# Patient Record
Sex: MALE | Race: WHITE | Employment: OTHER | URBAN - METROPOLITAN AREA
[De-identification: names, ages, dates, MRNs, and addresses within clinical notes are randomized per-mention and may not be internally consistent; named-entity substitution may affect disease eponyms.]

---

## 2019-07-10 PROBLEM — I25.10 CORONARY ARTERY DISEASE INVOLVING NATIVE CORONARY ARTERY OF NATIVE HEART WITHOUT ANGINA PECTORIS: Status: ACTIVE | Noted: 2019-07-10

## 2019-07-10 PROBLEM — E78.2 MIXED HYPERLIPIDEMIA: Status: ACTIVE | Noted: 2019-07-10

## 2019-07-10 PROBLEM — I10 ESSENTIAL HYPERTENSION: Status: ACTIVE | Noted: 2019-07-10

## 2019-07-10 NOTE — PROGRESS NOTES
OTHER   Kidney stone surgery    PERCUTANEOUS CORONARY INTERVENTION N/A 2016   PERCUTANEOUS CORONARY INTERVENTION performed by Maynor Neves MD at [facility] CATH LAB              Surgical History:   has no past surgical history on file. Social History:   reports that he has never smoked. He has never used smokeless tobacco.       SOCIAL HISTORY:     TOBACCO USE:  History   Smoking Status    Former Smoker    Packs/day: 0.50    Years: 20.00    Types: Cigarettes   Smokeless Tobacco    Never Used   Comment: Patient states he quit 30 years ago       FAMILY HISTORY:     Family History   Problem Relation Age of Onset    Cancer Mother      [facility] COPD Sister              Family History:  family history is not on file. Home Medications:  Were reviewed and are listed in nursing record and/or below  Prior to Admission medications    Medication Sig Start Date End Date Taking? Authorizing Provider   atorvastatin (LIPITOR) 80 MG tablet Take 80 mg by mouth daily   Yes Historical Provider, MD   metoprolol succinate (TOPROL XL) 25 MG extended release tablet Take 25 mg by mouth 2 times daily (with meals)   Yes Historical Provider, MD   clopidogrel (PLAVIX) 75 MG tablet Take 75 mg by mouth daily   Yes Historical Provider, MD   triamterene-hydrochlorothiazide (DYAZIDE) 37.5-25 MG per capsule Take 1 capsule by mouth every morning   Yes Historical Provider, MD   primidone (MYSOLINE) 50 MG tablet Take 50 mg by mouth 3 times daily   Yes Historical Provider, MD   levothyroxine (SYNTHROID) 50 MCG tablet Take 50 mcg by mouth Daily   Yes Historical Provider, MD   allopurinol (ZYLOPRIM) 300 MG tablet Take 300 mg by mouth daily   Yes Historical Provider, MD   aspirin 81 MG tablet Take 81 mg by mouth daily   Yes Historical Provider, MD          Allergies:  Patient has no known allergies. Review of Systems:   A 14 point review of symptoms completed.  Pertinent positives identified in the HPI, all other review of 50%.  3.  There is mixed fibrofatty plaque in the mid and distal portions of the right and left common carotid artery extending into the carotid bulbs. 4.  There is normal antegrade flow demonstrated in the right and left vertebral artery. Studies:       I have reviewed labs and imaging/xray/diagnostic testing in this note. Assessment        Patient Active Problem List   Diagnosis    Coronary artery disease involving native coronary artery of native heart without angina pectoris    Essential hypertension    Mixed hyperlipidemia         Class 3-4 angina        Plan   1. Cardiac cath for angina, go to ER for any recurrent cp in interim  2. Nitro under the tongue for chest pain  3. Discussed GI referral for possible GERD  4. Will obtain records from [facility]   5. Follow up after procedure    Get labs today, may need f/u echo pending review of records from [address]: The procedures, indications, risks and alternatives have been discussed with the patient and, as appropriate, with the patient's guardian . Risks discussed included, but are not limited to, bleeding, development of blood clots/emboli, damage to blood vessels, renal failure, malignant cardiac arrhythmias, stroke, heart attack, emergent coronary bypass surgery, death, dye allergy. The patient (and guardian as appropriate) expressed understanding of the aforementioned and wished to proceed. Scribe's attestation: This note was scribed in the presence of Dr. Minoo Grissom by Familia Adams RN      Thank you for allowing us to participate in the care of [address]. Please call me with any questions [phone]. Minoo Grissom MD, [facility]   Interventional Cardiologist  [facility]  [phone] [facility]  [phone] [address] [place] [address]  7/11/2019 9:40 AM    I will address the patient's cardiac risk factors and adjusted pharmacologic treatment as needed.  In addition, I

## 2019-07-11 ENCOUNTER — HOSPITAL ENCOUNTER (OUTPATIENT)
Age: 84
Discharge: HOME | End: 2019-07-11
Payer: MEDICARE

## 2019-07-11 ENCOUNTER — OFFICE VISIT (OUTPATIENT)
Dept: CARDIOLOGY CLINIC | Age: 84
End: 2019-07-11
Payer: MEDICARE

## 2019-07-11 ENCOUNTER — TELEPHONE (OUTPATIENT)
Dept: CARDIOLOGY CLINIC | Age: 84
End: 2019-07-11

## 2019-07-11 VITALS
BODY MASS INDEX: 31.31 KG/M2 | DIASTOLIC BLOOD PRESSURE: 62 MMHG | WEIGHT: 206.6 LBS | HEIGHT: 68 IN | OXYGEN SATURATION: 97 % | HEART RATE: 71 BPM | SYSTOLIC BLOOD PRESSURE: 122 MMHG

## 2019-07-11 DIAGNOSIS — I10 ESSENTIAL HYPERTENSION: ICD-10-CM

## 2019-07-11 DIAGNOSIS — E78.2 MIXED HYPERLIPIDEMIA: ICD-10-CM

## 2019-07-11 DIAGNOSIS — I20.0 UNSTABLE ANGINA (HCC): Primary | ICD-10-CM

## 2019-07-11 DIAGNOSIS — I25.10 CORONARY ARTERY DISEASE INVOLVING NATIVE CORONARY ARTERY OF NATIVE HEART WITHOUT ANGINA PECTORIS: ICD-10-CM

## 2019-07-11 LAB
ANION GAP SERPL CALCULATED.3IONS-SCNC: 13 MMOL/L (ref 3–16)
BASOPHILS ABSOLUTE: 0.1 K/UL (ref 0–0.2)
BASOPHILS RELATIVE PERCENT: 0.6 %
BUN BLDV-MCNC: 19 MG/DL (ref 7–20)
CALCIUM SERPL-MCNC: 9.3 MG/DL (ref 8.3–10.6)
CHLORIDE BLD-SCNC: 102 MMOL/L (ref 99–110)
CO2: 25 MMOL/L (ref 21–32)
CREAT SERPL-MCNC: 0.9 MG/DL (ref 0.8–1.3)
EOSINOPHILS ABSOLUTE: 1.7 K/UL (ref 0–0.6)
EOSINOPHILS RELATIVE PERCENT: 19.7 %
GFR AFRICAN AMERICAN: >60
GFR NON-AFRICAN AMERICAN: >60
GLUCOSE BLD-MCNC: 161 MG/DL (ref 70–99)
HCT VFR BLD CALC: 43.9 % (ref 40.5–52.5)
HEMOGLOBIN: 15.2 G/DL (ref 13.5–17.5)
LYMPHOCYTES ABSOLUTE: 1.2 K/UL (ref 1–5.1)
LYMPHOCYTES RELATIVE PERCENT: 13.7 %
MCH RBC QN AUTO: 32.3 PG (ref 26–34)
MCHC RBC AUTO-ENTMCNC: 34.7 G/DL (ref 31–36)
MCV RBC AUTO: 93.2 FL (ref 80–100)
MONOCYTES ABSOLUTE: 0.5 K/UL (ref 0–1.3)
MONOCYTES RELATIVE PERCENT: 5.3 %
NEUTROPHILS ABSOLUTE: 5.3 K/UL (ref 1.7–7.7)
NEUTROPHILS RELATIVE PERCENT: 60.7 %
PDW BLD-RTO: 14.3 % (ref 12.4–15.4)
PLATELET # BLD: 123 K/UL (ref 135–450)
PMV BLD AUTO: 10.1 FL (ref 5–10.5)
POTASSIUM SERPL-SCNC: 3.5 MMOL/L (ref 3.5–5.1)
RBC # BLD: 4.71 M/UL (ref 4.2–5.9)
SODIUM BLD-SCNC: 140 MMOL/L (ref 136–145)
WBC # BLD: 8.6 K/UL (ref 4–11)

## 2019-07-11 PROCEDURE — 80048 BASIC METABOLIC PNL TOTAL CA: CPT

## 2019-07-11 PROCEDURE — 1036F TOBACCO NON-USER: CPT | Performed by: INTERNAL MEDICINE

## 2019-07-11 PROCEDURE — 93000 ELECTROCARDIOGRAM COMPLETE: CPT | Performed by: INTERNAL MEDICINE

## 2019-07-11 PROCEDURE — 1123F ACP DISCUSS/DSCN MKR DOCD: CPT | Performed by: INTERNAL MEDICINE

## 2019-07-11 PROCEDURE — G8598 ASA/ANTIPLAT THER USED: HCPCS | Performed by: INTERNAL MEDICINE

## 2019-07-11 PROCEDURE — G8427 DOCREV CUR MEDS BY ELIG CLIN: HCPCS | Performed by: INTERNAL MEDICINE

## 2019-07-11 PROCEDURE — 4040F PNEUMOC VAC/ADMIN/RCVD: CPT | Performed by: INTERNAL MEDICINE

## 2019-07-11 PROCEDURE — 99215 OFFICE O/P EST HI 40 MIN: CPT | Performed by: INTERNAL MEDICINE

## 2019-07-11 PROCEDURE — G8417 CALC BMI ABV UP PARAM F/U: HCPCS | Performed by: INTERNAL MEDICINE

## 2019-07-11 PROCEDURE — 36415 COLL VENOUS BLD VENIPUNCTURE: CPT

## 2019-07-11 PROCEDURE — 85025 COMPLETE CBC W/AUTO DIFF WBC: CPT

## 2019-07-11 RX ORDER — ALLOPURINOL 300 MG/1
300 TABLET ORAL DAILY
COMMUNITY

## 2019-07-11 RX ORDER — ATORVASTATIN CALCIUM 80 MG/1
80 TABLET, FILM COATED ORAL DAILY
COMMUNITY
End: 2019-10-09 | Stop reason: SDUPTHER

## 2019-07-11 RX ORDER — LEVOTHYROXINE SODIUM 0.05 MG/1
50 TABLET ORAL DAILY
COMMUNITY

## 2019-07-11 RX ORDER — PRIMIDONE 50 MG/1
50 TABLET ORAL 3 TIMES DAILY
Status: ON HOLD | COMMUNITY
End: 2020-07-08

## 2019-07-11 RX ORDER — TRIAMTERENE AND HYDROCHLOROTHIAZIDE 37.5; 25 MG/1; MG/1
1 CAPSULE ORAL EVERY MORNING
Status: ON HOLD | COMMUNITY
End: 2020-07-19 | Stop reason: HOSPADM

## 2019-07-11 RX ORDER — METOPROLOL SUCCINATE 25 MG/1
25 TABLET, EXTENDED RELEASE ORAL 2 TIMES DAILY WITH MEALS
Status: ON HOLD | COMMUNITY
End: 2020-07-19 | Stop reason: HOSPADM

## 2019-07-11 RX ORDER — NITROGLYCERIN 0.4 MG/1
0.4 TABLET SUBLINGUAL EVERY 5 MIN PRN
Qty: 25 TABLET | Refills: 3 | Status: ON HOLD | OUTPATIENT
Start: 2019-07-11 | End: 2020-07-19 | Stop reason: HOSPADM

## 2019-07-11 RX ORDER — NITROGLYCERIN 0.4 MG/1
0.4 TABLET SUBLINGUAL EVERY 5 MIN PRN
Qty: 25 TABLET | Refills: 3 | Status: CANCELLED | OUTPATIENT
Start: 2019-07-11

## 2019-07-11 RX ORDER — CLOPIDOGREL BISULFATE 75 MG/1
75 TABLET ORAL DAILY
Status: ON HOLD | COMMUNITY
End: 2020-07-19 | Stop reason: HOSPADM

## 2019-07-11 NOTE — TELEPHONE ENCOUNTER
Patient seen in office today. Patient is scheduled with Dr. Will Rodriguez for Left Heart Cath on 7/16/19 at 12pm [facility], arrival time of 10:30am to the Cath Lab. Please have patient arrive to the main entrance of [facility] at 10:15am and check in with the registration desk. Medications reviewed in office with FELIX Ellis. Remind patient to be NPO after midnight (8 hours prior). Do not apply lotions/creams on skin the day of procedure.

## 2019-07-12 ENCOUNTER — TELEPHONE (OUTPATIENT)
Dept: CARDIOLOGY CLINIC | Age: 84
End: 2019-07-12

## 2019-07-16 ENCOUNTER — HOSPITAL ENCOUNTER (INPATIENT)
Dept: CARDIAC CATH/INVASIVE PROCEDURES | Age: 84
LOS: 1 days | Discharge: HOME | DRG: 287 | End: 2019-07-17
Attending: INTERNAL MEDICINE | Admitting: INTERNAL MEDICINE
Payer: MEDICARE

## 2019-07-16 DIAGNOSIS — I20.0 UNSTABLE ANGINA (HCC): ICD-10-CM

## 2019-07-16 DIAGNOSIS — I25.10 CORONARY ARTERY DISEASE INVOLVING NATIVE CORONARY ARTERY OF NATIVE HEART WITHOUT ANGINA PECTORIS: ICD-10-CM

## 2019-07-16 LAB
A/G RATIO: 1.2 (ref 1.1–2.2)
ALBUMIN SERPL-MCNC: 4.2 G/DL (ref 3.4–5)
ALP BLD-CCNC: 73 U/L (ref 40–129)
ALT SERPL-CCNC: 92 U/L (ref 10–40)
ANION GAP SERPL CALCULATED.3IONS-SCNC: 11 MMOL/L (ref 3–16)
AST SERPL-CCNC: 75 U/L (ref 15–37)
BILIRUB SERPL-MCNC: 0.9 MG/DL (ref 0–1)
BUN BLDV-MCNC: 17 MG/DL (ref 7–20)
CALCIUM SERPL-MCNC: 9.4 MG/DL (ref 8.3–10.6)
CHLORIDE BLD-SCNC: 101 MMOL/L (ref 99–110)
CHOLESTEROL, TOTAL: 90 MG/DL (ref 0–199)
CO2: 24 MMOL/L (ref 21–32)
CREAT SERPL-MCNC: 0.8 MG/DL (ref 0.8–1.3)
EKG ATRIAL RATE: 63 BPM
EKG DIAGNOSIS: NORMAL
EKG P AXIS: 57 DEGREES
EKG P-R INTERVAL: 214 MS
EKG Q-T INTERVAL: 416 MS
EKG QRS DURATION: 88 MS
EKG QTC CALCULATION (BAZETT): 425 MS
EKG R AXIS: -1 DEGREES
EKG T AXIS: 40 DEGREES
EKG VENTRICULAR RATE: 63 BPM
GFR AFRICAN AMERICAN: >60
GFR NON-AFRICAN AMERICAN: >60
GLOBULIN: 3.6 G/DL
GLUCOSE BLD-MCNC: 125 MG/DL (ref 70–99)
GLUCOSE BLD-MCNC: 165 MG/DL (ref 70–99)
HCT VFR BLD CALC: 44.4 % (ref 40.5–52.5)
HDLC SERPL-MCNC: 35 MG/DL (ref 40–60)
HEMOGLOBIN: 15.3 G/DL (ref 13.5–17.5)
INR BLD: 1.13 (ref 0.86–1.14)
LDL CHOLESTEROL CALCULATED: 30 MG/DL
LEFT VENTRICULAR EJECTION FRACTION HIGH VALUE: 55 %
LEFT VENTRICULAR EJECTION FRACTION MODE: NORMAL
LV EF: 50 %
LV EF: 50 %
LVEF MODALITY: NORMAL
MCH RBC QN AUTO: 31.5 PG (ref 26–34)
MCHC RBC AUTO-ENTMCNC: 34.5 G/DL (ref 31–36)
MCV RBC AUTO: 91.3 FL (ref 80–100)
PDW BLD-RTO: 14.6 % (ref 12.4–15.4)
PERFORMED ON: ABNORMAL
PLATELET # BLD: 127 K/UL (ref 135–450)
PMV BLD AUTO: 9.3 FL (ref 5–10.5)
POC ACT LR: 268 SEC
POTASSIUM SERPL-SCNC: 3.5 MMOL/L (ref 3.5–5.1)
PROTHROMBIN TIME: 12.9 SEC (ref 9.8–13)
RBC # BLD: 4.87 M/UL (ref 4.2–5.9)
SODIUM BLD-SCNC: 136 MMOL/L (ref 136–145)
TOTAL PROTEIN: 7.8 G/DL (ref 6.4–8.2)
TRIGL SERPL-MCNC: 123 MG/DL (ref 0–150)
VLDLC SERPL CALC-MCNC: 25 MG/DL
WBC # BLD: 9.7 K/UL (ref 4–11)

## 2019-07-16 PROCEDURE — 85027 COMPLETE CBC AUTOMATED: CPT

## 2019-07-16 PROCEDURE — 85347 COAGULATION TIME ACTIVATED: CPT

## 2019-07-16 PROCEDURE — C1769 GUIDE WIRE: HCPCS

## 2019-07-16 PROCEDURE — 6370000000 HC RX 637 (ALT 250 FOR IP): Performed by: INTERNAL MEDICINE

## 2019-07-16 PROCEDURE — 2580000003 HC RX 258

## 2019-07-16 PROCEDURE — 80061 LIPID PANEL: CPT

## 2019-07-16 PROCEDURE — 6360000004 HC RX CONTRAST MEDICATION

## 2019-07-16 PROCEDURE — C1887 CATHETER, GUIDING: HCPCS

## 2019-07-16 PROCEDURE — 6370000000 HC RX 637 (ALT 250 FOR IP)

## 2019-07-16 PROCEDURE — 80053 COMPREHEN METABOLIC PANEL: CPT

## 2019-07-16 PROCEDURE — 4A023N7 MEASUREMENT OF CARDIAC SAMPLING AND PRESSURE, LEFT HEART, PERCUTANEOUS APPROACH: ICD-10-PCS | Performed by: INTERNAL MEDICINE

## 2019-07-16 PROCEDURE — 93459 L HRT ART/GRFT ANGIO: CPT

## 2019-07-16 PROCEDURE — 99153 MOD SED SAME PHYS/QHP EA: CPT

## 2019-07-16 PROCEDURE — B2151ZZ FLUOROSCOPY OF LEFT HEART USING LOW OSMOLAR CONTRAST: ICD-10-PCS | Performed by: INTERNAL MEDICINE

## 2019-07-16 PROCEDURE — 6360000002 HC RX W HCPCS

## 2019-07-16 PROCEDURE — 2709999900 HC NON-CHARGEABLE SUPPLY

## 2019-07-16 PROCEDURE — 93005 ELECTROCARDIOGRAM TRACING: CPT | Performed by: INTERNAL MEDICINE

## 2019-07-16 PROCEDURE — 2580000003 HC RX 258: Performed by: INTERNAL MEDICINE

## 2019-07-16 PROCEDURE — B2131ZZ FLUOROSCOPY OF MULTIPLE CORONARY ARTERY BYPASS GRAFTS USING LOW OSMOLAR CONTRAST: ICD-10-PCS | Performed by: INTERNAL MEDICINE

## 2019-07-16 PROCEDURE — 85610 PROTHROMBIN TIME: CPT

## 2019-07-16 PROCEDURE — 99152 MOD SED SAME PHYS/QHP 5/>YRS: CPT | Performed by: INTERNAL MEDICINE

## 2019-07-16 PROCEDURE — 93010 ELECTROCARDIOGRAM REPORT: CPT | Performed by: INTERNAL MEDICINE

## 2019-07-16 PROCEDURE — 2500000003 HC RX 250 WO HCPCS

## 2019-07-16 PROCEDURE — 2060000000 HC ICU INTERMEDIATE R&B

## 2019-07-16 PROCEDURE — C1894 INTRO/SHEATH, NON-LASER: HCPCS

## 2019-07-16 PROCEDURE — 99152 MOD SED SAME PHYS/QHP 5/>YRS: CPT

## 2019-07-16 PROCEDURE — 93458 L HRT ARTERY/VENTRICLE ANGIO: CPT | Performed by: INTERNAL MEDICINE

## 2019-07-16 RX ORDER — ACETAMINOPHEN 325 MG/1
650 TABLET ORAL EVERY 4 HOURS PRN
Status: DISCONTINUED | OUTPATIENT
Start: 2019-07-16 | End: 2019-07-17 | Stop reason: HOSPADM

## 2019-07-16 RX ORDER — SODIUM CHLORIDE 0.9 % (FLUSH) 0.9 %
10 SYRINGE (ML) INJECTION EVERY 12 HOURS SCHEDULED
Status: DISCONTINUED | OUTPATIENT
Start: 2019-07-16 | End: 2019-07-17 | Stop reason: HOSPADM

## 2019-07-16 RX ORDER — SODIUM CHLORIDE 0.9 % (FLUSH) 0.9 %
10 SYRINGE (ML) INJECTION PRN
Status: DISCONTINUED | OUTPATIENT
Start: 2019-07-16 | End: 2019-07-17 | Stop reason: HOSPADM

## 2019-07-16 RX ORDER — ONDANSETRON 2 MG/ML
4 INJECTION INTRAMUSCULAR; INTRAVENOUS EVERY 6 HOURS PRN
Status: DISCONTINUED | OUTPATIENT
Start: 2019-07-16 | End: 2019-07-17 | Stop reason: HOSPADM

## 2019-07-16 RX ORDER — NITROGLYCERIN 0.4 MG/1
0.4 TABLET SUBLINGUAL EVERY 5 MIN PRN
Status: DISCONTINUED | OUTPATIENT
Start: 2019-07-16 | End: 2019-07-17 | Stop reason: HOSPADM

## 2019-07-16 RX ORDER — TRIAMTERENE AND HYDROCHLOROTHIAZIDE 37.5; 25 MG/1; MG/1
1 TABLET ORAL EVERY MORNING
Status: DISCONTINUED | OUTPATIENT
Start: 2019-07-16 | End: 2019-07-17 | Stop reason: HOSPADM

## 2019-07-16 RX ORDER — ALLOPURINOL 300 MG/1
300 TABLET ORAL DAILY
Status: DISCONTINUED | OUTPATIENT
Start: 2019-07-16 | End: 2019-07-17 | Stop reason: HOSPADM

## 2019-07-16 RX ORDER — SODIUM CHLORIDE 9 MG/ML
INJECTION, SOLUTION INTRAVENOUS ONCE
Status: COMPLETED | OUTPATIENT
Start: 2019-07-16 | End: 2019-07-16

## 2019-07-16 RX ORDER — ATORVASTATIN CALCIUM 80 MG/1
80 TABLET, FILM COATED ORAL DAILY
Status: DISCONTINUED | OUTPATIENT
Start: 2019-07-16 | End: 2019-07-17 | Stop reason: HOSPADM

## 2019-07-16 RX ORDER — MIDAZOLAM HYDROCHLORIDE 5 MG/ML
INJECTION INTRAMUSCULAR; INTRAVENOUS
Status: COMPLETED | OUTPATIENT
Start: 2019-07-16 | End: 2019-07-16

## 2019-07-16 RX ORDER — HEPARIN SODIUM 1000 [USP'U]/ML
INJECTION, SOLUTION INTRAVENOUS; SUBCUTANEOUS
Status: COMPLETED | OUTPATIENT
Start: 2019-07-16 | End: 2019-07-16

## 2019-07-16 RX ORDER — FENTANYL CITRATE 50 UG/ML
INJECTION, SOLUTION INTRAMUSCULAR; INTRAVENOUS
Status: COMPLETED | OUTPATIENT
Start: 2019-07-16 | End: 2019-07-16

## 2019-07-16 RX ORDER — ASPIRIN 81 MG/1
81 TABLET, CHEWABLE ORAL ONCE
Status: COMPLETED | OUTPATIENT
Start: 2019-07-16 | End: 2019-07-16

## 2019-07-16 RX ORDER — PRIMIDONE 50 MG/1
50 TABLET ORAL 3 TIMES DAILY
Status: DISCONTINUED | OUTPATIENT
Start: 2019-07-16 | End: 2019-07-17 | Stop reason: HOSPADM

## 2019-07-16 RX ORDER — METOPROLOL SUCCINATE 25 MG/1
25 TABLET, EXTENDED RELEASE ORAL 2 TIMES DAILY WITH MEALS
Status: DISCONTINUED | OUTPATIENT
Start: 2019-07-16 | End: 2019-07-17 | Stop reason: HOSPADM

## 2019-07-16 RX ORDER — LEVOTHYROXINE SODIUM 0.05 MG/1
50 TABLET ORAL DAILY
Status: DISCONTINUED | OUTPATIENT
Start: 2019-07-16 | End: 2019-07-17 | Stop reason: HOSPADM

## 2019-07-16 RX ORDER — CLOPIDOGREL BISULFATE 75 MG/1
75 TABLET ORAL DAILY
Status: DISCONTINUED | OUTPATIENT
Start: 2019-07-16 | End: 2019-07-17 | Stop reason: HOSPADM

## 2019-07-16 RX ADMIN — SODIUM CHLORIDE: 9 INJECTION, SOLUTION INTRAVENOUS at 09:58

## 2019-07-16 RX ADMIN — MIDAZOLAM HYDROCHLORIDE 2 MG: 5 INJECTION INTRAMUSCULAR; INTRAVENOUS at 12:21

## 2019-07-16 RX ADMIN — PRIMIDONE 50 MG: 50 TABLET ORAL at 20:17

## 2019-07-16 RX ADMIN — ASPIRIN 81 MG: 81 TABLET, CHEWABLE ORAL at 09:58

## 2019-07-16 RX ADMIN — Medication 10 ML: at 20:18

## 2019-07-16 RX ADMIN — METOPROLOL SUCCINATE 25 MG: 25 TABLET, EXTENDED RELEASE ORAL at 20:17

## 2019-07-16 RX ADMIN — ATORVASTATIN CALCIUM 80 MG: 80 TABLET, FILM COATED ORAL at 20:17

## 2019-07-16 RX ADMIN — ACETAMINOPHEN 650 MG: 325 TABLET ORAL at 20:17

## 2019-07-16 RX ADMIN — ACETAMINOPHEN 650 MG: 325 TABLET ORAL at 13:26

## 2019-07-16 RX ADMIN — FENTANYL CITRATE 50 MCG: 50 INJECTION, SOLUTION INTRAMUSCULAR; INTRAVENOUS at 12:21

## 2019-07-16 RX ADMIN — CLOPIDOGREL BISULFATE 75 MG: 75 TABLET ORAL at 09:58

## 2019-07-16 RX ADMIN — HEPARIN SODIUM 5000 UNITS: 1000 INJECTION, SOLUTION INTRAVENOUS; SUBCUTANEOUS at 12:21

## 2019-07-16 ASSESSMENT — PAIN SCALES - GENERAL
PAINLEVEL_OUTOF10: 0
PAINLEVEL_OUTOF10: 4
PAINLEVEL_OUTOF10: 4
PAINLEVEL_OUTOF10: 6

## 2019-07-16 ASSESSMENT — PAIN DESCRIPTION - DESCRIPTORS: DESCRIPTORS: HEADACHE

## 2019-07-16 ASSESSMENT — PAIN DESCRIPTION - PAIN TYPE: TYPE: ACUTE PAIN

## 2019-07-16 ASSESSMENT — PAIN DESCRIPTION - LOCATION: LOCATION: HEAD

## 2019-07-16 NOTE — CONSULTS
homes    Family History:    No family history on file. REVIEW OF SYSTEMS:      Constitutional:  No night sweats, headaches, weight loss. Eyes:  No glaucoma, cataracts. ENMT:  No nosebleeds, deviated septum. Cardiac:  No arrhythmias, +chest pain. Vascular:  No claudication, varicosities. GI:  No PUD, heartburn. :  No kidney stones, frequent UTIs  Musculoskeletal:  No arthritis, gout. Respiratory: +SOB, No emphysema, asthma. Integumentary:  No dermatitis, itching, rash. Neurological:  No stroke, TIAs, seizures. Psychiatric:  No depression, anxiety. Endocrine: No diabetes, thyroid issues. Hematologic:  No bleeding, easy bruising. Immunologic:  No known cancer, steroid therapies. PHYSICAL EXAM:  VITALS:  Ht 5' 8\" (1.727 m)   Wt 206 lb (93.4 kg)   BMI 31.32 kg/m²     Constitutional:   Well developed and nourished male. No acute distress. Obesity class 1. Eyes:  lids and lashes normal, pupils equal, round and reactive to light, extra ocular muscles intact, sclera clear, conjunctiva normal    Head/ENT:  Edentulous, normal gums, & palate. Moist mucus membranes. No cyanosis or pallor. Neck:  supple, symmetrical, trachea midline, no lymphadenopathy, no jugular venous distension, no carotid bruits and MASSES:  no masses. No thyromegaly. Lungs:  no increased work of breathing, good air exchange, no retractions and clear to auscultation. No tactile fremitus. Cardiovascular:  regular rate and rhythm, S1, S2 normal, no murmur, click, rub or gallop and RRR 2/6 harsh murmur. Apical impulse in 5th intercostal space. Pulses:  Right dorsalis pedis 2, Left dorsalis pedis 2, Right posterior tibial 2, Left Posterior tibial 2, Right radial TR Band, and Left radial 2. Abdomen:  normal bowel sounds, non-tender, aorta normal and bruits absent. No hepatosplenomegaly or masses. Musculoskeletal:  Back is straight and non-tender, full ROM of upper and lower extremities.   No kyphosis or 0.25%  Renal Failure:  1.60%  Reoperation:  5.14%    79yo male with significant left main disease. Continue risk stratification. Await Plavix metabolism. Surgery likely next week. Carotid duplex. Vein mapping. PFTs.     Sebas Stevens, PhD, [address]   7/16/2019  12:57 PM  Patient seen and examined chart was reviewed  Cath was reviewed with cardiology over the phone  Plan for coronary artery bypass x3 LIMA to LAD reverse saphenous vein to OM reverse evidence vein to PDA or right diffusely calcified blood vessels patient on Plavix surgery will be await Plavix metabolism early next week

## 2019-07-16 NOTE — PROGRESS NOTES
Brief Pre-Op Note/Sedation Assessment      Jackie Oh  9/4/1933  Cath Pool Rm/NONE  [id number]  11:45 AM    Planned Procedure: Cardiac Catheterization Procedure    Post Procedure Plan: Return to same level of care    Consent: I have discussed with the patient and/or the patient representative the indication, alternatives, and the possible risks and/or complications of the planned procedure and the anesthesia methods. The patient and/or patient representative appear to understand and agree to proceed. DISCUSSION OF CARDIAC CATHETERIZATION PROCEDURES: The procedures, indications, risks and alternatives have been discussed with the patient and, as appropriate, with the patient's guardian . Risks discussed included, but are not limited to, bleeding, development of blood clots/emboli, damage to blood vessels, renal failure, malignant cardiac arrhythmias, stroke, heart attack, emergent coronary bypass surgery, death, dye allergy. The patient (and guardian as appropriate) expressed understanding of the aforementioned and wished to proceed. Chief Complaint: Chest Pain/Pressure      Indications for the Procedure:   CAD Presentation:  Worsening Angina  Anginal Classification within 2 weeks:  CCS IV - Inability to perform any activity without angina or angina at rest, i.e., severe limitation  NYHA Heart Failure Class within 2 weeks: No symptoms      Anti- Anginal Meds within 2 weeks:   ANTI-ANGINAL MEDS: Yes: Beta Blockers      Stress or Imaging Studies Performed:  None    Vital Signs:  Ht 5' 8\" (1.727 m)   Wt 206 lb (93.4 kg)   BMI 31.32 kg/m²     Allergies:  No Known Allergies    Past Medical History:  No past medical history on file. Surgical History:  No past surgical history on file.       Medications:  Current Facility-Administered Medications   Medication Dose Route Frequency Provider Last Rate Last Dose    clopidogrel (PLAVIX) tablet 75 mg  75 mg Oral Daily Fred Winslow MD   75 mg at 07/16/19 negative psychological responses to treatment by providing sedation and analgesia and maximize the potential amnesia. Patient to meet pre-procedure discharge plan.     Medication Planned:  midazolam intravenously, fentanyl intravenously    Patient is an appropriate candidate for plan of sedation: yes      Electronically signed by Greg Schaffer MD on 7/16/2019 at 11:45 AM

## 2019-07-16 NOTE — PROCEDURES
CARDIAC CATHETERIZATION REPORT     Procedure Date:  2019  Patient Name: Carla Homans  MRN: [id number] : 1933      INDICATION     Class 4 angina    PROCEDURES PERFORMED     Left heart catheterization  LVgram  Coronary angiogam  Coronary cath  LIMA angiogram          PROCEDURE DESCRIPTION   Risks/benefits/alternatives/outcomes were discussed with patient and/or family and informed consent was obtained. Using the [facility] test, the patient's right radial artery was found to be acceptable for cannulation. Patient was prepped draped in the usual sterile fashion. Local anaesthetic was applied over puncture site. Using a back wall technique, a 6 [place] Terumo sheath was inserted into right radial artery. Verapamil, nitroglycerin, nicardipine were administered through the sheath. Heparin was administered. There was radial/brachial/brachiocephalic tortuosity that was navigated with wholey wire. There was radial spasm that responded to vasodilators. Diagnostic 5fr pigtail, TIG catheters were used for diagnostic angiograms. At the conclusion of the procedure, a TR band was placed over the puncture site and hemostasis was obtained. There were no immediate complications. I supervised sedation with versed 2mg/fentanyl 50mcg during the procedure. 75cc contrast was utilized. <20cc EBL. FINDINGS           LVGRAM    LVEDP 7   GRADIENT ACROSS AORTIC VALVE None   LV FUNCTION EF 50%   WALL MOTION Normal   MITRAL REGURGITATION Mild         CORONARY ARTERIES    LM Calcified. Distal 20-30% stenosis. LAD Severely calcified. Ostial 85-90% stenosis. Prox 75% stenosis. Prox-mid vessel is stented with Mid 50% stenosis. Distal vessel has 60-70% stenosis. D1 and D2 have ostial 90% stenoses. LCX Calcified. Prox-mid 40% stenosis. Mid-distal 50-60% stenosis extends into ostium of OM1 and OM2           RCA Calcified. Ostial 40-50% stenosis. Ngxp-bjo-opewct 30% stenoses.   PDA and PLV ostium

## 2019-07-16 NOTE — PROGRESS NOTES
4 Eyes Skin Assessment     The patient is being assess for   Admission    I agree that 2 RN's have performed a thorough Head to Toe Skin Assessment on the patient. ALL assessment sites listed below have been assessed. Areas assessed by both nurses:   [x]   Head, Face, and Ears   [x]   Shoulders, Back, and Chest, Abdomen  [x]   Arms, Elbows, and Hands   [x]   Coccyx, Sacrum, and Ischium  [x]   Legs, Feet, and Heels            **SHARE this note so that the co-signing nurse is able to place an eSignature**    Co-signer eSignature: Electronically signed by Joshua Blanca RN on 7/17/19 at 6:04 AM    Does the Patient have Skin Breakdown?   No          Brady Prevention initiated:  No   Wound Care Orders initiated:  No      [facility] nurse consulted for Pressure Injury (Stage 3,4, Unstageable, DTI, NWPT, Complex wounds)and New or Established Ostomies:  No      Primary Nurse eSignature: Electronically signed by Klever Beard RN on 7/16/19 at 6:58 PM

## 2019-07-17 ENCOUNTER — APPOINTMENT (OUTPATIENT)
Dept: CT IMAGING | Age: 84
DRG: 287 | End: 2019-07-17
Attending: INTERNAL MEDICINE
Payer: MEDICARE

## 2019-07-17 ENCOUNTER — TELEPHONE (OUTPATIENT)
Dept: CARDIOLOGY | Age: 84
End: 2019-07-17

## 2019-07-17 VITALS
BODY MASS INDEX: 30.72 KG/M2 | OXYGEN SATURATION: 94 % | SYSTOLIC BLOOD PRESSURE: 104 MMHG | HEIGHT: 68 IN | DIASTOLIC BLOOD PRESSURE: 65 MMHG | WEIGHT: 202.7 LBS | HEART RATE: 87 BPM | RESPIRATION RATE: 18 BRPM | TEMPERATURE: 98 F

## 2019-07-17 PROBLEM — E66.9 OBESITY: Status: ACTIVE | Noted: 2019-07-17

## 2019-07-17 LAB
ANION GAP SERPL CALCULATED.3IONS-SCNC: 9 MMOL/L (ref 3–16)
BUN BLDV-MCNC: 16 MG/DL (ref 7–20)
CALCIUM SERPL-MCNC: 9.1 MG/DL (ref 8.3–10.6)
CHLORIDE BLD-SCNC: 102 MMOL/L (ref 99–110)
CO2: 27 MMOL/L (ref 21–32)
CREAT SERPL-MCNC: 0.8 MG/DL (ref 0.8–1.3)
GFR AFRICAN AMERICAN: >60
GFR NON-AFRICAN AMERICAN: >60
GLUCOSE BLD-MCNC: 154 MG/DL (ref 70–99)
HCT VFR BLD CALC: 42 % (ref 40.5–52.5)
HEMOGLOBIN: 14.6 G/DL (ref 13.5–17.5)
LV EF: 58 %
LVEF MODALITY: NORMAL
MAGNESIUM: 1.9 MG/DL (ref 1.8–2.4)
MCH RBC QN AUTO: 31.6 PG (ref 26–34)
MCHC RBC AUTO-ENTMCNC: 34.7 G/DL (ref 31–36)
MCV RBC AUTO: 91 FL (ref 80–100)
PDW BLD-RTO: 14.2 % (ref 12.4–15.4)
PHOSPHORUS: 3.2 MG/DL (ref 2.5–4.9)
PLATELET # BLD: 111 K/UL (ref 135–450)
PMV BLD AUTO: 9 FL (ref 5–10.5)
POTASSIUM SERPL-SCNC: 3.5 MMOL/L (ref 3.5–5.1)
RBC # BLD: 4.61 M/UL (ref 4.2–5.9)
SODIUM BLD-SCNC: 138 MMOL/L (ref 136–145)
WBC # BLD: 8 K/UL (ref 4–11)

## 2019-07-17 PROCEDURE — 85027 COMPLETE CBC AUTOMATED: CPT

## 2019-07-17 PROCEDURE — 84100 ASSAY OF PHOSPHORUS: CPT

## 2019-07-17 PROCEDURE — 75635 CT ANGIO ABDOMINAL ARTERIES: CPT

## 2019-07-17 PROCEDURE — 99233 SBSQ HOSP IP/OBS HIGH 50: CPT | Performed by: INTERNAL MEDICINE

## 2019-07-17 PROCEDURE — 83735 ASSAY OF MAGNESIUM: CPT

## 2019-07-17 PROCEDURE — 36415 COLL VENOUS BLD VENIPUNCTURE: CPT

## 2019-07-17 PROCEDURE — C8929 TTE W OR WO FOL WCON,DOPPLER: HCPCS

## 2019-07-17 PROCEDURE — 6370000000 HC RX 637 (ALT 250 FOR IP): Performed by: INTERNAL MEDICINE

## 2019-07-17 PROCEDURE — 2580000003 HC RX 258: Performed by: INTERNAL MEDICINE

## 2019-07-17 PROCEDURE — 80048 BASIC METABOLIC PNL TOTAL CA: CPT

## 2019-07-17 PROCEDURE — 6360000004 HC RX CONTRAST MEDICATION: Performed by: INTERNAL MEDICINE

## 2019-07-17 RX ORDER — ISOSORBIDE MONONITRATE 30 MG/1
15 TABLET, EXTENDED RELEASE ORAL DAILY
Qty: 15 TABLET | Refills: 0 | Status: SHIPPED | OUTPATIENT
Start: 2019-07-18 | End: 2019-08-09 | Stop reason: SINTOL

## 2019-07-17 RX ORDER — ISOSORBIDE MONONITRATE 30 MG/1
15 TABLET, EXTENDED RELEASE ORAL DAILY
Status: DISCONTINUED | OUTPATIENT
Start: 2019-07-17 | End: 2019-07-17 | Stop reason: HOSPADM

## 2019-07-17 RX ADMIN — PRIMIDONE 50 MG: 50 TABLET ORAL at 14:37

## 2019-07-17 RX ADMIN — CLOPIDOGREL BISULFATE 75 MG: 75 TABLET ORAL at 08:41

## 2019-07-17 RX ADMIN — ALLOPURINOL 300 MG: 300 TABLET ORAL at 08:41

## 2019-07-17 RX ADMIN — METOPROLOL SUCCINATE 25 MG: 25 TABLET, EXTENDED RELEASE ORAL at 08:40

## 2019-07-17 RX ADMIN — LEVOTHYROXINE SODIUM 50 MCG: 50 TABLET ORAL at 05:25

## 2019-07-17 RX ADMIN — Medication 10 ML: at 08:46

## 2019-07-17 RX ADMIN — ATORVASTATIN CALCIUM 80 MG: 80 TABLET, FILM COATED ORAL at 08:41

## 2019-07-17 RX ADMIN — IOPAMIDOL 100 ML: 755 INJECTION, SOLUTION INTRAVENOUS at 10:26

## 2019-07-17 RX ADMIN — PRIMIDONE 50 MG: 50 TABLET ORAL at 08:42

## 2019-07-17 RX ADMIN — ISOSORBIDE MONONITRATE 15 MG: 30 TABLET, EXTENDED RELEASE ORAL at 08:43

## 2019-07-17 RX ADMIN — TRIAMTERENE AND HYDROCHLOROTHIAZIDE 1 TABLET: 37.5; 25 TABLET ORAL at 08:42

## 2019-07-17 ASSESSMENT — PAIN SCALES - GENERAL
PAINLEVEL_OUTOF10: 0

## 2019-07-17 NOTE — PROGRESS NOTES
[facility]   Daily Cardiovascular Progress Note    Admit Date: 7/16/2019    Chief complaint: angina  HPI:     Pt presented for elective cath on 7/16/19, found to have severe LAD disease. Referred for cabg. O/n pt had no issues.         Medications/Labs all Reviewed:  Patient Active Problem List   Diagnosis    Coronary artery disease involving native coronary artery of native heart without angina pectoris    Essential hypertension    Mixed hyperlipidemia    Obesity       Medications:    isosorbide mononitrate (IMDUR) extended release tablet 15 mg Daily   clopidogrel (PLAVIX) tablet 75 mg Daily   sodium chloride flush 0.9 % injection 10 mL 2 times per day   sodium chloride flush 0.9 % injection 10 mL PRN   acetaminophen (TYLENOL) tablet 650 mg Q4H PRN   magnesium hydroxide (MILK OF MAGNESIA) 400 MG/5ML suspension 30 mL Daily PRN   allopurinol (ZYLOPRIM) tablet 300 mg Daily   atorvastatin (LIPITOR) tablet 80 mg Daily   levothyroxine (SYNTHROID) tablet 50 mcg Daily   metoprolol succinate (TOPROL XL) extended release tablet 25 mg BID WC   nitroGLYCERIN (NITROSTAT) SL tablet 0.4 mg Q5 Min PRN   primidone (MYSOLINE) tablet 50 mg TID   triamterene-hydrochlorothiazide (MAXZIDE-25) 37.5-25 MG per tablet 1 tablet QAM   sodium chloride flush 0.9 % injection 10 mL 2 times per day   sodium chloride flush 0.9 % injection 10 mL PRN   magnesium hydroxide (MILK OF MAGNESIA) 400 MG/5ML suspension 30 mL Daily PRN   ondansetron (ZOFRAN) injection 4 mg Q6H PRN   acetaminophen (TYLENOL) tablet 650 mg Q4H PRN   perflutren lipid microspheres (DEFINITY) injection 1.65 mg ONCE PRN          PHYSICAL EXAM   /64   Pulse 60   Temp 97.8 °F (36.6 °C) (Oral)   Resp 17   Ht 5' 8\" (1.727 m)   Wt 202 lb 11.2 oz (91.9 kg)   SpO2 96%   BMI 30.82 kg/m²    Vitals:    07/16/19 2002 07/16/19 2329 07/17/19 0522 07/17/19 0523   BP: 122/66 110/60 110/64    Pulse: 76 67 60    Resp: 18 17 17    Temp: 97.9 °F (36.6 °C) 97.8 °F (36.6 °C) 97.8 °F (36.6 °C)    TempSrc: Oral Oral Oral    SpO2: 95% 97% 96%    Weight:    202 lb 11.2 oz (91.9 kg)   Height:             Intake/Output Summary (Last 24 hours) at 2019 0751  Last data filed at 2019 0523  Gross per 24 hour   Intake 570 ml   Output 550 ml   Net 20 ml     Wt Readings from Last 3 Encounters:   19 202 lb 11.2 oz (91.9 kg)   19 206 lb 9.6 oz (93.7 kg)         Gen: Patient in NAD, resting comfortably  Neck: No JVD or bruits  Respiratory: CTAB no WRR  Chest: normal without deformity  Cardiovascular:RRR, S1S2, no mrg, normal PMI  Abdomen: Soft, NTND, Normal BS  Extremities: No clubbing, cyanosis, or edema  Neurological/Psychiatric: AxO x4, No gross motors/sensory deficits  Skin:  Warm and dry  Rt radial: no hematoma, intact pulse      Labs:  CBC: Recent Labs     19  1020 19  0511   WBC 9.7 8.0   HGB 15.3 14.6   HCT 44.4 42.0   MCV 91.3 91.0   * 111*     BMP: Recent Labs     19  1020 19  0511    138   K 3.5 3.5    102   CO2 24 27   PHOS  --  3.2   BUN 17 16   CREATININE 0.8 0.8     MG:    Recent Labs     19  0511   MG 1.90      PT/INR:   Recent Labs     19  1020   PROTIME 12.9   INR 1.13     APTT: No results for input(s): APTT in the last 72 hours. Cardiac Enzymes: No results for input(s): CKTOTAL, CKMB, CKMBINDEX, TROPONINI in the last 72 hours.     Cardiac Studies:    Cath:      CARDIAC CATHETERIZATION REPORT     Procedure Date:  2019  Patient Name: Pauline Barlow  MRN: [id number] : 1933      INDICATION     Class 4 angina    PROCEDURES PERFORMED     Left heart catheterization  LVgram  Coronary angiogam  Coronary cath  LIMA angiogram          PROCEDURE DESCRIPTION   Risks/benefits/alternatives/outcomes were discussed with patient   and/or family and informed consent was obtained.  Using the sandi   test, the patient's right radial artery was found to be   acceptable for cannulation.  Patient was prepped draped

## 2019-07-17 NOTE — PLAN OF CARE
Problem: Pain:  Goal: Patient's pain/discomfort is manageable  Description  Patient's pain/discomfort is manageable  Outcome: Ongoing  Note:   No c/o pain this shift. Will continue to monitor. Problem: Falls - Risk of:  Goal: Will remain free from falls  Description  Will remain free from falls  Outcome: Ongoing  Note:   Pt is free of falls at this time. Bed is in the lowest position, wheels locked, side rails up x 2, call light, and personal belongings within reach. Will continue to monitor.

## 2019-07-17 NOTE — TELEPHONE ENCOUNTER
Patient would like to pursue high risk PCI of LAD and possibly LM/LCx with impella and rotablator. Lets schedule that for 9am 7/25/19, will need reps for impella and rotablator. Lets call patient to schedule. Thank you.

## 2019-07-17 NOTE — PROGRESS NOTES
BMI 30.0 - 34.9 Obese Class I    Nutrition Interventions:   Continue current diet  Continued Inpatient Monitoring    Nutrition Evaluation:   · Evaluation: Goals set   · Goals: Patient will consume 50%< of PO meals provided     · Monitoring: Nutrition Progression, Meal Intake, Weight, Pertinent Labs, Monitor Bowel Function, I&O      Electronically signed by Osmel Cash RD, LD on 7/17/19 at 1:50 PM  Contact Number: Office: [phone]; [address] [place] [address] Road: [phone]

## 2019-07-17 NOTE — PROGRESS NOTES
Post Cath Lab follow up completed by Cath Lab staff. Access site right Radial Artery site assessed and WNL. Post Cath restrictions reviewed, no further questions.  Anticipate d/c today

## 2019-07-17 NOTE — H&P
the opportunity to be involved in this patient's care. If you have any questions or concerns please feel free to contact me at [phone].

## 2019-07-18 NOTE — TELEPHONE ENCOUNTER
Spoke with patient's son. Procedure confirmed.  Reminded him instructions were on discharge papers from hospital.

## 2019-07-18 NOTE — DISCHARGE SUMMARY
tablet, R-3Normal             Time Spent on discharge is more than 30 minutes in the examination, evaluation, counseling and review of medications and discharge plan. Signed:    Kristi Travis MD   7/18/2019      Thank you Jenna Dodge MD for the opportunity to be involved in this patient's care. If you have any questions or concerns please feel free to contact me at [phone].

## 2019-07-22 ENCOUNTER — TELEPHONE (OUTPATIENT)
Dept: CARDIOLOGY | Age: 84
End: 2019-07-22

## 2019-07-23 NOTE — TELEPHONE ENCOUNTER
Left message for patient to call back. Also informed Krishna Bettencourt. She will need to try to set up anaesthesia as well.

## 2019-07-24 ENCOUNTER — ANESTHESIA EVENT (OUTPATIENT)
Dept: CARDIAC CATH/INVASIVE PROCEDURES | Age: 84
End: 2019-07-24
Payer: MEDICARE

## 2019-07-25 ENCOUNTER — HOSPITAL ENCOUNTER (OUTPATIENT)
Dept: CARDIAC CATH/INVASIVE PROCEDURES | Age: 84
Discharge: HOME | End: 2019-07-26
Attending: INTERNAL MEDICINE | Admitting: INTERNAL MEDICINE
Payer: MEDICARE

## 2019-07-25 ENCOUNTER — ANESTHESIA (OUTPATIENT)
Dept: CARDIAC CATH/INVASIVE PROCEDURES | Age: 84
End: 2019-07-25
Payer: MEDICARE

## 2019-07-25 VITALS — TEMPERATURE: 96.3 F | DIASTOLIC BLOOD PRESSURE: 84 MMHG | SYSTOLIC BLOOD PRESSURE: 157 MMHG | OXYGEN SATURATION: 98 %

## 2019-07-25 PROBLEM — I20.9 ANGINA, CLASS III (HCC): Status: ACTIVE | Noted: 2019-07-25

## 2019-07-25 LAB
ANION GAP SERPL CALCULATED.3IONS-SCNC: 10 MMOL/L (ref 3–16)
BUN BLDV-MCNC: 19 MG/DL (ref 7–20)
CALCIUM SERPL-MCNC: 9.6 MG/DL (ref 8.3–10.6)
CHLORIDE BLD-SCNC: 103 MMOL/L (ref 99–110)
CHOLESTEROL, TOTAL: 79 MG/DL (ref 0–199)
CO2: 28 MMOL/L (ref 21–32)
CREAT SERPL-MCNC: 0.8 MG/DL (ref 0.8–1.3)
GFR AFRICAN AMERICAN: >60
GFR NON-AFRICAN AMERICAN: >60
GLUCOSE BLD-MCNC: 117 MG/DL (ref 70–99)
HCT VFR BLD CALC: 44.1 % (ref 40.5–52.5)
HDLC SERPL-MCNC: 32 MG/DL (ref 40–60)
HEMOGLOBIN: 15.2 G/DL (ref 13.5–17.5)
INR BLD: 1.11 (ref 0.86–1.14)
LDL CHOLESTEROL CALCULATED: 25 MG/DL
MCH RBC QN AUTO: 31.7 PG (ref 26–34)
MCHC RBC AUTO-ENTMCNC: 34.5 G/DL (ref 31–36)
MCV RBC AUTO: 92 FL (ref 80–100)
PDW BLD-RTO: 14.6 % (ref 12.4–15.4)
PLATELET # BLD: 121 K/UL (ref 135–450)
PMV BLD AUTO: 9.3 FL (ref 5–10.5)
POC ACT LR: >400 SEC
POTASSIUM SERPL-SCNC: 3.5 MMOL/L (ref 3.5–5.1)
PROTHROMBIN TIME: 12.6 SEC (ref 9.8–13)
RBC # BLD: 4.79 M/UL (ref 4.2–5.9)
SODIUM BLD-SCNC: 141 MMOL/L (ref 136–145)
TRIGL SERPL-MCNC: 110 MG/DL (ref 0–150)
VLDLC SERPL CALC-MCNC: 22 MG/DL
WBC # BLD: 8.6 K/UL (ref 4–11)

## 2019-07-25 PROCEDURE — C1874 STENT, COATED/COV W/DEL SYS: HCPCS

## 2019-07-25 PROCEDURE — 2580000003 HC RX 258: Performed by: NURSE ANESTHETIST, CERTIFIED REGISTERED

## 2019-07-25 PROCEDURE — 80061 LIPID PANEL: CPT

## 2019-07-25 PROCEDURE — 2500000003 HC RX 250 WO HCPCS: Performed by: NURSE ANESTHETIST, CERTIFIED REGISTERED

## 2019-07-25 PROCEDURE — C1894 INTRO/SHEATH, NON-LASER: HCPCS

## 2019-07-25 PROCEDURE — 92933 PRQ TRLML C ATHRC ST ANGIOP1: CPT | Performed by: INTERNAL MEDICINE

## 2019-07-25 PROCEDURE — 6370000000 HC RX 637 (ALT 250 FOR IP): Performed by: INTERNAL MEDICINE

## 2019-07-25 PROCEDURE — 33990 INSJ PERQ VAD L HRT ARTERIAL: CPT

## 2019-07-25 PROCEDURE — 6360000002 HC RX W HCPCS

## 2019-07-25 PROCEDURE — 2580000003 HC RX 258: Performed by: INTERNAL MEDICINE

## 2019-07-25 PROCEDURE — 2709999900 HC NON-CHARGEABLE SUPPLY

## 2019-07-25 PROCEDURE — C1724 CATH, TRANS ATHEREC,ROTATION: HCPCS

## 2019-07-25 PROCEDURE — 3700000000 HC ANESTHESIA ATTENDED CARE

## 2019-07-25 PROCEDURE — 2580000003 HC RX 258

## 2019-07-25 PROCEDURE — 85347 COAGULATION TIME ACTIVATED: CPT

## 2019-07-25 PROCEDURE — 2500000003 HC RX 250 WO HCPCS

## 2019-07-25 PROCEDURE — 3700000001 HC ADD 15 MINUTES (ANESTHESIA)

## 2019-07-25 PROCEDURE — G0378 HOSPITAL OBSERVATION PER HR: HCPCS

## 2019-07-25 PROCEDURE — C9602 PERC D-E COR STENT ATHER S: HCPCS

## 2019-07-25 PROCEDURE — 85610 PROTHROMBIN TIME: CPT

## 2019-07-25 PROCEDURE — 7100000000 HC PACU RECOVERY - FIRST 15 MIN

## 2019-07-25 PROCEDURE — 6370000000 HC RX 637 (ALT 250 FOR IP)

## 2019-07-25 PROCEDURE — 2720000010 HC SURG SUPPLY STERILE

## 2019-07-25 PROCEDURE — 80048 BASIC METABOLIC PNL TOTAL CA: CPT

## 2019-07-25 PROCEDURE — 93005 ELECTROCARDIOGRAM TRACING: CPT | Performed by: INTERNAL MEDICINE

## 2019-07-25 PROCEDURE — C1769 GUIDE WIRE: HCPCS

## 2019-07-25 PROCEDURE — C1887 CATHETER, GUIDING: HCPCS

## 2019-07-25 PROCEDURE — C1725 CATH, TRANSLUMIN NON-LASER: HCPCS

## 2019-07-25 PROCEDURE — 85027 COMPLETE CBC AUTOMATED: CPT

## 2019-07-25 PROCEDURE — 6360000002 HC RX W HCPCS: Performed by: NURSE ANESTHETIST, CERTIFIED REGISTERED

## 2019-07-25 PROCEDURE — 33990 INSJ PERQ VAD L HRT ARTERIAL: CPT | Performed by: INTERNAL MEDICINE

## 2019-07-25 PROCEDURE — C1760 CLOSURE DEV, VASC: HCPCS

## 2019-07-25 PROCEDURE — 7100000001 HC PACU RECOVERY - ADDTL 15 MIN

## 2019-07-25 RX ORDER — LABETALOL HYDROCHLORIDE 5 MG/ML
5 INJECTION, SOLUTION INTRAVENOUS EVERY 10 MIN PRN
Status: DISCONTINUED | OUTPATIENT
Start: 2019-07-25 | End: 2019-07-26 | Stop reason: HOSPADM

## 2019-07-25 RX ORDER — OXYCODONE HYDROCHLORIDE AND ACETAMINOPHEN 5; 325 MG/1; MG/1
2 TABLET ORAL PRN
Status: ACTIVE | OUTPATIENT
Start: 2019-07-25 | End: 2019-07-25

## 2019-07-25 RX ORDER — LEVOTHYROXINE SODIUM 0.05 MG/1
50 TABLET ORAL DAILY
Status: DISCONTINUED | OUTPATIENT
Start: 2019-07-26 | End: 2019-07-26 | Stop reason: HOSPADM

## 2019-07-25 RX ORDER — CLOPIDOGREL 300 MG/1
300 TABLET, FILM COATED ORAL ONCE
Status: COMPLETED | OUTPATIENT
Start: 2019-07-25 | End: 2019-07-25

## 2019-07-25 RX ORDER — ASPIRIN 81 MG/1
81 TABLET, CHEWABLE ORAL DAILY
Status: DISCONTINUED | OUTPATIENT
Start: 2019-07-26 | End: 2019-07-26 | Stop reason: HOSPADM

## 2019-07-25 RX ORDER — SODIUM CHLORIDE 0.9 % (FLUSH) 0.9 %
10 SYRINGE (ML) INJECTION EVERY 12 HOURS SCHEDULED
Status: DISCONTINUED | OUTPATIENT
Start: 2019-07-25 | End: 2019-07-26 | Stop reason: HOSPADM

## 2019-07-25 RX ORDER — ONDANSETRON 2 MG/ML
INJECTION INTRAMUSCULAR; INTRAVENOUS PRN
Status: DISCONTINUED | OUTPATIENT
Start: 2019-07-25 | End: 2019-07-25 | Stop reason: SDUPTHER

## 2019-07-25 RX ORDER — DEXAMETHASONE SODIUM PHOSPHATE 10 MG/ML
INJECTION INTRAMUSCULAR; INTRAVENOUS PRN
Status: DISCONTINUED | OUTPATIENT
Start: 2019-07-25 | End: 2019-07-25 | Stop reason: SDUPTHER

## 2019-07-25 RX ORDER — ACETAMINOPHEN 325 MG/1
650 TABLET ORAL EVERY 4 HOURS PRN
Status: DISCONTINUED | OUTPATIENT
Start: 2019-07-25 | End: 2019-07-26 | Stop reason: HOSPADM

## 2019-07-25 RX ORDER — HYDRALAZINE HYDROCHLORIDE 20 MG/ML
5 INJECTION INTRAMUSCULAR; INTRAVENOUS EVERY 10 MIN PRN
Status: DISCONTINUED | OUTPATIENT
Start: 2019-07-25 | End: 2019-07-26 | Stop reason: HOSPADM

## 2019-07-25 RX ORDER — LIDOCAINE HYDROCHLORIDE 20 MG/ML
INJECTION, SOLUTION EPIDURAL; INFILTRATION; INTRACAUDAL; PERINEURAL PRN
Status: DISCONTINUED | OUTPATIENT
Start: 2019-07-25 | End: 2019-07-25 | Stop reason: SDUPTHER

## 2019-07-25 RX ORDER — DIPHENHYDRAMINE HYDROCHLORIDE 50 MG/ML
12.5 INJECTION INTRAMUSCULAR; INTRAVENOUS
Status: ACTIVE | OUTPATIENT
Start: 2019-07-25 | End: 2019-07-25

## 2019-07-25 RX ORDER — METOPROLOL SUCCINATE 25 MG/1
25 TABLET, EXTENDED RELEASE ORAL 2 TIMES DAILY WITH MEALS
Status: DISCONTINUED | OUTPATIENT
Start: 2019-07-25 | End: 2019-07-26 | Stop reason: HOSPADM

## 2019-07-25 RX ORDER — SODIUM CHLORIDE 0.9 % (FLUSH) 0.9 %
10 SYRINGE (ML) INJECTION PRN
Status: DISCONTINUED | OUTPATIENT
Start: 2019-07-25 | End: 2019-07-25 | Stop reason: SDUPTHER

## 2019-07-25 RX ORDER — TRIAMTERENE AND HYDROCHLOROTHIAZIDE 37.5; 25 MG/1; MG/1
1 TABLET ORAL EVERY MORNING
Status: DISCONTINUED | OUTPATIENT
Start: 2019-07-26 | End: 2019-07-26 | Stop reason: HOSPADM

## 2019-07-25 RX ORDER — CLOPIDOGREL BISULFATE 75 MG/1
75 TABLET ORAL DAILY
Status: DISCONTINUED | OUTPATIENT
Start: 2019-07-26 | End: 2019-07-26 | Stop reason: HOSPADM

## 2019-07-25 RX ORDER — PROMETHAZINE HYDROCHLORIDE 25 MG/ML
6.25 INJECTION, SOLUTION INTRAMUSCULAR; INTRAVENOUS
Status: ACTIVE | OUTPATIENT
Start: 2019-07-25 | End: 2019-07-25

## 2019-07-25 RX ORDER — ROCURONIUM BROMIDE 10 MG/ML
INJECTION, SOLUTION INTRAVENOUS PRN
Status: DISCONTINUED | OUTPATIENT
Start: 2019-07-25 | End: 2019-07-25 | Stop reason: SDUPTHER

## 2019-07-25 RX ORDER — PROPOFOL 10 MG/ML
INJECTION, EMULSION INTRAVENOUS PRN
Status: DISCONTINUED | OUTPATIENT
Start: 2019-07-25 | End: 2019-07-25 | Stop reason: SDUPTHER

## 2019-07-25 RX ORDER — OXYCODONE HYDROCHLORIDE AND ACETAMINOPHEN 5; 325 MG/1; MG/1
1 TABLET ORAL PRN
Status: ACTIVE | OUTPATIENT
Start: 2019-07-25 | End: 2019-07-25

## 2019-07-25 RX ORDER — ISOSORBIDE MONONITRATE 30 MG/1
15 TABLET, EXTENDED RELEASE ORAL DAILY
Status: DISCONTINUED | OUTPATIENT
Start: 2019-07-26 | End: 2019-07-26 | Stop reason: HOSPADM

## 2019-07-25 RX ORDER — LABETALOL HYDROCHLORIDE 5 MG/ML
INJECTION, SOLUTION INTRAVENOUS PRN
Status: DISCONTINUED | OUTPATIENT
Start: 2019-07-25 | End: 2019-07-25 | Stop reason: SDUPTHER

## 2019-07-25 RX ORDER — MORPHINE SULFATE 4 MG/ML
2 INJECTION, SOLUTION INTRAMUSCULAR; INTRAVENOUS EVERY 5 MIN PRN
Status: DISCONTINUED | OUTPATIENT
Start: 2019-07-25 | End: 2019-07-26 | Stop reason: HOSPADM

## 2019-07-25 RX ORDER — ALLOPURINOL 300 MG/1
300 TABLET ORAL DAILY
Status: DISCONTINUED | OUTPATIENT
Start: 2019-07-26 | End: 2019-07-26 | Stop reason: HOSPADM

## 2019-07-25 RX ORDER — CLOPIDOGREL BISULFATE 75 MG/1
75 TABLET ORAL DAILY
Status: DISCONTINUED | OUTPATIENT
Start: 2019-07-26 | End: 2019-07-25 | Stop reason: SDUPTHER

## 2019-07-25 RX ORDER — ONDANSETRON 2 MG/ML
4 INJECTION INTRAMUSCULAR; INTRAVENOUS EVERY 6 HOURS PRN
Status: DISCONTINUED | OUTPATIENT
Start: 2019-07-25 | End: 2019-07-26 | Stop reason: HOSPADM

## 2019-07-25 RX ORDER — ASPIRIN 81 MG/1
81 TABLET, CHEWABLE ORAL DAILY
Status: DISCONTINUED | OUTPATIENT
Start: 2019-07-26 | End: 2019-07-25 | Stop reason: SDUPTHER

## 2019-07-25 RX ORDER — FENTANYL CITRATE 50 UG/ML
INJECTION, SOLUTION INTRAMUSCULAR; INTRAVENOUS PRN
Status: DISCONTINUED | OUTPATIENT
Start: 2019-07-25 | End: 2019-07-25 | Stop reason: SDUPTHER

## 2019-07-25 RX ORDER — SODIUM CHLORIDE, SODIUM LACTATE, POTASSIUM CHLORIDE, CALCIUM CHLORIDE 600; 310; 30; 20 MG/100ML; MG/100ML; MG/100ML; MG/100ML
INJECTION, SOLUTION INTRAVENOUS CONTINUOUS PRN
Status: DISCONTINUED | OUTPATIENT
Start: 2019-07-25 | End: 2019-07-25 | Stop reason: SDUPTHER

## 2019-07-25 RX ORDER — SODIUM CHLORIDE 0.9 % (FLUSH) 0.9 %
10 SYRINGE (ML) INJECTION PRN
Status: DISCONTINUED | OUTPATIENT
Start: 2019-07-25 | End: 2019-07-26 | Stop reason: HOSPADM

## 2019-07-25 RX ORDER — SODIUM CHLORIDE 0.9 % (FLUSH) 0.9 %
10 SYRINGE (ML) INJECTION EVERY 12 HOURS SCHEDULED
Status: DISCONTINUED | OUTPATIENT
Start: 2019-07-25 | End: 2019-07-25 | Stop reason: SDUPTHER

## 2019-07-25 RX ORDER — NITROGLYCERIN 0.4 MG/1
0.4 TABLET SUBLINGUAL EVERY 5 MIN PRN
Status: DISCONTINUED | OUTPATIENT
Start: 2019-07-25 | End: 2019-07-26 | Stop reason: HOSPADM

## 2019-07-25 RX ORDER — PRIMIDONE 50 MG/1
50 TABLET ORAL 3 TIMES DAILY
Status: DISCONTINUED | OUTPATIENT
Start: 2019-07-25 | End: 2019-07-26 | Stop reason: HOSPADM

## 2019-07-25 RX ORDER — ONDANSETRON 2 MG/ML
4 INJECTION INTRAMUSCULAR; INTRAVENOUS
Status: ACTIVE | OUTPATIENT
Start: 2019-07-25 | End: 2019-07-25

## 2019-07-25 RX ORDER — MORPHINE SULFATE 4 MG/ML
1 INJECTION, SOLUTION INTRAMUSCULAR; INTRAVENOUS EVERY 5 MIN PRN
Status: DISCONTINUED | OUTPATIENT
Start: 2019-07-25 | End: 2019-07-26 | Stop reason: HOSPADM

## 2019-07-25 RX ORDER — SODIUM CHLORIDE 9 MG/ML
INJECTION, SOLUTION INTRAVENOUS CONTINUOUS
Status: DISCONTINUED | OUTPATIENT
Start: 2019-07-25 | End: 2019-07-26 | Stop reason: HOSPADM

## 2019-07-25 RX ORDER — MEPERIDINE HYDROCHLORIDE 50 MG/ML
12.5 INJECTION INTRAMUSCULAR; INTRAVENOUS; SUBCUTANEOUS EVERY 5 MIN PRN
Status: DISCONTINUED | OUTPATIENT
Start: 2019-07-25 | End: 2019-07-26 | Stop reason: HOSPADM

## 2019-07-25 RX ORDER — ATORVASTATIN CALCIUM 80 MG/1
80 TABLET, FILM COATED ORAL DAILY
Status: DISCONTINUED | OUTPATIENT
Start: 2019-07-26 | End: 2019-07-26 | Stop reason: HOSPADM

## 2019-07-25 RX ADMIN — FENTANYL CITRATE 50 MCG: 50 INJECTION, SOLUTION INTRAMUSCULAR; INTRAVENOUS at 09:10

## 2019-07-25 RX ADMIN — SODIUM CHLORIDE, POTASSIUM CHLORIDE, SODIUM LACTATE AND CALCIUM CHLORIDE: 600; 310; 30; 20 INJECTION, SOLUTION INTRAVENOUS at 11:02

## 2019-07-25 RX ADMIN — PRIMIDONE 50 MG: 50 TABLET ORAL at 20:16

## 2019-07-25 RX ADMIN — CLOPIDOGREL 300 MG: 300 TABLET, FILM COATED ORAL at 14:03

## 2019-07-25 RX ADMIN — PROPOFOL 40 MG: 10 INJECTION, EMULSION INTRAVENOUS at 12:46

## 2019-07-25 RX ADMIN — LIDOCAINE HYDROCHLORIDE 50 MG: 20 INJECTION, SOLUTION EPIDURAL; INFILTRATION; INTRACAUDAL; PERINEURAL at 09:10

## 2019-07-25 RX ADMIN — LABETALOL HYDROCHLORIDE 5 MG: 5 INJECTION, SOLUTION INTRAVENOUS at 13:13

## 2019-07-25 RX ADMIN — PROPOFOL 150 MG: 10 INJECTION, EMULSION INTRAVENOUS at 09:10

## 2019-07-25 RX ADMIN — ONDANSETRON 4 MG: 2 INJECTION INTRAMUSCULAR; INTRAVENOUS at 11:53

## 2019-07-25 RX ADMIN — SODIUM CHLORIDE: 9 INJECTION, SOLUTION INTRAVENOUS at 08:48

## 2019-07-25 RX ADMIN — SODIUM CHLORIDE: 9 INJECTION, SOLUTION INTRAVENOUS at 09:23

## 2019-07-25 RX ADMIN — SUGAMMADEX 200 MG: 100 INJECTION, SOLUTION INTRAVENOUS at 12:46

## 2019-07-25 RX ADMIN — DEXAMETHASONE SODIUM PHOSPHATE 8 MG: 10 INJECTION INTRAMUSCULAR; INTRAVENOUS at 11:53

## 2019-07-25 RX ADMIN — METOPROLOL SUCCINATE 25 MG: 25 TABLET, EXTENDED RELEASE ORAL at 17:08

## 2019-07-25 RX ADMIN — SODIUM CHLORIDE: 9 INJECTION, SOLUTION INTRAVENOUS at 16:32

## 2019-07-25 RX ADMIN — ROCURONIUM BROMIDE 40 MG: 10 SOLUTION INTRAVENOUS at 10:43

## 2019-07-25 RX ADMIN — ACETAMINOPHEN 650 MG: 325 TABLET ORAL at 20:16

## 2019-07-25 ASSESSMENT — PULMONARY FUNCTION TESTS
PIF_VALUE: 17
PIF_VALUE: 16
PIF_VALUE: 17
PIF_VALUE: 16
PIF_VALUE: 19
PIF_VALUE: 18
PIF_VALUE: 18
PIF_VALUE: 19
PIF_VALUE: 19
PIF_VALUE: 18
PIF_VALUE: 17
PIF_VALUE: 17
PIF_VALUE: 16
PIF_VALUE: 18
PIF_VALUE: 2
PIF_VALUE: 16
PIF_VALUE: 18
PIF_VALUE: 18
PIF_VALUE: 17
PIF_VALUE: 19
PIF_VALUE: 18
PIF_VALUE: 16
PIF_VALUE: 1
PIF_VALUE: 17
PIF_VALUE: 2
PIF_VALUE: 16
PIF_VALUE: 15
PIF_VALUE: 0
PIF_VALUE: 17
PIF_VALUE: 19
PIF_VALUE: 18
PIF_VALUE: 18
PIF_VALUE: 17
PIF_VALUE: 21
PIF_VALUE: 16
PIF_VALUE: 19
PIF_VALUE: 19
PIF_VALUE: 16
PIF_VALUE: 1
PIF_VALUE: 18
PIF_VALUE: 17
PIF_VALUE: 19
PIF_VALUE: 17
PIF_VALUE: 0
PIF_VALUE: 17
PIF_VALUE: 16
PIF_VALUE: 1
PIF_VALUE: 13
PIF_VALUE: 15
PIF_VALUE: 1
PIF_VALUE: 2
PIF_VALUE: 17
PIF_VALUE: 18
PIF_VALUE: 17
PIF_VALUE: 2
PIF_VALUE: 16
PIF_VALUE: 19
PIF_VALUE: 16
PIF_VALUE: 0
PIF_VALUE: 18
PIF_VALUE: 19
PIF_VALUE: 16
PIF_VALUE: 16
PIF_VALUE: 19
PIF_VALUE: 18
PIF_VALUE: 17
PIF_VALUE: 19
PIF_VALUE: 19
PIF_VALUE: 0
PIF_VALUE: 17
PIF_VALUE: 17
PIF_VALUE: 3
PIF_VALUE: 18
PIF_VALUE: 16
PIF_VALUE: 17
PIF_VALUE: 2
PIF_VALUE: 17
PIF_VALUE: 16
PIF_VALUE: 2
PIF_VALUE: 18
PIF_VALUE: 16
PIF_VALUE: 18
PIF_VALUE: 17
PIF_VALUE: 18
PIF_VALUE: 4
PIF_VALUE: 2
PIF_VALUE: 0
PIF_VALUE: 16
PIF_VALUE: 18
PIF_VALUE: 16
PIF_VALUE: 19
PIF_VALUE: 17
PIF_VALUE: 0
PIF_VALUE: 15
PIF_VALUE: 16
PIF_VALUE: 15
PIF_VALUE: 17
PIF_VALUE: 0
PIF_VALUE: 19
PIF_VALUE: 18
PIF_VALUE: 19
PIF_VALUE: 16
PIF_VALUE: 18
PIF_VALUE: 17
PIF_VALUE: 17
PIF_VALUE: 15
PIF_VALUE: 17
PIF_VALUE: 18
PIF_VALUE: 17
PIF_VALUE: 2
PIF_VALUE: 19
PIF_VALUE: 18
PIF_VALUE: 19
PIF_VALUE: 17
PIF_VALUE: 17
PIF_VALUE: 16
PIF_VALUE: 17
PIF_VALUE: 19
PIF_VALUE: 17
PIF_VALUE: 18
PIF_VALUE: 18
PIF_VALUE: 17
PIF_VALUE: 16
PIF_VALUE: 17
PIF_VALUE: 1
PIF_VALUE: 2
PIF_VALUE: 19
PIF_VALUE: 18
PIF_VALUE: 18
PIF_VALUE: 16
PIF_VALUE: 18
PIF_VALUE: 19
PIF_VALUE: 19
PIF_VALUE: 16
PIF_VALUE: 15
PIF_VALUE: 18
PIF_VALUE: 17
PIF_VALUE: 16
PIF_VALUE: 19
PIF_VALUE: 19
PIF_VALUE: 18
PIF_VALUE: 18
PIF_VALUE: 17
PIF_VALUE: 0
PIF_VALUE: 18
PIF_VALUE: 1
PIF_VALUE: 17
PIF_VALUE: 18
PIF_VALUE: 17
PIF_VALUE: 19
PIF_VALUE: 15
PIF_VALUE: 17
PIF_VALUE: 18
PIF_VALUE: 17
PIF_VALUE: 18
PIF_VALUE: 1
PIF_VALUE: 0
PIF_VALUE: 15
PIF_VALUE: 17
PIF_VALUE: 19
PIF_VALUE: 18
PIF_VALUE: 16
PIF_VALUE: 18
PIF_VALUE: 18
PIF_VALUE: 15
PIF_VALUE: 2
PIF_VALUE: 16
PIF_VALUE: 17
PIF_VALUE: 18
PIF_VALUE: 19
PIF_VALUE: 19
PIF_VALUE: 18
PIF_VALUE: 17
PIF_VALUE: 35
PIF_VALUE: 18
PIF_VALUE: 17
PIF_VALUE: 18
PIF_VALUE: 18
PIF_VALUE: 17
PIF_VALUE: 18
PIF_VALUE: 18
PIF_VALUE: 17
PIF_VALUE: 19
PIF_VALUE: 17
PIF_VALUE: 2
PIF_VALUE: 16
PIF_VALUE: 18
PIF_VALUE: 18
PIF_VALUE: 12
PIF_VALUE: 17
PIF_VALUE: 18
PIF_VALUE: 18
PIF_VALUE: 17
PIF_VALUE: 0
PIF_VALUE: 18
PIF_VALUE: 16
PIF_VALUE: 19
PIF_VALUE: 16
PIF_VALUE: 18
PIF_VALUE: 17
PIF_VALUE: 17
PIF_VALUE: 2
PIF_VALUE: 17
PIF_VALUE: 16
PIF_VALUE: 18
PIF_VALUE: 16
PIF_VALUE: 18
PIF_VALUE: 17
PIF_VALUE: 1
PIF_VALUE: 2
PIF_VALUE: 18
PIF_VALUE: 17
PIF_VALUE: 18
PIF_VALUE: 18
PIF_VALUE: 17
PIF_VALUE: 18
PIF_VALUE: 2
PIF_VALUE: 18
PIF_VALUE: 15

## 2019-07-25 ASSESSMENT — PAIN SCALES - GENERAL
PAINLEVEL_OUTOF10: 0
PAINLEVEL_OUTOF10: 8

## 2019-07-25 NOTE — ANESTHESIA POSTPROCEDURE EVALUATION
Department of Anesthesiology  Postprocedure Note    Patient: Ginny Akbar  MRN: [id number]  YOB: 1933  Date of evaluation: 7/25/2019  Time:  5:27 PM     Procedure Summary     Date:  07/25/19 Room / Location:  [facility] Cardiac Cath Lab    Anesthesia Start:  0902 Anesthesia Stop:  8423    Procedure:  PTCA/STENT Diagnosis:       Atherosclerotic heart disease of native coronary artery without angina pectoris      Angina, class III (HCC)      Angina, class III ([address]      Atherosclerotic heart disease of native coronary artery without angina pectoris    Scheduled Providers:   Responsible Provider:  Moon Pete MD    Anesthesia Type:  general ASA Status:  4          Anesthesia Type: general    Kemar Phase I:      Kemar Phase II:      Last vitals: Reviewed and per EMR flowsheets. Anesthesia Post Evaluation    Patient location during evaluation: PACU  Patient participation: complete - patient participated  Level of consciousness: awake and alert  Airway patency: patent  Nausea & Vomiting: no nausea and no vomiting  Complications: no  Cardiovascular status: blood pressure returned to baseline  Respiratory status: acceptable  Hydration status: euvolemic  Comments: VSS on transfer to phase 2 recovery. No anesthetic complications.

## 2019-07-25 NOTE — ANESTHESIA PRE PROCEDURE
found for: PREGTESTUR, PREGSERUM, HCG, HCGQUANT     ABGs: No results found for: PHART, PO2ART, QVV5VSO, WOT4MMS, BEART, X2GITFZO     Type & Screen (If Applicable):  No results found for: LABABO, LABRH    Anesthesia Evaluation   no history of anesthetic complications:   Airway: Mallampati: II  TM distance: <3 FB   Neck ROM: limited  Mouth opening: > = 3 FB Dental:    (+) upper dentures      Pulmonary:                              Cardiovascular:    (+) hypertension:, CAD:, hyperlipidemia            Echocardiogram reviewed         Beta Blocker:  Dose within 24 Hrs         Neuro/Psych:               GI/Hepatic/Renal: Neg GI/Hepatic/Renal ROS            Endo/Other: Negative Endo/Other ROS                    Abdominal:           Vascular:                                     Pre-Operative Diagnosis: Atherosclerotic heart disease of native coronary artery without angina pectoris [I25.10]    80 y.o.   BMI:  Body mass index is 31.32 kg/m². Vitals:    07/25/19 0734   Weight: 206 lb (93.4 kg)   Height: 5' 8\" (1.727 m)       No Known Allergies    Social History     Tobacco Use    Smoking status: Never Smoker    Smokeless tobacco: Never Used   Substance Use Topics    Alcohol use: Not Currently       LABS:    CBC  Lab Results   Component Value Date/Time    WBC 8.6 07/25/2019 07:18 AM    HGB 15.2 07/25/2019 07:18 AM    HCT 44.1 07/25/2019 07:18 AM     (L) 07/25/2019 07:18 AM     RENAL  Lab Results   Component Value Date/Time     07/25/2019 07:18 AM    K 3.5 07/25/2019 07:18 AM     07/25/2019 07:18 AM    CO2 28 07/25/2019 07:18 AM    BUN 19 07/25/2019 07:18 AM    CREATININE 0.8 07/25/2019 07:18 AM    GLUCOSE 117 (H) 07/25/2019 07:18 AM     COAGS  Lab Results   Component Value Date/Time    PROTIME 12.6 07/25/2019 07:18 AM    INR 1.11 07/25/2019 07:18 AM        Anesthesia Plan      general     ASA 4     (Risks, benefits and alternatives of GA anesthesia discussed with pt. Questions answered.   Willing to

## 2019-07-25 NOTE — PROCEDURES
CARDIAC CATHETERIZATION REPORT     Procedure Date:  2019  Patient Name: Davey Aldrich  MRN: [id number] : 1933        INDICATION     Angina, class 3      PROCEDURES PERFORMED       Left heart catheterization  Coronary angiogam  Coronary cath    Temp TV pacer insertion and removal    Insertion of short term external heart assist system into heart, intraoperative, percutaneous approach  Assistance with cardiac output using impeller pump, continuous    Rotational atherectomy (rotablator) of LAD  High risk PCI of LM/LAD with 3 drug eluting stents          PROCEDURE DESCRIPTION   Risks/benefits/alternatives/outcomes were discussed with patient and/or family and informed consent was obtained. Patient was prepped draped in the usual sterile fashion. Local anaesthetic was applied over puncture sites. Using seldinger technique, a 7 Bengali sheath was inserted into the right femoral artery, a 6fr sheath was inserted into rt CFV, 7fr sheath into left cfa. Percloses were deployed on left side using perclose technique. Temp TV pacer was inserted/removed through the venous line. The left cfa sheath was exchanged for impella sheath. LV was crossed with pigtail through impella sheath and then an 0.018\" wire was inserted into LV and impella CP device was inserted over 0.018\" wire. Wire was removed and impella was positioned and good CO was noted. Attention turned to PCI as noted below. At end of case, [place] was weaned and removed. Impella sheath was then removed using dry close technique with 14mm balloon taken up and over from right side. Percloses were deployed and hemostasis was obtained with manual pressure as well. Temp tv pacer was removed and rt sided sheaths were removed. There were no immediate complications. FINDINGS           Left heart cath    LVEDP 14         CORONARY ARTERIES      See diagnostic cath from 19 for findings.         PERCUTANEOUS INTERVENTION DESCRIPTION

## 2019-07-26 VITALS
HEART RATE: 88 BPM | RESPIRATION RATE: 16 BRPM | TEMPERATURE: 97.7 F | SYSTOLIC BLOOD PRESSURE: 120 MMHG | HEIGHT: 68 IN | WEIGHT: 206 LBS | OXYGEN SATURATION: 95 % | DIASTOLIC BLOOD PRESSURE: 68 MMHG | BODY MASS INDEX: 31.22 KG/M2

## 2019-07-26 LAB
ANION GAP SERPL CALCULATED.3IONS-SCNC: 9 MMOL/L (ref 3–16)
BUN BLDV-MCNC: 16 MG/DL (ref 7–20)
CALCIUM SERPL-MCNC: 8.7 MG/DL (ref 8.3–10.6)
CHLORIDE BLD-SCNC: 105 MMOL/L (ref 99–110)
CO2: 24 MMOL/L (ref 21–32)
CREAT SERPL-MCNC: 0.8 MG/DL (ref 0.8–1.3)
EKG ATRIAL RATE: 63 BPM
EKG ATRIAL RATE: 77 BPM
EKG DIAGNOSIS: NORMAL
EKG DIAGNOSIS: NORMAL
EKG P AXIS: 51 DEGREES
EKG P AXIS: 54 DEGREES
EKG P-R INTERVAL: 202 MS
EKG P-R INTERVAL: 208 MS
EKG Q-T INTERVAL: 410 MS
EKG Q-T INTERVAL: 420 MS
EKG QRS DURATION: 84 MS
EKG QRS DURATION: 86 MS
EKG QTC CALCULATION (BAZETT): 429 MS
EKG QTC CALCULATION (BAZETT): 463 MS
EKG R AXIS: -9 DEGREES
EKG R AXIS: 0 DEGREES
EKG T AXIS: 25 DEGREES
EKG T AXIS: 7 DEGREES
EKG VENTRICULAR RATE: 63 BPM
EKG VENTRICULAR RATE: 77 BPM
GFR AFRICAN AMERICAN: >60
GFR NON-AFRICAN AMERICAN: >60
GLUCOSE BLD-MCNC: 180 MG/DL (ref 70–99)
HCT VFR BLD CALC: 38 % (ref 40.5–52.5)
HEMOGLOBIN: 12.9 G/DL (ref 13.5–17.5)
MCH RBC QN AUTO: 31.1 PG (ref 26–34)
MCHC RBC AUTO-ENTMCNC: 33.8 G/DL (ref 31–36)
MCV RBC AUTO: 92 FL (ref 80–100)
PDW BLD-RTO: 14.5 % (ref 12.4–15.4)
PLATELET # BLD: 112 K/UL (ref 135–450)
PMV BLD AUTO: 8.9 FL (ref 5–10.5)
POTASSIUM SERPL-SCNC: 3.8 MMOL/L (ref 3.5–5.1)
RBC # BLD: 4.14 M/UL (ref 4.2–5.9)
SODIUM BLD-SCNC: 138 MMOL/L (ref 136–145)
WBC # BLD: 11.6 K/UL (ref 4–11)

## 2019-07-26 PROCEDURE — 93010 ELECTROCARDIOGRAM REPORT: CPT | Performed by: INTERNAL MEDICINE

## 2019-07-26 PROCEDURE — 93005 ELECTROCARDIOGRAM TRACING: CPT | Performed by: INTERNAL MEDICINE

## 2019-07-26 PROCEDURE — 6370000000 HC RX 637 (ALT 250 FOR IP): Performed by: INTERNAL MEDICINE

## 2019-07-26 PROCEDURE — 6360000002 HC RX W HCPCS: Performed by: INTERNAL MEDICINE

## 2019-07-26 PROCEDURE — 80048 BASIC METABOLIC PNL TOTAL CA: CPT

## 2019-07-26 PROCEDURE — 85027 COMPLETE CBC AUTOMATED: CPT

## 2019-07-26 PROCEDURE — 2580000003 HC RX 258: Performed by: INTERNAL MEDICINE

## 2019-07-26 PROCEDURE — 36415 COLL VENOUS BLD VENIPUNCTURE: CPT

## 2019-07-26 PROCEDURE — 99217 PR OBSERVATION CARE DISCHARGE MANAGEMENT: CPT | Performed by: INTERNAL MEDICINE

## 2019-07-26 RX ADMIN — Medication 10 ML: at 08:27

## 2019-07-26 RX ADMIN — METOPROLOL SUCCINATE 25 MG: 25 TABLET, EXTENDED RELEASE ORAL at 08:25

## 2019-07-26 RX ADMIN — TRIAMTERENE AND HYDROCHLOROTHIAZIDE 1 TABLET: 37.5; 25 TABLET ORAL at 08:26

## 2019-07-26 RX ADMIN — ALLOPURINOL 300 MG: 300 TABLET ORAL at 08:26

## 2019-07-26 RX ADMIN — CLOPIDOGREL BISULFATE 75 MG: 75 TABLET ORAL at 08:25

## 2019-07-26 RX ADMIN — ENOXAPARIN SODIUM 40 MG: 40 INJECTION SUBCUTANEOUS at 08:25

## 2019-07-26 RX ADMIN — ASPIRIN 81 MG 81 MG: 81 TABLET ORAL at 08:26

## 2019-07-26 RX ADMIN — ISOSORBIDE MONONITRATE 15 MG: 30 TABLET, EXTENDED RELEASE ORAL at 08:26

## 2019-07-26 RX ADMIN — PRIMIDONE 50 MG: 50 TABLET ORAL at 08:26

## 2019-07-26 RX ADMIN — LEVOTHYROXINE SODIUM 50 MCG: 50 TABLET ORAL at 05:57

## 2019-07-26 RX ADMIN — ATORVASTATIN CALCIUM 80 MG: 80 TABLET, FILM COATED ORAL at 08:25

## 2019-07-26 NOTE — DISCHARGE INSTR - COC
Continuity of Care Form    Patient Name: Brook Burden   :  1933  MRN:  [id number]    Admit date:  2019  Discharge date:  ***    Code Status Order: Full Code   Advance Directives:     Admitting Physician:  Wing Shaw MD  PCP: Ramila Vaz MD    Discharging Nurse: [facility] Unit/Room#: 7722/1467-56  Discharging Unit Phone Number: ***    Emergency Contact:   Extended Emergency Contact Information  Primary Emergency Contact: anant walters  Home Phone: [phone]  Relation: Spouse  Secondary Emergency Contact: harmony walters  Home Phone: [phone]  Relation: Child    Past Surgical History:  Past Surgical History:   Procedure Laterality Date    CARDIAC CATHETERIZATION  2016    x1  BRET to LAD    JOINT REPLACEMENT Left        Immunization History: There is no immunization history on file for this patient.     Active Problems:  Patient Active Problem List   Diagnosis Code    Coronary artery disease involving native coronary artery of native heart without angina pectoris I25.10    Essential hypertension I10    Mixed hyperlipidemia E78.2    Obesity E66.9    Angina, class III (HCC) I20.9       Isolation/Infection:   Isolation          No Isolation            Nurse Assessment:  Last Vital Signs: /67   Pulse 79   Temp 98.5 °F (36.9 °C) (Oral)   Resp 18   Ht 5' 8\" (1.727 m)   Wt 206 lb (93.4 kg)   SpO2 94%   BMI 31.32 kg/m²     Last documented pain score (0-10 scale): Pain Level: 8  Last Weight:   Wt Readings from Last 1 Encounters:   19 206 lb (93.4 kg)     Mental Status:  {IP PT MENTAL STATUS:19143}    IV Access:  {[facility] IV ACCESS:369364389}    Nursing Mobility/ADLs:  Walking   {CHP DME YWM}  Transfer  {CHP DME PEWI:844748524}  Bathing  {CHP DME BKJ}  Dressing  {CHP DME RKTH:509812971}  Toileting  {CHP DME UJKN:886775994}  Feeding  {CHP DME KEQQ:468418154}  Med Admin  {CHP DME KUOT:995617600}  Med Delivery   [address] [place] [address] MED

## 2019-08-08 NOTE — PROGRESS NOTES
effusion      Conclusions:    Normal LV size and function, EF is 60-65 percent, abnormal relaxation      Stress Test: 09/01/17  Final Conclusions/Summary     Stress ECG Impressions:  No significant ST changes during vasodilator infusion    Summary:  Normal myocardial perfusion imaging stress test EF 63 percent No regional wall motion abnormality     Stress test: 03/2019    Cath: 12/22/16  FINDINGS         CORONARY ARTERIES    Please see dr guerra's report regarding coronary anatomy        PERCUTANEOUS INTERVENTION DESCRIPTION     Bivalirudin was used for anticoagulation.  A 6fr VL3.5 guide was   used to intubate the LM.  A choice floppy wire was used to cross   the lesion.  A boston sci  catheter/balloon was used to   cross the lesion. Choice floppy wire was exchanged for csi wire   in anticipation of atherectomy.   catheter was removed.    CSI atherectomy of LAD was performed with 1.25mm crown.  Lesion   was then stented with boston sci synergy [id number] BRET and boston   sci synergy 3x8mm BRET.   Stents were implanted to avoid jailing of   D2.  CSI wire was removed and a choice floppy wire was advanced   through stents as there was wire bias preventing postdilation.    With choice floppy wire, Stents were postdilated with 3mm NC   balloon.   There was 0% residual stenosis.  There was RADHA 3 flow   before and after PCI.          CONCLUSIONS:     Successful PCI of LAD of two drug eluting stents    Cardiac cath: 07/16/19  FINDINGS               LVGRAM     LVEDP 7   GRADIENT ACROSS AORTIC VALVE None   LV FUNCTION EF 50%   WALL MOTION Normal   MITRAL REGURGITATION Mild            CORONARY ARTERIES     LM Calcified. Distal 20-30% stenosis. LAD Severely calcified. Ostial 85-90% stenosis. Prox 75% stenosis. Prox-mid vessel is stented with Mid 50% stenosis. Distal vessel has 60-70% stenosis. D1 and D2 have ostial 90% stenoses. LCX Calcified. Prox-mid 40% stenosis.   Mid-distal 50-60%

## 2019-08-09 ENCOUNTER — OFFICE VISIT (OUTPATIENT)
Dept: CARDIOLOGY CLINIC | Age: 84
End: 2019-08-09
Payer: MEDICARE

## 2019-08-09 ENCOUNTER — HOSPITAL ENCOUNTER (OUTPATIENT)
Dept: GENERAL RADIOLOGY | Age: 84
Discharge: HOME | End: 2019-08-09
Payer: MEDICARE

## 2019-08-09 ENCOUNTER — HOSPITAL ENCOUNTER (OUTPATIENT)
Age: 84
Discharge: HOME | End: 2019-08-09
Payer: MEDICARE

## 2019-08-09 VITALS
OXYGEN SATURATION: 96 % | HEIGHT: 68 IN | SYSTOLIC BLOOD PRESSURE: 100 MMHG | BODY MASS INDEX: 30.4 KG/M2 | DIASTOLIC BLOOD PRESSURE: 60 MMHG | WEIGHT: 200.6 LBS | HEART RATE: 73 BPM

## 2019-08-09 DIAGNOSIS — R06.02 SOB (SHORTNESS OF BREATH): ICD-10-CM

## 2019-08-09 DIAGNOSIS — R05.9 COUGH: ICD-10-CM

## 2019-08-09 DIAGNOSIS — I10 ESSENTIAL HYPERTENSION: ICD-10-CM

## 2019-08-09 DIAGNOSIS — E78.2 MIXED HYPERLIPIDEMIA: ICD-10-CM

## 2019-08-09 DIAGNOSIS — I25.10 CORONARY ARTERY DISEASE INVOLVING NATIVE CORONARY ARTERY OF NATIVE HEART WITHOUT ANGINA PECTORIS: Primary | ICD-10-CM

## 2019-08-09 DIAGNOSIS — I25.10 CORONARY ARTERY DISEASE INVOLVING NATIVE CORONARY ARTERY OF NATIVE HEART WITHOUT ANGINA PECTORIS: ICD-10-CM

## 2019-08-09 LAB
ANION GAP SERPL CALCULATED.3IONS-SCNC: 14 MMOL/L (ref 3–16)
BASOPHILS ABSOLUTE: 0.1 K/UL (ref 0–0.2)
BASOPHILS RELATIVE PERCENT: 1 %
BUN BLDV-MCNC: 13 MG/DL (ref 7–20)
CALCIUM SERPL-MCNC: 9.7 MG/DL (ref 8.3–10.6)
CHLORIDE BLD-SCNC: 100 MMOL/L (ref 99–110)
CO2: 24 MMOL/L (ref 21–32)
CREAT SERPL-MCNC: 0.8 MG/DL (ref 0.8–1.3)
EOSINOPHILS ABSOLUTE: 1.1 K/UL (ref 0–0.6)
EOSINOPHILS RELATIVE PERCENT: 13 %
GFR AFRICAN AMERICAN: >60
GFR NON-AFRICAN AMERICAN: >60
GLUCOSE BLD-MCNC: 115 MG/DL (ref 70–99)
HCT VFR BLD CALC: 41.1 % (ref 40.5–52.5)
HEMOGLOBIN: 14 G/DL (ref 13.5–17.5)
LYMPHOCYTES ABSOLUTE: 1 K/UL (ref 1–5.1)
LYMPHOCYTES RELATIVE PERCENT: 12.4 %
MCH RBC QN AUTO: 31.5 PG (ref 26–34)
MCHC RBC AUTO-ENTMCNC: 34.2 G/DL (ref 31–36)
MCV RBC AUTO: 92.3 FL (ref 80–100)
MONOCYTES ABSOLUTE: 0.7 K/UL (ref 0–1.3)
MONOCYTES RELATIVE PERCENT: 9.1 %
NEUTROPHILS ABSOLUTE: 5.2 K/UL (ref 1.7–7.7)
NEUTROPHILS RELATIVE PERCENT: 64.5 %
PDW BLD-RTO: 14.7 % (ref 12.4–15.4)
PLATELET # BLD: 144 K/UL (ref 135–450)
PMV BLD AUTO: 9.9 FL (ref 5–10.5)
POTASSIUM SERPL-SCNC: 3.5 MMOL/L (ref 3.5–5.1)
RBC # BLD: 4.45 M/UL (ref 4.2–5.9)
SODIUM BLD-SCNC: 138 MMOL/L (ref 136–145)
WBC # BLD: 8.1 K/UL (ref 4–11)

## 2019-08-09 PROCEDURE — G8598 ASA/ANTIPLAT THER USED: HCPCS | Performed by: INTERNAL MEDICINE

## 2019-08-09 PROCEDURE — 85025 COMPLETE CBC W/AUTO DIFF WBC: CPT

## 2019-08-09 PROCEDURE — 1111F DSCHRG MED/CURRENT MED MERGE: CPT | Performed by: INTERNAL MEDICINE

## 2019-08-09 PROCEDURE — 99214 OFFICE O/P EST MOD 30 MIN: CPT | Performed by: INTERNAL MEDICINE

## 2019-08-09 PROCEDURE — G8427 DOCREV CUR MEDS BY ELIG CLIN: HCPCS | Performed by: INTERNAL MEDICINE

## 2019-08-09 PROCEDURE — 36415 COLL VENOUS BLD VENIPUNCTURE: CPT

## 2019-08-09 PROCEDURE — 1036F TOBACCO NON-USER: CPT | Performed by: INTERNAL MEDICINE

## 2019-08-09 PROCEDURE — 93000 ELECTROCARDIOGRAM COMPLETE: CPT | Performed by: INTERNAL MEDICINE

## 2019-08-09 PROCEDURE — 4040F PNEUMOC VAC/ADMIN/RCVD: CPT | Performed by: INTERNAL MEDICINE

## 2019-08-09 PROCEDURE — 71046 X-RAY EXAM CHEST 2 VIEWS: CPT

## 2019-08-09 PROCEDURE — G8417 CALC BMI ABV UP PARAM F/U: HCPCS | Performed by: INTERNAL MEDICINE

## 2019-08-09 PROCEDURE — 1123F ACP DISCUSS/DSCN MKR DOCD: CPT | Performed by: INTERNAL MEDICINE

## 2019-08-09 PROCEDURE — 80048 BASIC METABOLIC PNL TOTAL CA: CPT

## 2019-08-09 NOTE — PATIENT INSTRUCTIONS
1. Cardiac rehab at [facility]  2. Stop imdur due to headaches  3. Chest xray today for cough and shortness of breath  4. BMP and CBC today  5.  Follow up in 1-2 months

## 2019-08-12 NOTE — TELEPHONE ENCOUNTER
Let patient know their lab test is stable to improved, continue current meds, no new orders or changes at this time.  Thanks. Spoke with Pt, relayed lab results and chest xray results per SRJ. Pt verbalized understanding. He has a pulmonologist and can not remember the name. He will figure out who it is and contact the office. Pt would like spirometer sent to [facility].

## 2019-10-09 ENCOUNTER — OFFICE VISIT (OUTPATIENT)
Dept: CARDIOLOGY CLINIC | Age: 84
End: 2019-10-09
Payer: MEDICARE

## 2019-10-09 VITALS
SYSTOLIC BLOOD PRESSURE: 116 MMHG | WEIGHT: 203 LBS | BODY MASS INDEX: 35.97 KG/M2 | HEART RATE: 77 BPM | OXYGEN SATURATION: 94 % | HEIGHT: 63 IN | DIASTOLIC BLOOD PRESSURE: 72 MMHG

## 2019-10-09 DIAGNOSIS — I20.9 ANGINA, CLASS III (HCC): ICD-10-CM

## 2019-10-09 DIAGNOSIS — E78.2 MIXED HYPERLIPIDEMIA: ICD-10-CM

## 2019-10-09 DIAGNOSIS — I25.10 CORONARY ARTERY DISEASE INVOLVING NATIVE CORONARY ARTERY OF NATIVE HEART WITHOUT ANGINA PECTORIS: Primary | ICD-10-CM

## 2019-10-09 DIAGNOSIS — I10 ESSENTIAL HYPERTENSION: ICD-10-CM

## 2019-10-09 PROCEDURE — G8417 CALC BMI ABV UP PARAM F/U: HCPCS | Performed by: INTERNAL MEDICINE

## 2019-10-09 PROCEDURE — 1036F TOBACCO NON-USER: CPT | Performed by: INTERNAL MEDICINE

## 2019-10-09 PROCEDURE — 1123F ACP DISCUSS/DSCN MKR DOCD: CPT | Performed by: INTERNAL MEDICINE

## 2019-10-09 PROCEDURE — G8484 FLU IMMUNIZE NO ADMIN: HCPCS | Performed by: INTERNAL MEDICINE

## 2019-10-09 PROCEDURE — 4040F PNEUMOC VAC/ADMIN/RCVD: CPT | Performed by: INTERNAL MEDICINE

## 2019-10-09 PROCEDURE — 99213 OFFICE O/P EST LOW 20 MIN: CPT | Performed by: INTERNAL MEDICINE

## 2019-10-09 PROCEDURE — G8598 ASA/ANTIPLAT THER USED: HCPCS | Performed by: INTERNAL MEDICINE

## 2019-10-09 PROCEDURE — G8427 DOCREV CUR MEDS BY ELIG CLIN: HCPCS | Performed by: INTERNAL MEDICINE

## 2019-10-09 RX ORDER — ATORVASTATIN CALCIUM 40 MG/1
40 TABLET, FILM COATED ORAL DAILY
Qty: 30 TABLET | Refills: 11 | Status: SHIPPED | OUTPATIENT
Start: 2019-10-09

## 2019-10-11 LAB
A/G RATIO: 1.3
ALBUMIN SERPL-MCNC: 4.5 G/DL
ALP BLD-CCNC: 70 U/L
ALT SERPL-CCNC: 41 U/L
AST SERPL-CCNC: 37 U/L
BILIRUB SERPL-MCNC: 0.7 MG/DL (ref 0.1–1.4)
BILIRUBIN DIRECT: 0.2 MG/DL
BILIRUBIN, INDIRECT: 0.5
CHOLESTEROL, TOTAL: 97 MG/DL
CHOLESTEROL/HDL RATIO: NORMAL
GLOBULIN: 3.6
HDLC SERPL-MCNC: 37 MG/DL (ref 35–70)
LDL CHOLESTEROL CALCULATED: 37 MG/DL (ref 0–160)
PROTEIN TOTAL: 8.1 G/DL
TRIGL SERPL-MCNC: 116 MG/DL
VLDLC SERPL CALC-MCNC: 23 MG/DL

## 2020-06-16 ENCOUNTER — TELEPHONE (OUTPATIENT)
Dept: CARDIOLOGY CLINIC | Age: 85
End: 2020-06-16

## 2020-06-16 NOTE — TELEPHONE ENCOUNTER
Pt's son contacted the office and sts that the pt is experiencing more frequent CP. The pt sts that when ever he bends over or just does anything that is exertion there is bad CP but in a sitting or resting he does not feel the pain. Pain scale 0-10 ( 10 being the worse) 8-9 and started a few days ago.  Please call the son Ally Arnold [phone] , please advise, thank you

## 2020-07-08 ENCOUNTER — APPOINTMENT (OUTPATIENT)
Dept: GENERAL RADIOLOGY | Age: 85
DRG: 234 | End: 2020-07-08
Payer: MEDICARE

## 2020-07-08 ENCOUNTER — HOSPITAL ENCOUNTER (INPATIENT)
Age: 85
LOS: 11 days | Discharge: NURSING, REHAB OR LONG-TERM CARE | DRG: 234 | End: 2020-07-19
Attending: EMERGENCY MEDICINE | Admitting: THORACIC SURGERY (CARDIOTHORACIC VASCULAR SURGERY)
Payer: MEDICARE

## 2020-07-08 PROBLEM — I20.0 UNSTABLE ANGINA (HCC): Status: ACTIVE | Noted: 2020-07-08

## 2020-07-08 LAB
A/G RATIO: 1.3 (ref 1.1–2.2)
ALBUMIN SERPL-MCNC: 4.4 G/DL (ref 3.4–5)
ALP BLD-CCNC: 69 U/L (ref 40–129)
ALT SERPL-CCNC: 42 U/L (ref 10–40)
ANION GAP SERPL CALCULATED.3IONS-SCNC: 10 MMOL/L (ref 3–16)
AST SERPL-CCNC: 38 U/L (ref 15–37)
BASOPHILS ABSOLUTE: 0.1 K/UL (ref 0–0.2)
BASOPHILS RELATIVE PERCENT: 0.7 %
BILIRUB SERPL-MCNC: 0.9 MG/DL (ref 0–1)
BUN BLDV-MCNC: 21 MG/DL (ref 7–20)
CALCIUM SERPL-MCNC: 9.5 MG/DL (ref 8.3–10.6)
CHLORIDE BLD-SCNC: 103 MMOL/L (ref 99–110)
CO2: 25 MMOL/L (ref 21–32)
CREAT SERPL-MCNC: 1 MG/DL (ref 0.8–1.3)
EKG ATRIAL RATE: 108 BPM
EKG DIAGNOSIS: NORMAL
EKG P AXIS: 57 DEGREES
EKG P-R INTERVAL: 190 MS
EKG Q-T INTERVAL: 334 MS
EKG QRS DURATION: 74 MS
EKG QTC CALCULATION (BAZETT): 447 MS
EKG R AXIS: -4 DEGREES
EKG T AXIS: 38 DEGREES
EKG VENTRICULAR RATE: 108 BPM
EOSINOPHILS ABSOLUTE: 1.4 K/UL (ref 0–0.6)
EOSINOPHILS RELATIVE PERCENT: 13.2 %
GFR AFRICAN AMERICAN: >60
GFR NON-AFRICAN AMERICAN: >60
GLOBULIN: 3.3 G/DL
GLUCOSE BLD-MCNC: 134 MG/DL (ref 70–99)
HCT VFR BLD CALC: 44.7 % (ref 40.5–52.5)
HEMOGLOBIN: 15.6 G/DL (ref 13.5–17.5)
LYMPHOCYTES ABSOLUTE: 1.3 K/UL (ref 1–5.1)
LYMPHOCYTES RELATIVE PERCENT: 11.9 %
MCH RBC QN AUTO: 31.6 PG (ref 26–34)
MCHC RBC AUTO-ENTMCNC: 34.8 G/DL (ref 31–36)
MCV RBC AUTO: 90.7 FL (ref 80–100)
MONOCYTES ABSOLUTE: 0.8 K/UL (ref 0–1.3)
MONOCYTES RELATIVE PERCENT: 7.1 %
NEUTROPHILS ABSOLUTE: 7.2 K/UL (ref 1.7–7.7)
NEUTROPHILS RELATIVE PERCENT: 67.1 %
PDW BLD-RTO: 14 % (ref 12.4–15.4)
PLATELET # BLD: 132 K/UL (ref 135–450)
PMV BLD AUTO: 9.6 FL (ref 5–10.5)
POTASSIUM REFLEX MAGNESIUM: 3.7 MMOL/L (ref 3.5–5.1)
RBC # BLD: 4.93 M/UL (ref 4.2–5.9)
SODIUM BLD-SCNC: 138 MMOL/L (ref 136–145)
TOTAL PROTEIN: 7.7 G/DL (ref 6.4–8.2)
TROPONIN: 0.03 NG/ML
TROPONIN: 0.04 NG/ML
TROPONIN: 0.05 NG/ML
WBC # BLD: 10.7 K/UL (ref 4–11)

## 2020-07-08 PROCEDURE — 6370000000 HC RX 637 (ALT 250 FOR IP): Performed by: INTERNAL MEDICINE

## 2020-07-08 PROCEDURE — 2580000003 HC RX 258: Performed by: PHYSICIAN ASSISTANT

## 2020-07-08 PROCEDURE — 96372 THER/PROPH/DIAG INJ SC/IM: CPT

## 2020-07-08 PROCEDURE — 99285 EMERGENCY DEPT VISIT HI MDM: CPT

## 2020-07-08 PROCEDURE — 6370000000 HC RX 637 (ALT 250 FOR IP): Performed by: PHYSICIAN ASSISTANT

## 2020-07-08 PROCEDURE — 6360000002 HC RX W HCPCS: Performed by: PHYSICIAN ASSISTANT

## 2020-07-08 PROCEDURE — 93005 ELECTROCARDIOGRAM TRACING: CPT | Performed by: EMERGENCY MEDICINE

## 2020-07-08 PROCEDURE — 6370000000 HC RX 637 (ALT 250 FOR IP): Performed by: NURSE PRACTITIONER

## 2020-07-08 PROCEDURE — 80053 COMPREHEN METABOLIC PANEL: CPT

## 2020-07-08 PROCEDURE — 2580000003 HC RX 258: Performed by: INTERNAL MEDICINE

## 2020-07-08 PROCEDURE — 93010 ELECTROCARDIOGRAM REPORT: CPT | Performed by: INTERNAL MEDICINE

## 2020-07-08 PROCEDURE — 84484 ASSAY OF TROPONIN QUANT: CPT

## 2020-07-08 PROCEDURE — 1200000000 HC SEMI PRIVATE

## 2020-07-08 PROCEDURE — 36415 COLL VENOUS BLD VENIPUNCTURE: CPT

## 2020-07-08 PROCEDURE — 85025 COMPLETE CBC W/AUTO DIFF WBC: CPT

## 2020-07-08 PROCEDURE — 71045 X-RAY EXAM CHEST 1 VIEW: CPT

## 2020-07-08 RX ORDER — SODIUM CHLORIDE 0.9 % (FLUSH) 0.9 %
10 SYRINGE (ML) INJECTION EVERY 12 HOURS SCHEDULED
Status: DISCONTINUED | OUTPATIENT
Start: 2020-07-08 | End: 2020-07-13

## 2020-07-08 RX ORDER — 0.9 % SODIUM CHLORIDE 0.9 %
1000 INTRAVENOUS SOLUTION INTRAVENOUS ONCE
Status: COMPLETED | OUTPATIENT
Start: 2020-07-08 | End: 2020-07-08

## 2020-07-08 RX ORDER — POLYETHYLENE GLYCOL 3350 17 G/17G
17 POWDER, FOR SOLUTION ORAL DAILY PRN
Status: DISCONTINUED | OUTPATIENT
Start: 2020-07-08 | End: 2020-07-13

## 2020-07-08 RX ORDER — TRIAMTERENE AND HYDROCHLOROTHIAZIDE 37.5; 25 MG/1; MG/1
1 TABLET ORAL EVERY MORNING
Status: DISCONTINUED | OUTPATIENT
Start: 2020-07-09 | End: 2020-07-12

## 2020-07-08 RX ORDER — ALLOPURINOL 300 MG/1
300 TABLET ORAL DAILY
Status: DISCONTINUED | OUTPATIENT
Start: 2020-07-09 | End: 2020-07-19 | Stop reason: HOSPADM

## 2020-07-08 RX ORDER — ACETAMINOPHEN 650 MG/1
650 SUPPOSITORY RECTAL EVERY 6 HOURS PRN
Status: DISCONTINUED | OUTPATIENT
Start: 2020-07-08 | End: 2020-07-13

## 2020-07-08 RX ORDER — ATORVASTATIN CALCIUM 40 MG/1
40 TABLET, FILM COATED ORAL DAILY
Status: DISCONTINUED | OUTPATIENT
Start: 2020-07-09 | End: 2020-07-09

## 2020-07-08 RX ORDER — SODIUM CHLORIDE 0.9 % (FLUSH) 0.9 %
10 SYRINGE (ML) INJECTION PRN
Status: DISCONTINUED | OUTPATIENT
Start: 2020-07-08 | End: 2020-07-13

## 2020-07-08 RX ORDER — PROMETHAZINE HYDROCHLORIDE 25 MG/1
12.5 TABLET ORAL EVERY 6 HOURS PRN
Status: DISCONTINUED | OUTPATIENT
Start: 2020-07-08 | End: 2020-07-13

## 2020-07-08 RX ORDER — ASPIRIN 81 MG/1
81 TABLET, CHEWABLE ORAL DAILY
Status: DISCONTINUED | OUTPATIENT
Start: 2020-07-09 | End: 2020-07-13

## 2020-07-08 RX ORDER — ACETAMINOPHEN 325 MG/1
650 TABLET ORAL EVERY 6 HOURS PRN
Status: DISCONTINUED | OUTPATIENT
Start: 2020-07-08 | End: 2020-07-13

## 2020-07-08 RX ORDER — METOPROLOL SUCCINATE 25 MG/1
25 TABLET, EXTENDED RELEASE ORAL 2 TIMES DAILY WITH MEALS
Status: DISCONTINUED | OUTPATIENT
Start: 2020-07-08 | End: 2020-07-10

## 2020-07-08 RX ORDER — ONDANSETRON 2 MG/ML
4 INJECTION INTRAMUSCULAR; INTRAVENOUS EVERY 6 HOURS PRN
Status: DISCONTINUED | OUTPATIENT
Start: 2020-07-08 | End: 2020-07-13

## 2020-07-08 RX ORDER — PRIMIDONE 50 MG/1
50 TABLET ORAL 3 TIMES DAILY
Status: DISCONTINUED | OUTPATIENT
Start: 2020-07-08 | End: 2020-07-13

## 2020-07-08 RX ORDER — LEVOTHYROXINE SODIUM 0.05 MG/1
50 TABLET ORAL DAILY
Status: DISCONTINUED | OUTPATIENT
Start: 2020-07-09 | End: 2020-07-19 | Stop reason: HOSPADM

## 2020-07-08 RX ORDER — CLOPIDOGREL BISULFATE 75 MG/1
75 TABLET ORAL DAILY
Status: DISCONTINUED | OUTPATIENT
Start: 2020-07-09 | End: 2020-07-09

## 2020-07-08 RX ORDER — CHOLECALCIFEROL (VITAMIN D3) 125 MCG
5 CAPSULE ORAL NIGHTLY PRN
Status: DISCONTINUED | OUTPATIENT
Start: 2020-07-08 | End: 2020-07-13

## 2020-07-08 RX ADMIN — MELATONIN TAB 5 MG 5 MG: 5 TAB at 23:07

## 2020-07-08 RX ADMIN — Medication 10 ML: at 22:06

## 2020-07-08 RX ADMIN — ACETAMINOPHEN 650 MG: 325 TABLET, FILM COATED ORAL at 22:11

## 2020-07-08 RX ADMIN — ASPIRIN 325 MG: 325 TABLET, COATED ORAL at 17:18

## 2020-07-08 RX ADMIN — SODIUM CHLORIDE 1000 ML: 9 INJECTION, SOLUTION INTRAVENOUS at 17:17

## 2020-07-08 RX ADMIN — ENOXAPARIN SODIUM 90 MG: 100 INJECTION SUBCUTANEOUS at 17:23

## 2020-07-08 RX ADMIN — METOPROLOL SUCCINATE 25 MG: 25 TABLET, EXTENDED RELEASE ORAL at 20:10

## 2020-07-08 RX ADMIN — NITROGLYCERIN 0.5 INCH: 20 OINTMENT TOPICAL at 17:17

## 2020-07-08 RX ADMIN — PRIMIDONE 50 MG: 50 TABLET ORAL at 22:05

## 2020-07-08 ASSESSMENT — HEART SCORE: ECG: 1

## 2020-07-08 ASSESSMENT — ENCOUNTER SYMPTOMS
ABDOMINAL PAIN: 0
SHORTNESS OF BREATH: 0
COLOR CHANGE: 0
NAUSEA: 1
VOMITING: 0
CHEST TIGHTNESS: 1
BACK PAIN: 0
EYES NEGATIVE: 1

## 2020-07-08 ASSESSMENT — PAIN SCALES - GENERAL
PAINLEVEL_OUTOF10: 4
PAINLEVEL_OUTOF10: 6
PAINLEVEL_OUTOF10: 6
PAINLEVEL_OUTOF10: 5
PAINLEVEL_OUTOF10: 0

## 2020-07-08 ASSESSMENT — PAIN DESCRIPTION - DESCRIPTORS
DESCRIPTORS: HEADACHE
DESCRIPTORS: HEADACHE

## 2020-07-08 ASSESSMENT — PAIN DESCRIPTION - LOCATION
LOCATION: HEAD
LOCATION: HEAD

## 2020-07-08 NOTE — H&P
tablet Take 300 mg by mouth daily    Historical Provider, MD   aspirin 81 MG tablet Take 81 mg by mouth daily    Historical Provider, MD   nitroGLYCERIN (NITROSTAT) 0.4 MG SL tablet Place 1 tablet under the tongue every 5 minutes as needed for Chest pain 7/11/19   Josiah Foss MD       Allergies:  Patient has no known allergies. Social History:      The patient currently lives at home    TOBACCO:   reports that he has never smoked. He has never used smokeless tobacco.  ETOH:   reports previous alcohol use. E-Cigarettes Vaping or Juuling     Questions Responses    Vaping Use     Start Date     Does device contain nicotine? Quit Date     Vaping Type             Family History:      Reviewed in detail and negative for DM, CAD, Cancer, CVA. Positive as follows:    History reviewed. No pertinent family history. REVIEW OF SYSTEMS:   Pertinent positives as noted in the HPI. All other systems reviewed and negative. PHYSICAL EXAM PERFORMED:    /70   Pulse 105   Temp 98.4 °F (36.9 °C) (Oral)   Resp 20   Ht 5' 9\" (1.753 m)   Wt 202 lb (91.6 kg)   SpO2 93%   BMI 29.83 kg/m²     General appearance:  No apparent distress, appears stated age and cooperative. HEENT:  Normal cephalic, atraumatic without obvious deformity. Pupils equal, round, and reactive to light. Extra ocular muscles intact. Conjunctivae/corneas clear. Neck: Supple, with full range of motion. No jugular venous distention. Trachea midline. Respiratory:  Normal respiratory effort. Clear to auscultation, bilaterally without Rales/Wheezes/Rhonchi. Cardiovascular:  Regular rate and rhythm with normal S1/S2 without murmurs, rubs or gallops. Abdomen: Soft, non-tender, non-distended with normal bowel sounds. Musculoskeletal:  No clubbing, cyanosis or edema bilaterally. Full range of motion without deformity. Skin: Skin color, texture, turgor normal.  No rashes or lesions.   Neurologic:  Neurovascularly intact without any focal sensory/motor deficits. Cranial nerves: II-XII intact, grossly non-focal.  Psychiatric:  Alert and oriented, thought content appropriate, normal insight  Capillary Refill: Brisk,< 3 seconds   Peripheral Pulses: +2 palpable, equal bilaterally       Labs:     Recent Labs     07/08/20  1646   WBC 10.7   HGB 15.6   HCT 44.7   *     Recent Labs     07/08/20  1646      K 3.7      CO2 25   BUN 21*   CREATININE 1.0   CALCIUM 9.5     Recent Labs     07/08/20  1646   AST 38*   ALT 42*   BILITOT 0.9   ALKPHOS 69     No results for input(s): INR in the last 72 hours. Recent Labs     07/08/20  1646   TROPONINI 0.03*       Urinalysis:    No results found for: Janee Denzel, BACTERIA, RBCUA, BLOODU, [address], GLUCOSEU    Radiology:     CXR: I have reviewed the CXR with the following interpretation: no acute disease  EKG:  I have reviewed the EKG with the following interpretation: sinus tachycardia, RBBB    XR CHEST PORTABLE   Final Result   Stable portable study. ASSESSMENT:    Active Hospital Problems    Diagnosis Date Noted    Unstable angina ([facility] [address] [I20.0] 07/08/2020         PLAN:  Unstable angina  - EKG negative. Trop mildly elevated at 0.03  - continue to trend troponin  - cardiology consulted  - continue ASA, plavix, lipitor, metoprolol  - started on treatment dose lovenox  - defer further testing to cardiology    HTN  - well controlled  - continue home regimen    HLD  - continue home statin    Hypothyroidism  - continue home synthroid    Gout  - no flare noted  - continue allopurinol    DVT Prophylaxis: lovenox  Diet: Cardiac diet  Code Status: Full code    PT/OT Eval Status: not ordered    Dispo - 1-2 days       Fatou Gonzalez MD    Thank you Ranjan Torres MD for the opportunity to be involved in this patient's care. If you have any questions or concerns please feel free to contact me at [phone].

## 2020-07-08 NOTE — ED NOTES
$100.00 dollars bill and cell phone taken from pt per pt request and given to son Cosme Whitehead in the triage vestibule. Witnessed by Katlin Gutierrez and She WASHINGTON.        Harini Pruitt RN  07/08/20 1879

## 2020-07-08 NOTE — ED NOTES
Ps [facility] @ [phone]  Re: chest pain  Dr Yancy Jones responded @ [phone] [facility]  07/08/20 2341

## 2020-07-08 NOTE — ED NOTES
Spoke with son after permission from pt. Son updated on plan of care for pt.      Reinier Espino RN  07/08/20 6025

## 2020-07-08 NOTE — ED PROVIDER NOTES
[address] [facility]  ED  EMERGENCY DEPARTMENT ENCOUNTER        Pt Name: Armando Perry  MRN: [id number]  Tammygfdolores 9/4/1933  Date of evaluation: 7/8/2020  Provider: Venkata Rivera PA-C  PCP: Catherine Blackman MD  ED Attending: Rasheeda Ward MD      This patient was seen by the attending provider    History provided by the patient    CHIEF COMPLAINT:     Chief Complaint   Patient presents with    Chest Pain     Symptoms for 1 month worsening for the past 2 days. Hx of cardiac stents. Advised by cardiologist to go to the ER. HISTORY OF PRESENT ILLNESS:      Armando Perry is a 80 y.o. male who arrives to the ED by private vehicle. Patient reports he has been experiencing chest pain off and on x1 month. Symptoms seem to be exertional in nature. He called his cardiologist, Dr. Haritha Quiñonez. He scheduled an appointment for 7/13/2020. However he was told if symptoms worsen he should go to the emergency department. Patient reports worsening symptoms today. He was out in his yard picking up small flags along the periphery of the yard that were in place marking a invisible dog fence. He states he had severe chest pressure/pain and his arms bilaterally felt cool. He reports nausea with this as well. He denies shortness of breath or diaphoresis. He rested and took a sublingual nitroglycerin tablet and ultimately, after 30 minutes had resolution of pain. The episode frightened him however and prompted him to come to the ED. Symptoms are exacerbated with activity/exertion and alleviated with rest and today with nitro as well. He reports mild headache on arrival that he attributes to nitro but denies any chest pain. Patient has a significant history of CAD. Most recently in 7/2019 he was cathed and had 3 stents placed. Nursing Notes were reviewed     REVIEW OF SYSTEMS:     Review of Systems   Constitutional: Negative for chills and fever. HENT: Negative. Eyes: Negative. Respiratory: Positive for chest tightness. Negative for shortness of breath. Cardiovascular: Positive for chest pain. Gastrointestinal: Positive for nausea. Negative for abdominal pain and vomiting. Genitourinary: Negative. Musculoskeletal: Negative for back pain and neck pain. Skin: Negative for color change. Neurological: Negative for dizziness and headaches. All other systems reviewed and are negative. Except as noted above in the ROS, all other systems were reviewed and negative. PAST MEDICAL HISTORY:     Past Medical History:   Diagnosis Date    CAD (coronary artery disease)     Hypertension     Kidney stone          SURGICAL HISTORY:      Past Surgical History:   Procedure Laterality Date    CARDIAC CATHETERIZATION  2016    x1  BRET to LAD    JOINT REPLACEMENT Left          CURRENT MEDICATIONS:       Previous Medications    ALLOPURINOL (ZYLOPRIM) 300 MG TABLET    Take 300 mg by mouth daily    ASPIRIN 81 MG TABLET    Take 81 mg by mouth daily    ATORVASTATIN (LIPITOR) 40 MG TABLET    Take 1 tablet by mouth daily    CLOPIDOGREL (PLAVIX) 75 MG TABLET    Take 75 mg by mouth daily    LEVOTHYROXINE (SYNTHROID) 50 MCG TABLET    Take 50 mcg by mouth Daily    METOPROLOL SUCCINATE (TOPROL XL) 25 MG EXTENDED RELEASE TABLET    Take 25 mg by mouth 2 times daily (with meals)    NITROGLYCERIN (NITROSTAT) 0.4 MG SL TABLET    Place 1 tablet under the tongue every 5 minutes as needed for Chest pain    PRIMIDONE (MYSOLINE) 50 MG TABLET    Take 50 mg by mouth 3 times daily    RESPIRATORY THERAPY SUPPLIES (SPIROMETER) KIT    1 Units by Does not apply route daily    TRIAMTERENE-HYDROCHLOROTHIAZIDE (DYAZIDE) 37.5-25 MG PER CAPSULE    Take 1 capsule by mouth every morning         ALLERGIES:    Patient has no known allergies. FAMILY HISTORY:     History reviewed. No pertinent family history.        SOCIAL HISTORY:       Social History     Socioeconomic History    Marital status:      Spouse name: Alexi Gibson Number of children: None    Years of education: None    Highest education level: None   Occupational History    None   Social Needs    Financial resource strain: None    Food insecurity     Worry: None     Inability: None    Transportation needs     Medical: None     Non-medical: None   Tobacco Use    Smoking status: Never Smoker    Smokeless tobacco: Never Used   Substance and Sexual Activity    Alcohol use: Not Currently    Drug use: Never    Sexual activity: Not Currently     Partners: Female   Lifestyle    Physical activity     Days per week: None     Minutes per session: None    Stress: None   Relationships    Social connections     Talks on phone: None     Gets together: None     Attends Shinto service: None     Active member of club or organization: None     Attends meetings of clubs or organizations: None     Relationship status: None    Intimate partner violence     Fear of current or ex partner: None     Emotionally abused: None     Physically abused: None     Forced sexual activity: None   Other Topics Concern    None   Social History Narrative    None       SCREENINGS:    Raysa Coma Scale  Eye Opening: Spontaneous  Best Verbal Response: Oriented  Best Motor Response: Obeys commands  Raysa Coma Scale Score: 15 Heart Score for chest pain patients  History: Highly Suspicious  ECG: Non-Specifc repolarization disturbance/LBTB/PM  Patient Age: > 65 years  *Risk factors for Atherosclerotic disease: Coronary Artery Disease  Risk Factors: > 3 Risk factors or history of atherosclerotic disease*  Troponin: > 1 and < 3X normal limit  Heart Score Total: 8      PHYSICAL EXAM:       ED Triage Vitals [07/08/20 1633]   BP Temp Temp Source Pulse Resp SpO2 Height Weight   113/76 98.4 °F (36.9 °C) Oral 110 20 92 % 5' 9\" (1.753 m) 202 lb (91.6 kg)       Physical Exam    CONSTITUTIONAL: Awake and alert. Cooperative. Well-developed. Well-nourished. Non-toxic. No acute distress. HENT: Normocephalic. Atraumatic.  External ears normal, without discharge. No nasal discharge. Oropharynx clear. Mucous membranes moist.  EYES: Conjunctiva non-injected. No scleral icterus. PERRL. EOM's grossly intact. NECK: Supple. Normal ROM. CARDIOVASCULAR: Mild tachycardia with regular rhythm. No Murmer. Intact distal pulses. PULMONARY/CHEST WALL: Effort normal. No tachypnea. Lungs clear to ausculation. ABDOMEN: Normal BS. Soft. Nondistended. No tenderness to palpate. No guarding. /ANORECTAL: Not assessed  MUSKULOSKELETAL: Normal ROM. No acute deformities. No edema. No tenderness to palpate. SKIN: Warm and dry. No rash. NEUROLOGICAL: Alert and oriented x 3. GCS 15. CN II-XII grossly intact. Strength is 5/5 in all extremities and sensation is intact. Normal gait.    PSYCHIATRIC: Normal affect        DIAGNOSTICRESULTS:     LABS:    Results for orders placed or performed during the hospital encounter of 07/08/20   CBC Auto Differential   Result Value Ref Range    WBC 10.7 4.0 - 11.0 K/uL    RBC 4.93 4.20 - 5.90 M/uL    Hemoglobin 15.6 13.5 - 17.5 g/dL    Hematocrit 44.7 40.5 - 52.5 %    MCV 90.7 80.0 - 100.0 fL    MCH 31.6 26.0 - 34.0 pg    MCHC 34.8 31.0 - 36.0 g/dL    RDW 14.0 12.4 - 15.4 %    Platelets 018 (L) 542 - 450 K/uL    MPV 9.6 5.0 - 10.5 fL    Neutrophils % 67.1 %    Lymphocytes % 11.9 %    Monocytes % 7.1 %    Eosinophils % 13.2 %    Basophils % 0.7 %    Neutrophils Absolute 7.2 1.7 - 7.7 K/uL    Lymphocytes Absolute 1.3 1.0 - 5.1 K/uL    Monocytes Absolute 0.8 0.0 - 1.3 K/uL    Eosinophils Absolute 1.4 (H) 0.0 - 0.6 K/uL    Basophils Absolute 0.1 0.0 - 0.2 K/uL   Comprehensive Metabolic Panel w/ Reflex to MG   Result Value Ref Range    Sodium 138 136 - 145 mmol/L    Potassium reflex Magnesium 3.7 3.5 - 5.1 mmol/L    Chloride 103 99 - 110 mmol/L    CO2 25 21 - 32 mmol/L    Anion Gap 10 3 - 16    Glucose 134 (H) 70 - 99 mg/dL    BUN 21 (H) 7 - 20 mg/dL    CREATININE 1.0 0.8 - 1.3 mg/dL    GFR Non-African American >60 >60    GFR  American >60 >60    Calcium 9.5 8.3 - 10.6 mg/dL    Total Protein 7.7 6.4 - 8.2 g/dL    Alb 4.4 3.4 - 5.0 g/dL    Albumin/Globulin Ratio 1.3 1.1 - 2.2    Total Bilirubin 0.9 0.0 - 1.0 mg/dL    Alkaline Phosphatase 69 40 - 129 U/L    ALT 42 (H) 10 - 40 U/L    AST 38 (H) 15 - 37 U/L    Globulin 3.3 g/dL   Troponin   Result Value Ref Range    Troponin 0.03 (H) <0.01 ng/mL   EKG 12 Lead   Result Value Ref Range    Ventricular Rate 108 BPM    Atrial Rate 108 BPM    P-R Interval 190 ms    QRS Duration 74 ms    Q-T Interval 334 ms    QTc Calculation (Bazett) 447 ms    P Axis 57 degrees    R Axis -4 degrees    T Axis 38 degrees    Diagnosis       Sinus tachycardia with  intermittent rate related RBBBInferior infarct , age undeterminedAbnormal ECGWhen compared with ECG of 26-JUL-2019 07:28,Right bundle branch block is newConfirmed by Funmilayo See MD, Renetta Dumont ([id number]) on 7/8/2020 5:50:32 PM         RADIOLOGY:  All x-ray studies areviewed/reviewed by me. Formal interpretations per the radiologist are as follows:      Xr Chest Portable    Result Date: 7/8/2020  EXAMINATION: ONE XRAY VIEW OF THE CHEST 7/8/2020 4:40 pm COMPARISON: 08/30/2019 HISTORY: ORDERING SYSTEM PROVIDED HISTORY: CP, ASHER TECHNOLOGIST PROVIDED HISTORY: Reason for exam:->CP, SOB Reason for Exam: chest pain Acuity: Acute Type of Exam: Initial FINDINGS: No acute bony abnormality. The heart size and mediastinal contours are within normal limits, stable. The lungs are clear. Stable portable study. EKG: The Ekg interpreted by me in the absence of a cardiologist shows. sinus tachycardia, [id number]     See also interpretation by Batsheva Gary MD.      PROCEDURES:   N/A    CRITICAL CARE TIME:       Due to the immediate potential for life-threatening deterioration due to chest pain/unstable angina, I spent 32 minutes providing critical care. This time is excluding time spent performing procedures.       CONSULTS:  IP CONSULT TO CARDIOLOGY  IP CONSULT TO HOSPITALIST      EMERGENCY DEPARTMENT COURSE and DIFFERENTIAL DIAGNOSIS/MDM:   Vitals:    Vitals:    07/08/20 1633 07/08/20 1726   BP: 113/76 110/70   Pulse: 110 105   Resp: 20 20   Temp: 98.4 °F (36.9 °C)    TempSrc: Oral    SpO2: 92% 93%   Weight: 202 lb (91.6 kg)    Height: 5' 9\" (1.753 m)        Patient was given the following medications:  Medications   0.9 % sodium chloride bolus (1,000 mLs Intravenous New Bag 7/8/20 1717)   aspirin EC tablet 325 mg (325 mg Oral Given 7/8/20 1718)   nitroglycerin (NITRO-BID) 2 % ointment 0.5 inch (0.5 inches Topical Given 7/8/20 1717)   enoxaparin (LOVENOX) injection 90 mg (90 mg Subcutaneous Given 7/8/20 1723)         Patient was evaluated by both myself and Kevin Nieto MD.   Old records were reviewed. Patient arrives with what sounds like unstable angina. He describes exertional chest pain alleviated with rest and today with nitro. He has a history of CAD. EKG shows sinus tachycardia at approximate 110 bpm.  Portable chest x-ray is normal.  CBC is normal.  CMP shows a slight transaminitis with ALT 42 and AST 38. Otherwise normal.  Troponin 0.03. The patient has a calculated heart score of 7-8. Consulted cardiology and spoke with Dr. Lorenzo Haddad.  Based on the patient's history and presentation today he certainly warrants hospitalization. Patient was given aspirin orally on arrival.  He had 1/2 inch of Nitropaste applied to his chest wall. He is given 1 L bolus of normal saline and ultimately Lovenox subcutaneously as recommended by cardiology for what sounds like unstable angina. Again patient has been pain-free throughout his ED stay but will warrant hospitalization. I am consulting the hospitalist.  I spoke with Dr. Elif Lam. We thoroughly discussed the history, physical exam, laboratory and imaging studies, as well as, emergency department course.  Based upon that discussion, we've decided to admit Lorinda Skiff for further observation and evaluation of Royce Bautista's

## 2020-07-09 ENCOUNTER — APPOINTMENT (OUTPATIENT)
Dept: CARDIAC CATH/INVASIVE PROCEDURES | Age: 85
DRG: 234 | End: 2020-07-09
Payer: MEDICARE

## 2020-07-09 PROBLEM — R07.9 CHEST PAIN: Status: ACTIVE | Noted: 2020-07-09

## 2020-07-09 LAB
ANION GAP SERPL CALCULATED.3IONS-SCNC: 10 MMOL/L (ref 3–16)
APTT: 99.3 SEC (ref 24.2–36.2)
BILIRUBIN URINE: NEGATIVE
BLOOD, URINE: NEGATIVE
BUN BLDV-MCNC: 22 MG/DL (ref 7–20)
CALCIUM SERPL-MCNC: 8.6 MG/DL (ref 8.3–10.6)
CHLORIDE BLD-SCNC: 106 MMOL/L (ref 99–110)
CLARITY: CLEAR
CO2: 24 MMOL/L (ref 21–32)
COLOR: YELLOW
CREAT SERPL-MCNC: 0.8 MG/DL (ref 0.8–1.3)
EKG ATRIAL RATE: 71 BPM
EKG DIAGNOSIS: NORMAL
EKG P AXIS: 49 DEGREES
EKG P-R INTERVAL: 214 MS
EKG Q-T INTERVAL: 404 MS
EKG QRS DURATION: 82 MS
EKG QTC CALCULATION (BAZETT): 439 MS
EKG R AXIS: -7 DEGREES
EKG T AXIS: 61 DEGREES
EKG VENTRICULAR RATE: 71 BPM
GFR AFRICAN AMERICAN: >60
GFR NON-AFRICAN AMERICAN: >60
GLUCOSE BLD-MCNC: 110 MG/DL (ref 70–99)
GLUCOSE URINE: NEGATIVE MG/DL
HCT VFR BLD CALC: 38 % (ref 40.5–52.5)
HEMOGLOBIN: 13 G/DL (ref 13.5–17.5)
KETONES, URINE: NEGATIVE MG/DL
LEUKOCYTE ESTERASE, URINE: NEGATIVE
LV EF: 60 %
LVEF MODALITY: NORMAL
MAGNESIUM: 1.7 MG/DL (ref 1.8–2.4)
MCH RBC QN AUTO: 31.1 PG (ref 26–34)
MCHC RBC AUTO-ENTMCNC: 34.3 G/DL (ref 31–36)
MCV RBC AUTO: 90.9 FL (ref 80–100)
MICROSCOPIC EXAMINATION: NORMAL
NITRITE, URINE: NEGATIVE
PDW BLD-RTO: 14 % (ref 12.4–15.4)
PH UA: 7 (ref 5–8)
PLATELET # BLD: 103 K/UL (ref 135–450)
PMV BLD AUTO: 9.9 FL (ref 5–10.5)
POC ACT LR: 320 SEC
POTASSIUM REFLEX MAGNESIUM: 3.3 MMOL/L (ref 3.5–5.1)
PROTEIN UA: NEGATIVE MG/DL
RBC # BLD: 4.18 M/UL (ref 4.2–5.9)
SODIUM BLD-SCNC: 140 MMOL/L (ref 136–145)
SPECIFIC GRAVITY UA: 1.01 (ref 1–1.03)
URINE REFLEX TO CULTURE: NORMAL
URINE TYPE: NORMAL
UROBILINOGEN, URINE: 1 E.U./DL
WBC # BLD: 9 K/UL (ref 4–11)

## 2020-07-09 PROCEDURE — 6370000000 HC RX 637 (ALT 250 FOR IP): Performed by: INTERNAL MEDICINE

## 2020-07-09 PROCEDURE — 81003 URINALYSIS AUTO W/O SCOPE: CPT

## 2020-07-09 PROCEDURE — B2111ZZ FLUOROSCOPY OF MULTIPLE CORONARY ARTERIES USING LOW OSMOLAR CONTRAST: ICD-10-PCS | Performed by: INTERNAL MEDICINE

## 2020-07-09 PROCEDURE — 85347 COAGULATION TIME ACTIVATED: CPT

## 2020-07-09 PROCEDURE — 85730 THROMBOPLASTIN TIME PARTIAL: CPT

## 2020-07-09 PROCEDURE — 93010 ELECTROCARDIOGRAM REPORT: CPT | Performed by: INTERNAL MEDICINE

## 2020-07-09 PROCEDURE — 80048 BASIC METABOLIC PNL TOTAL CA: CPT

## 2020-07-09 PROCEDURE — 99223 1ST HOSP IP/OBS HIGH 75: CPT | Performed by: INTERNAL MEDICINE

## 2020-07-09 PROCEDURE — 6360000002 HC RX W HCPCS: Performed by: INTERNAL MEDICINE

## 2020-07-09 PROCEDURE — 6370000000 HC RX 637 (ALT 250 FOR IP): Performed by: NURSE PRACTITIONER

## 2020-07-09 PROCEDURE — 6360000004 HC RX CONTRAST MEDICATION

## 2020-07-09 PROCEDURE — 2580000003 HC RX 258: Performed by: INTERNAL MEDICINE

## 2020-07-09 PROCEDURE — 93005 ELECTROCARDIOGRAM TRACING: CPT | Performed by: INTERNAL MEDICINE

## 2020-07-09 PROCEDURE — 6360000002 HC RX W HCPCS

## 2020-07-09 PROCEDURE — 4A023N7 MEASUREMENT OF CARDIAC SAMPLING AND PRESSURE, LEFT HEART, PERCUTANEOUS APPROACH: ICD-10-PCS | Performed by: INTERNAL MEDICINE

## 2020-07-09 PROCEDURE — 85027 COMPLETE CBC AUTOMATED: CPT

## 2020-07-09 PROCEDURE — B2151ZZ FLUOROSCOPY OF LEFT HEART USING LOW OSMOLAR CONTRAST: ICD-10-PCS | Performed by: INTERNAL MEDICINE

## 2020-07-09 PROCEDURE — 2580000003 HC RX 258

## 2020-07-09 PROCEDURE — 2709999900 HC NON-CHARGEABLE SUPPLY

## 2020-07-09 PROCEDURE — C1769 GUIDE WIRE: HCPCS

## 2020-07-09 PROCEDURE — C1894 INTRO/SHEATH, NON-LASER: HCPCS

## 2020-07-09 PROCEDURE — 2500000003 HC RX 250 WO HCPCS

## 2020-07-09 PROCEDURE — 93458 L HRT ARTERY/VENTRICLE ANGIO: CPT

## 2020-07-09 PROCEDURE — 83735 ASSAY OF MAGNESIUM: CPT

## 2020-07-09 PROCEDURE — 36415 COLL VENOUS BLD VENIPUNCTURE: CPT

## 2020-07-09 PROCEDURE — 2060000000 HC ICU INTERMEDIATE R&B

## 2020-07-09 RX ORDER — POTASSIUM CHLORIDE 7.45 MG/ML
10 INJECTION INTRAVENOUS PRN
Status: DISCONTINUED | OUTPATIENT
Start: 2020-07-09 | End: 2020-07-13

## 2020-07-09 RX ORDER — MAGNESIUM SULFATE 1 G/100ML
1 INJECTION INTRAVENOUS PRN
Status: DISCONTINUED | OUTPATIENT
Start: 2020-07-09 | End: 2020-07-13

## 2020-07-09 RX ORDER — MIDAZOLAM HYDROCHLORIDE 1 MG/ML
INJECTION INTRAMUSCULAR; INTRAVENOUS
Status: COMPLETED | OUTPATIENT
Start: 2020-07-09 | End: 2020-07-09

## 2020-07-09 RX ORDER — SODIUM CHLORIDE 0.9 % (FLUSH) 0.9 %
10 SYRINGE (ML) INJECTION EVERY 12 HOURS SCHEDULED
Status: DISCONTINUED | OUTPATIENT
Start: 2020-07-09 | End: 2020-07-09 | Stop reason: SDUPTHER

## 2020-07-09 RX ORDER — SODIUM CHLORIDE 0.9 % (FLUSH) 0.9 %
10 SYRINGE (ML) INJECTION PRN
Status: DISCONTINUED | OUTPATIENT
Start: 2020-07-09 | End: 2020-07-13

## 2020-07-09 RX ORDER — POTASSIUM CHLORIDE 20 MEQ/1
40 TABLET, EXTENDED RELEASE ORAL PRN
Status: DISCONTINUED | OUTPATIENT
Start: 2020-07-09 | End: 2020-07-13

## 2020-07-09 RX ORDER — FENTANYL CITRATE 50 UG/ML
INJECTION, SOLUTION INTRAMUSCULAR; INTRAVENOUS
Status: COMPLETED | OUTPATIENT
Start: 2020-07-09 | End: 2020-07-09

## 2020-07-09 RX ORDER — HEPARIN SODIUM 1000 [USP'U]/ML
INJECTION, SOLUTION INTRAVENOUS; SUBCUTANEOUS
Status: COMPLETED | OUTPATIENT
Start: 2020-07-09 | End: 2020-07-09

## 2020-07-09 RX ORDER — HEPARIN SODIUM 1000 [USP'U]/ML
2000 INJECTION, SOLUTION INTRAVENOUS; SUBCUTANEOUS PRN
Status: DISCONTINUED | OUTPATIENT
Start: 2020-07-09 | End: 2020-07-10

## 2020-07-09 RX ORDER — SODIUM CHLORIDE 0.9 % (FLUSH) 0.9 %
10 SYRINGE (ML) INJECTION EVERY 12 HOURS SCHEDULED
Status: DISCONTINUED | OUTPATIENT
Start: 2020-07-09 | End: 2020-07-13

## 2020-07-09 RX ORDER — HEPARIN SODIUM 1000 [USP'U]/ML
4000 INJECTION, SOLUTION INTRAVENOUS; SUBCUTANEOUS PRN
Status: DISCONTINUED | OUTPATIENT
Start: 2020-07-09 | End: 2020-07-10

## 2020-07-09 RX ORDER — ATORVASTATIN CALCIUM 80 MG/1
80 TABLET, FILM COATED ORAL DAILY
Status: DISCONTINUED | OUTPATIENT
Start: 2020-07-10 | End: 2020-07-13

## 2020-07-09 RX ORDER — HEPARIN SODIUM 1000 [USP'U]/ML
60 INJECTION, SOLUTION INTRAVENOUS; SUBCUTANEOUS ONCE
Status: DISCONTINUED | OUTPATIENT
Start: 2020-07-09 | End: 2020-07-09 | Stop reason: SDUPTHER

## 2020-07-09 RX ORDER — HEPARIN SODIUM 10000 [USP'U]/100ML
12 INJECTION, SOLUTION INTRAVENOUS CONTINUOUS
Status: DISCONTINUED | OUTPATIENT
Start: 2020-07-09 | End: 2020-07-09

## 2020-07-09 RX ORDER — ACETAMINOPHEN 325 MG/1
650 TABLET ORAL EVERY 4 HOURS PRN
Status: DISCONTINUED | OUTPATIENT
Start: 2020-07-09 | End: 2020-07-13

## 2020-07-09 RX ORDER — HEPARIN SODIUM 10000 [USP'U]/100ML
7.7 INJECTION, SOLUTION INTRAVENOUS CONTINUOUS
Status: ACTIVE | OUTPATIENT
Start: 2020-07-09 | End: 2020-07-10

## 2020-07-09 RX ORDER — SODIUM CHLORIDE 9 MG/ML
INJECTION, SOLUTION INTRAVENOUS CONTINUOUS
Status: DISCONTINUED | OUTPATIENT
Start: 2020-07-09 | End: 2020-07-13

## 2020-07-09 RX ORDER — SODIUM CHLORIDE 0.9 % (FLUSH) 0.9 %
10 SYRINGE (ML) INJECTION PRN
Status: DISCONTINUED | OUTPATIENT
Start: 2020-07-09 | End: 2020-07-09 | Stop reason: SDUPTHER

## 2020-07-09 RX ADMIN — ATORVASTATIN CALCIUM 40 MG: 40 TABLET, FILM COATED ORAL at 12:56

## 2020-07-09 RX ADMIN — POTASSIUM BICARBONATE 40 MEQ: 782 TABLET, EFFERVESCENT ORAL at 15:32

## 2020-07-09 RX ADMIN — PRIMIDONE 50 MG: 50 TABLET ORAL at 15:32

## 2020-07-09 RX ADMIN — CLOPIDOGREL BISULFATE 75 MG: 75 TABLET ORAL at 09:44

## 2020-07-09 RX ADMIN — MELATONIN TAB 5 MG 5 MG: 5 TAB at 23:13

## 2020-07-09 RX ADMIN — HEPARIN SODIUM 5000 UNITS: 1000 INJECTION, SOLUTION INTRAVENOUS; SUBCUTANEOUS at 11:40

## 2020-07-09 RX ADMIN — SODIUM CHLORIDE: 9 INJECTION, SOLUTION INTRAVENOUS at 11:53

## 2020-07-09 RX ADMIN — HEPARIN SODIUM 12 UNITS/KG/HR: 10000 INJECTION, SOLUTION INTRAVENOUS at 11:51

## 2020-07-09 RX ADMIN — METOPROLOL SUCCINATE 25 MG: 25 TABLET, EXTENDED RELEASE ORAL at 12:56

## 2020-07-09 RX ADMIN — ALLOPURINOL 300 MG: 300 TABLET ORAL at 12:55

## 2020-07-09 RX ADMIN — FENTANYL CITRATE 50 MCG: 50 INJECTION, SOLUTION INTRAMUSCULAR; INTRAVENOUS at 11:37

## 2020-07-09 RX ADMIN — ACETAMINOPHEN 650 MG: 325 TABLET, FILM COATED ORAL at 12:56

## 2020-07-09 RX ADMIN — HEPARIN SODIUM AND DEXTROSE 7.7 ML/HR: 10000; 5 INJECTION INTRAVENOUS at 20:01

## 2020-07-09 RX ADMIN — PRIMIDONE 50 MG: 50 TABLET ORAL at 21:24

## 2020-07-09 RX ADMIN — TRIAMTERENE AND HYDROCHLOROTHIAZIDE 1 TABLET: 37.5; 25 TABLET ORAL at 12:56

## 2020-07-09 RX ADMIN — LEVOTHYROXINE SODIUM 50 MCG: 0.05 TABLET ORAL at 05:48

## 2020-07-09 RX ADMIN — ASPIRIN 81 MG 81 MG: 81 TABLET ORAL at 09:44

## 2020-07-09 RX ADMIN — MIDAZOLAM HYDROCHLORIDE 2 MG: 1 INJECTION INTRAMUSCULAR; INTRAVENOUS at 11:37

## 2020-07-09 ASSESSMENT — PAIN SCALES - GENERAL
PAINLEVEL_OUTOF10: 0
PAINLEVEL_OUTOF10: 0
PAINLEVEL_OUTOF10: 8
PAINLEVEL_OUTOF10: 0

## 2020-07-09 NOTE — PROGRESS NOTES
4 Eyes Skin Assessment     The patient is being assess for  Admission    I agree that 2 RN's have performed a thorough Head to Toe Skin Assessment on the patient. ALL assessment sites listed below have been assessed. Areas assessed by both nurses: Yes  [x]   Head, Face, and Ears   [x]   Shoulders, Back, and Chest  [x]   Arms, Elbows, and Hands   [x]   Coccyx, Sacrum, and IschIum  [x]   Legs, Feet, and Heels        Does the Patient have Skin Breakdown?   No         Brady Prevention initiated:  NA   Wound Care Orders initiated:  NA      WOC nurse consulted for Pressure Injury (Stage 3,4, Unstageable, DTI, NWPT, and Complex wounds), New and Established Ostomies:  NA      Nurse 1 eSignature: Electronically signed by Genie Sigala RN on 7/9/20 at 6:22 AM EDT    **SHARE this note so that the co-signing nurse is able to place an eSignature**    Nurse 2 eSignature: Electronically signed by Marlene Viramontes RN on 7/9/20 at 6:25 AM EDT

## 2020-07-09 NOTE — PROGRESS NOTES
Small hematoma under radial band when removed. Pressure held for 10 minutes, hematoma mashed out. Pressure dressing applied.

## 2020-07-09 NOTE — PROGRESS NOTES
Hospitalist Progress Note      PCP: Alex Paz MD    Date of Admission: 7/8/2020    Chief Complaint: chest pain    Hospital Course: 80 Y M with a known h/o CAD presented with chest pain. LHC showed severe multivessel disease, including the LM, and CT surgery was consulted for CABG planning. Subjective:  He feels well. Resigned to having surgery. He has significant systolic murmur as well, awaiting TTE, looked OK last year. Medications:  Reviewed    Infusion Medications    sodium chloride 75 mL/hr at 07/09/20 1153    heparin (porcine) 12 Units/kg/hr (07/09/20 1151)     Scheduled Medications    heparin (porcine)  60 Units/kg Intravenous Once    sodium chloride flush  10 mL Intravenous 2 times per day    [START ON 7/10/2020] atorvastatin  80 mg Oral Daily    allopurinol  300 mg Oral Daily    aspirin  81 mg Oral Daily    levothyroxine  50 mcg Oral Daily    metoprolol succinate  25 mg Oral BID WC    primidone  50 mg Oral TID    triamterene-hydroCHLOROthiazide  1 tablet Oral QAM    sodium chloride flush  10 mL Intravenous 2 times per day     PRN Meds: perflutren lipid microspheres, heparin (porcine), heparin (porcine), sodium chloride flush, acetaminophen, magnesium sulfate, potassium chloride **OR** potassium alternative oral replacement **OR** potassium chloride, sodium chloride flush, acetaminophen **OR** acetaminophen, polyethylene glycol, promethazine **OR** ondansetron, melatonin      Intake/Output Summary (Last 24 hours) at 7/9/2020 1408  Last data filed at 7/8/2020 1844  Gross per 24 hour   Intake 1000 ml   Output --   Net 1000 ml       Physical Exam Performed:    /71   Pulse 72   Temp 98.4 °F (36.9 °C) (Oral)   Resp 18   Ht 5' 9\" (1.753 m)   Wt 198 lb 1.6 oz (89.9 kg)   SpO2 94%   BMI 29.25 kg/m²     General appearance: No apparent distress, appears stated age and cooperative. HEENT: Pupils equal, round, and reactive to light.  Conjunctivae/corneas clear.  Neck: Supple, with full range of motion. No jugular venous distention. Trachea midline. Respiratory:  Normal respiratory effort. Clear to auscultation, bilaterally without Rales/Wheezes/Rhonchi. Cardiovascular: Regular rate and rhythm with normal S1/S2 without rubs or gallops. 3/6 systolic murmur at the LSB. Abdomen: Soft, non-tender, non-distended with normal bowel sounds. Musculoskeletal: No clubbing, cyanosis or edema bilaterally. Full range of motion without deformity. Skin: Skin color, texture, turgor normal.  No rashes or lesions. Neurologic:  Neurovascularly intact without any focal sensory/motor deficits. Cranial nerves: II-XII intact, grossly non-focal.  Psychiatric: Alert and oriented, thought content appropriate, normal insight  Capillary Refill: Brisk,< 3 seconds   Peripheral Pulses: +2 palpable, equal bilaterally       Labs:   Recent Labs     07/08/20  1646 07/09/20  0737   WBC 10.7 9.0   HGB 15.6 13.0*   HCT 44.7 38.0*   * 103*     Recent Labs     07/08/20  1646 07/09/20  0737    140   K 3.7 3.3*    106   CO2 25 24   BUN 21* 22*   CREATININE 1.0 0.8   CALCIUM 9.5 8.6     Recent Labs     07/08/20  1646   AST 38*   ALT 42*   BILITOT 0.9   ALKPHOS 69     No results for input(s): INR in the last 72 hours. Recent Labs     07/08/20  1646 07/08/20  1924 07/08/20  2139   TROPONINI 0.03* 0.05* 0.04*       Urinalysis:    No results found for: Janee Denzel, BACTERIA, RBCUA, BLOODU, SPECGRAV, GLUCOSEU    Radiology:  XR CHEST PORTABLE   Final Result   Stable portable study. VL DUP CAROTID BILATERAL    (Results Pending)   VL PRE OP VEIN MAPPING    (Results Pending)           Assessment/Plan:    Active Hospital Problems    Diagnosis    Chest pain [R07.9]    Unstable angina ([address] [I20.0]       80 Y M with a known h/o CAD presented with chest pain. LHC showed severe multivessel disease, including the LM, and CT surgery was consulted for CABG planning.       Unstable angina, with severe disease of the LM, LAD and diagonals, LCx and OM  - Patient is in unusually good shape for his age and comorbidities. Plan is for CABG early next week after clopidogrel washout.  - aspirin, statin, metoprolol    Significant systolic murmur  - f/u TTE. Nothing severe on TTE a year ago. HTN  - HCTZ+triamterene (hold morning of surgery), metoprolol     HLD  - continue home statin     Hypothyroidism  - continue home synthroid     Gout  - no flare noted  - continue allopurinol      DVT Prophylaxis: on heparin gtt for now  Diet: DIET CARDIAC;  Code Status: Full Code    PT/OT Eval Status: order post-op    Dispo - perhaps 7/18 or later, pending post-op course. He lives at home with his wife who has dementia.       Heidi Roy MD

## 2020-07-09 NOTE — PLAN OF CARE
Problem: Pain:  Goal: Pain level will decrease  Description: Pain level will decrease  Outcome: Ongoing   Patient alert and oriented, rates pain appropriately using 0-10 pain scale. Patient calls out as needed for pain intervention. Will continue to monitor and administer intervention as ordered and requested.

## 2020-07-09 NOTE — CONSULTS
Low dose heparin [facility]    CTS team consulted for CABG  Wt: 89.9kg, will use ABW of 78.4kg for dosing   PLT= 103  - Drip was started at 10.8 ml/hr ~11:51, will continue at the same rate since apTT is due soon. - 5.000  bolus was given in Cath lab  - will order aPTT at 6 hrs  Obdulia Vee/Sweta. 7/9/20 4:50 PM EDT    Aptt = 99.3sec at 1732. Hold heparin drip x 1 hour then restart at 7.7ml/hr. Next aptt at [facility] 7/10  [address] [place] [address]  7/9/2020 at 6:35 PM    7/10 0121  aptt = 56.4 sec. Continue current rate.   Next aptt 0700  [employer], Pharm D.7/10/2020 2:51 AM

## 2020-07-09 NOTE — CONSULTS
[facility]  [phone]      Attending Physician: Bobby Gastelum MD  Reason for Consultation/Chief Complaint: Chest pain    Subjective   History of Present Illness:  Abel Conley is a 80 y.o. patient who presented to the hospital with complaints of chest pain over the last 1 to 2 weeks. Says symptoms got worse, no shortness of breath. Presented to emergency room yesterday and was admitted to the hospital where troponin levels were found to be elevated at 0.04 and 0.05. Patient feels better today and currently has no chest pain. He has a cardiac history which dates back to December 2016 when he was admitted to [address] in [facility] with unstable angina and had an abnormal stress test.  He had a catheterization at [facility] with unsuccessful PCI of LAD as a balloon would not cross the lesion and he was referred to [facility] and had LAD PCI with 2 drug-eluting stents after orbital atherectomy was performed at that time. He did well until July 2019 when he was in [place] and started developing chest pain earlier that year. He came to our attention and had a cardiac catheterization in July 2019 which showed severe LAD disease and he was referred for consideration of CABG versus high risk PCI of the left main and LAD. He elected to pursue high risk PCI and in July 2019 he had PCI of the left main and LAD with 3 drug-eluting stent with rotational atherectomy with Rotablator. He did well thereafter until recently. Chronically, he has hypertension and hypercholesterolemia as well and these have been controlled on medications. He does have stress in his life as his wife has dementia and he cares for her. He does travel back and forth to [place] where he has a second home. Past Medical History:   has a past medical history of CAD (coronary artery disease), Hypertension, Kidney stone, and Thyroid disease.     Surgical History:   has a past surgical history that includes joint replacement (Left) and Cardiac catheterization (2016). Social History:   reports that he has never smoked. He has never used smokeless tobacco. He reports previous alcohol use. He reports that he does not use drugs. Home Medications:  Were reviewed and are listed in nursing record and/or below  Prior to Admission medications    Medication Sig Start Date End Date Taking?  Authorizing Provider   atorvastatin (LIPITOR) 40 MG tablet Take 1 tablet by mouth daily 10/9/19  Yes Denny Silveira MD   Respiratory Therapy Supplies (SPIROMETER) KIT 1 Units by Does not apply route daily 8/12/19  Yes Denny Silveira MD   metoprolol succinate (TOPROL XL) 25 MG extended release tablet Take 25 mg by mouth 2 times daily (with meals)   Yes Historical Provider, MD   clopidogrel (PLAVIX) 75 MG tablet Take 75 mg by mouth daily   Yes Historical Provider, MD   triamterene-hydrochlorothiazide (DYAZIDE) 37.5-25 MG per capsule Take 1 capsule by mouth every morning   Yes Historical Provider, MD   levothyroxine (SYNTHROID) 50 MCG tablet Take 50 mcg by mouth Daily   Yes Historical Provider, MD   allopurinol (ZYLOPRIM) 300 MG tablet Take 300 mg by mouth daily   Yes Historical Provider, MD   aspirin 81 MG tablet Take 81 mg by mouth daily   Yes Historical Provider, MD   nitroGLYCERIN (NITROSTAT) 0.4 MG SL tablet Place 1 tablet under the tongue every 5 minutes as needed for Chest pain 7/11/19  Yes Denny Silveira MD        CURRENT Medications:  0.9 % sodium chloride infusion, Continuous  allopurinol (ZYLOPRIM) tablet 300 mg, Daily  aspirin chewable tablet 81 mg, Daily  atorvastatin (LIPITOR) tablet 40 mg, Daily  clopidogrel (PLAVIX) tablet 75 mg, Daily  levothyroxine (SYNTHROID) tablet 50 mcg, Daily  metoprolol succinate (TOPROL XL) extended release tablet 25 mg, BID WC  primidone (MYSOLINE) tablet 50 mg, TID  triamterene-hydroCHLOROthiazide (MAXZIDE-25) 37.5-25 MG per tablet 1 tablet, QAM  sodium chloride flush 0.9 % injection 10 mL, 2 times per day  sodium chloride flush 0.9 % injection 10 mL, PRN  acetaminophen (TYLENOL) tablet 650 mg, Q6H PRN    Or  acetaminophen (TYLENOL) suppository 650 mg, Q6H PRN  polyethylene glycol (GLYCOLAX) packet 17 g, Daily PRN  promethazine (PHENERGAN) tablet 12.5 mg, Q6H PRN    Or  ondansetron (ZOFRAN) injection 4 mg, Q6H PRN  enoxaparin (LOVENOX) injection 90 mg, BID  melatonin tablet 5 mg, Nightly PRN        Allergies:  Patient has no known allergies. Review of Systems:   A 14 point review of symptoms completed. Pertinent positives identified in the HPI, all other review of symptoms negative as below.       Objective   PHYSICAL EXAM:    Vitals:    07/09/20 0530   BP: (!) 94/57   Pulse: 73   Resp: 18   Temp: 98.4 °F (36.9 °C)   SpO2: 95%    Weight: 198 lb 1.6 oz (89.9 kg)         General Appearance:  Alert, cooperative, no distress, appears stated age   Head:  Normocephalic, without obvious abnormality, atraumatic   Eyes:  PERRL, conjunctiva/corneas clear   Nose: Nares normal, no drainage or sinus tenderness   Throat: Lips, mucosa, and tongue normal   Neck: Supple, symmetrical, trachea midline, no adenopathy, thyroid: not enlarged, symmetric, no tenderness/mass/nodules, no carotid bruit or JVD   Lungs:   Clear to auscultation bilaterally, respirations unlabored   Chest Wall:  No deformity or tenderness   Heart:  Regular rate and rhythm, S1, S2 normal, 2 out of 6 stock murmur   Abdomen:   Soft, non-tender, bowel sounds active all four quadrants,  no masses, no organomegaly   Extremities: Extremities normal, atraumatic, no cyanosis or edema   Pulses: 2+ and symmetric   Skin: Skin color, texture, turgor normal, no rashes or lesions   Pysch: Normal mood and affect   Neurologic: Normal gross motor and sensory exam.         Labs   CBC:   Lab Results   Component Value Date    WBC 9.0 07/09/2020    RBC 4.18 07/09/2020    HGB 13.0 07/09/2020    HCT 38.0 07/09/2020    MCV 90.9 2020    RDW 14.0 2020     2020     CMP:  Lab Results   Component Value Date     2020    K 3.3 2020     2020    CO2 24 2020    BUN 22 2020    CREATININE 0.8 2020    GFRAA >60 2020    AGRATIO 1.3 2020    LABGLOM >60 2020    GLUCOSE 110 2020    PROT 7.7 2020    PROT 8.1 10/11/2019    CALCIUM 8.6 2020    BILITOT 0.9 2020    ALKPHOS 69 2020    AST 38 2020    ALT 42 2020     PT/INR:  No results found for: PTINR  HgBA1c:No results found for: LABA1C  Lab Results   Component Value Date    TROPONINI 0.04 (H) 2020         Cardiac Data     Last EKG:    Normal sinus rhythm, possible inferior infarct, nonspecific ST/T changes, these changes are new as compared to last EKG from 2019    Echo:    2019:        Conclusions      Summary   Normal left ventricular systolic function with ejection fraction of 55-60%. No regional wall motion abnormalites are seen. Grade I diastolic dysfunction with normal filling pressure. Mild aortic   stenosis. Mild aortic regurgitation. Systolic pulmonic artery pressure (SPAP) is normal estimated at 23 mmHg   (Right atrial pressure of 3 mmHg). Stress Test:    Cath:    2019:    CARDIAC CATHETERIZATION REPORT      Procedure Date:  2019  Patient Name: Lexi Elizondo  MRN: [id number]      : 1933        INDICATION      Class 4 angina     PROCEDURES PERFORMED      Left heart catheterization  LVgram  Coronary angiogam  Coronary cath  LIMA angiogram              PROCEDURE DESCRIPTION   Risks/benefits/alternatives/outcomes were discussed with patient and/or family and informed consent was obtained. Using the [facility] test, the patient's right radial artery was found to be acceptable for cannulation. Patient was prepped draped in the usual sterile fashion. Local anaesthetic was applied over puncture site.   Using a back wall technique, a 6 continuous     Rotational atherectomy (rotablator) of LAD  High risk PCI of LM/LAD with 3 drug eluting stents             PROCEDURE DESCRIPTION   Risks/benefits/alternatives/outcomes were discussed with patient and/or family and informed consent was obtained. Patient was prepped draped in the usual sterile fashion. Local anaesthetic was applied over puncture sites. Using seldinger technique, a 7 [place] sheath was inserted into the right femoral artery, a 6fr sheath was inserted into rt CFV, 7fr sheath into left cfa. Percloses were deployed on left side using perclose technique. Temp TV pacer was inserted/removed through the venous line. The left cfa sheath was exchanged for impella sheath. LV was crossed with pigtail through impella sheath and then an 0.018\" wire was inserted into LV and impella CP device was inserted over 0.018\" wire. Wire was removed and impella was positioned and good CO was noted. Attention turned to PCI as noted below. At end of case, Diannah Golds was weaned and removed. Impella sheath was then removed using dry close technique with 14mm balloon taken up and over from right side. Percloses were deployed and hemostasis was obtained with manual pressure as well. Temp tv pacer was removed and rt sided sheaths were removed. There were no immediate complications.            FINDINGS               Left heart cath     LVEDP 14            CORONARY ARTERIES        See diagnostic cath from 7/16/19 for findings.           PERCUTANEOUS INTERVENTION DESCRIPTION      Bivalirudin was used for anticoagulation. A 7fr XB 3.5 guide was used to intubate the LM. A choice PT wire was used to cross the LAD lesion. The choice PT wire was then exchanged over a [employer]  otw balloon for a rota floppy wire. Then rotational atherectomy was performed with 1.5mm cristina. This was removed and lesions in ostium and prox/mid vessel were dilated with 2.5mm compliant balloon.   Then a 7fr guidezilla was

## 2020-07-09 NOTE — PROGRESS NOTES
Cath Lab was seen discussed with cardiology plan for coronary artery bypass x2 once Plavix is out of system early next week full consult to follow

## 2020-07-09 NOTE — PROCEDURES
CARDIAC CATHETERIZATION REPORT     Procedure Date:  2020  Patient Name: Gera Gonzalez  MRN: [id number] : 1933      INDICATION     Unstable angina    PROCEDURES PERFORMED     Left heart catheterization  LVgram  Coronary angiogam  Coronary cath            PROCEDURE DESCRIPTION   Risks/benefits/alternatives/outcomes were discussed with patient and/or family and informed consent was obtained. Using the [facility] scale, the patient's right radial artery was found to be a level B. Patient was prepped draped in the usual sterile fashion. Local anaesthetic was applied over puncture site. Using a back wall technique, a 6 [place] Terumo sheath was inserted into right radial artery. Verapamil, nitroglycerin, nicardipine were administered through the sheath. Heparin was administered. Diagnostic 5 [employer] pigtail and ultra catheters were used for diagnostic angiograms. At the conclusion of the procedure, a TR band was placed over the puncture site and hemostasis was obtained. There were no immediate complications. I supervised sedation with versed 2 mg/fentanyl 50 Mcg during the procedure. 100 cc contrast was utilized. <20cc EBL. FINDINGS       LVGRAM    LVEDP  15   GRADIENT ACROSS AORTIC VALVE  <10mm HG   LV FUNCTION EF 60 %   WALL MOTION  normal   MITRAL REGURGITATION  mild       CORONARY ARTERIES    LM  Proximal-mid 10 to 20% stenosis with distal 90% stenosis that extends into both the LAD and circumflex, calcified vessel. LAD  There is a stent in the ostium of the LAD that extends to the proximal and mid segments, there is 99% ostial/proximal stenosis with mid 60% stenosis and distal 60% stenosis. D1, D2, D3 have diffuse disease with D1 having proximal 60% stenosis and D2 and D3 have ostial 90% stenoses, these are small diagonal branches.        LCX Ostial/proximal 90% stenosis, calcified vessel, proximal 30 to 40% stenosis followed by mid-distal 60 to 70% stenosis that extends into the first obtuse marginal.  There is a bifurcation point distally in this vessel and the lower branch has ostial 70 to 80% stenosis. RCA  Dominant, calcified, ostial/proximal 40 to 50% stenosis. Mid-distal 30% stenosis. Vessel is calcified, PDA and PLV have [id number] 20 to 30% stenoses. CONCLUSIONS:     Severe left main/LAD/circumflex disease  Refer for CABG  Continue heparin drip, hold Plavix in anticipation of surgery, will consider bridging with Integrilin/cangrelor pending removal of TR band.

## 2020-07-09 NOTE — CONSULTS
(with meals)  clopidogrel (PLAVIX) 75 MG tablet, Take 75 mg by mouth daily  triamterene-hydrochlorothiazide (DYAZIDE) 37.5-25 MG per capsule, Take 1 capsule by mouth every morning  levothyroxine (SYNTHROID) 50 MCG tablet, Take 50 mcg by mouth Daily  allopurinol (ZYLOPRIM) 300 MG tablet, Take 300 mg by mouth daily  aspirin 81 MG tablet, Take 81 mg by mouth daily  nitroGLYCERIN (NITROSTAT) 0.4 MG SL tablet, Place 1 tablet under the tongue every 5 minutes as needed for Chest pain  [DISCONTINUED] primidone (MYSOLINE) 50 MG tablet, Take 50 mg by mouth 3 times daily    Allergies:  Patient has no known allergies. Social History:    TOBACCO:   reports that he has never smoked. He has never used smokeless tobacco.  ETOH:   reports previous alcohol use. CAFFEINE ABUSE:  No  DRUGS:   reports no history of drug use. LIFESTYLE: participates in light activity/walking    MARITAL STATUS:    OCCUPATION:  Retired     Family History:    History reviewed. No pertinent family history. REVIEW OF SYSTEMS:      Constitutional:  No night sweats, headaches, weight loss. Eyes:  No glaucoma, cataracts. ENMT:  No nosebleeds, deviated septum. Cardiac:  No arrhythmias. +chest pain. Vascular:  No claudication, varicosities. GI:  No PUD, heartburn. :  Hx kidney stones. No frequent UTIs  Musculoskeletal:  Hx of arthritis, gout. Respiratory:  No SOB, emphysema, asthma. Integumentary:  No dermatitis, itching, rash. Neurological:  No stroke, TIAs, seizures. Psychiatric:  No depression, anxiety. Endocrine: No diabetes. +thyroid issues. Hematologic:  No bleeding, easy bruising. Immunologic:  No known cancer, steroid therapies. PHYSICAL EXAM:    VITALS:  BP (!) 94/57   Pulse 73   Temp 98.4 °F (36.9 °C) (Oral)   Resp 18   Ht 5' 9\" (1.753 m)   Wt 198 lb 1.6 oz (89.9 kg)   SpO2 95%   BMI 29.25 kg/m²     Constitutional:  Well developed and nourished male. No acute distress. Overweight.      Eyes:  lids and lashes normal, pupils equal, round and reactive to light, extra ocular muscles intact, sclera clear, conjunctiva normal    Head/ENT:  Upper and lower dentures. Normal gums, & palate. Moist mucus membranes. No cyanosis or pallor. Neck:  supple, symmetrical, trachea midline, no lymphadenopathy, no jugular venous distension, no carotid bruits and MASSES:  no masses. No thyromegaly. Lungs:  no increased work of breathing, good air exchange, no retractions and clear to auscultation. No tactile fremitus. Cardiovascular:  regular rate and rhythm, S1, S2 normal and +murmur. Apical impulse in 5th intercostal space. Pulses:  Right dorsalis pedis 2, Left dorsalis pedis 2, Right posterior tibial 2, Left Posterior tibial 2, Right radial TR band in place , and Left radial 2. Abdomen:  normal bowel sounds, non-tender. Unable to evaluate aorta due to body habitus. Bruits absent. No hepatosplenomegaly or masses. Musculoskeletal:  Back is straight and non-tender, full ROM of upper and lower extremities. No kyphosis or scoliosis. Extremities:  Warm, pink, no clubbing, cyanosis, petechiae, ischemia, or deformities. No peripheral edema. Skin: no rashes, no ecchymoses, no petechiae, no nodules, no jaundice, no purpura, no wounds    Neurological/Psychiatric: oriented, normal mood, no focal deficits    DATA:  EK2020  Sinus rhythm with 1st degree A-V block. Nonspecific T wave abnormality. Abnormal ECG. When compared with ECG of 2020 16:27,Vent.  rate has decreased BY  37 BPM.    CBC:   Lab Results   Component Value Date    WBC 9.0 2020    RBC 4.18 2020    HGB 13.0 2020    HCT 38.0 2020    MCV 90.9 2020    MCH 31.1 2020    MCHC 34.3 2020    RDW 14.0 2020     2020    MPV 9.9 2020     BMP:    Lab Results   Component Value Date     2020    K 3.3 2020     2020    CO2 24 2020    BUN 22 2020 LABALBU 4.4 07/08/2020    CREATININE 0.8 07/09/2020    CALCIUM 8.6 07/09/2020    GFRAA >60 07/09/2020    LABGLOM >60 07/09/2020    GLUCOSE 110 07/09/2020     Last 3 Troponin:    Lab Results   Component Value Date    TROPONINI 0.04 07/08/2020    TROPONINI 0.05 07/08/2020    TROPONINI 0.03 07/08/2020       CXRAY:  7/8/2020  Impression   Stable portable study. ECHO/ELISEO: 7/17/2019  Findings      Left Ventricle   Normal left ventricular systolic function with ejection fraction of 55-60%. No regional wall motion abnormalites are seen. Normal size left ventricle and wall thickness. Grade I diastolic dysfunction with normal filling pressure. Mitral Valve   Mitral valve is structurally normal.   Trivial mitral regurgitation. Left Atrium   The left atrium is normal in size. Aortic Valve   The aortic valve is thickened/calcified with decreased leaflet mobility   consistent with aortic stenosis. The aortic valve area is calculated at 1.9 cm2 with a maximum pressure   gradient of 20 mmHg and a mean pressure gradient of 10 mmHg. This is c/w   mild aortic stenosis. Mild aortic regurgitation. Aorta   The aortic root is normal in size. Right Ventricle   The right ventricle is normal in size and function. TAPSE is measured at 21 mm. S'prime velocity is measured at 10 cm/s. Tricuspid Valve   Tricuspid valve is structurally normal.   Trivial tricuspid regurgitation. Systolic pulmonic artery pressure (SPAP) is normal estimated at 23 mmHg   (Right atrial pressure of 3 mmHg). Right Atrium   The right atrium is normal in size at 12 cm2. Pulmonic Valve   The pulmonic valve is not well visualized. No evidence of pulmonic valve regurgitation. Pericardial Effusion   No pericardial effusion noted. Pleural Effusion   No pleural effusion. Miscellaneous   IVC size is normal (<2.1cm) and collapses > 50% with respiration consistent   with normal RA pressure (3mmHg).       CT Chest:  12/21/2016  IMPRESSION:  1. The thoracic aorta is normal in caliber. There appear to be calcifications along all 3 cusps of the aortic valve. There is a 3 mm localized area of calcific plaque along the anterior surface of the ascending aorta located approximately 4 cm above the aortic valve. There is additional slightly more extensive atherosclerotic calcified plaque along the anterior surface of the aorta approximately 1 cm more distally. 2.  No adenopathy in the chest.  3.  No pericardial effusion and no pleural effusion. 4.  No evidence of acute pneumonia. There are no substantial underlying emphysematous changes or indication of interstitial lung disease. 5.  Compression fracture of L1 on the superior endplate which is of uncertain acuity but new compared to a previous lumbar spine CT performed in July 2014. Correlation for pain at this level is recommended. LHC:  7/9/2020 with Dr. Parmjit Dalal     LVEDP  15   GRADIENT ACROSS AORTIC VALVE  none   LV FUNCTION EF 60 %   WALL MOTION  normal   MITRAL REGURGITATION  mild         CORONARY ARTERIES     LM  Proximal-mid 10 to 20% stenosis with distal 90% stenosis that extends into both the LAD and circumflex, calcified vessel.         LAD  There is a stent in the ostium of the LAD that extends to the proximal and mid segments, there is 99% ostial/proximal stenosis with mid 60% stenosis and distal 60% stenosis.     D1, D2, D3 have diffuse disease with D1 having proximal 60% stenosis and D2 and D3 have ostial 90% stenoses, these are small diagonal branches.         LCX Ostial/proximal 90% stenosis, calcified vessel, proximal 30 to 40% stenosis followed by mid-distal 60 to 70% stenosis that extends into the first obtuse marginal.  There is a bifurcation point distally in this vessel and the lower branch has ostial 70 to 80% stenosis.       RCA  Dominant, calcified, ostial/proximal 40 to 50% stenosis. Mid-distal 30% stenosis.   Vessel is calcified, PDA and PLV have [id number] 20 to 30% stenoses. [id number] Score for Atrial Fibrillation Stroke Risk   Risk   Factors  Component Value   C CHF No 0   H HTN Yes 1   A2 Age >= 76 Yes,  (80 y.o.) 2   D DM No 0   S2 Prior Stroke/TIA No 0   V Vascular Disease No 0   A Age 74-69 No,  (80 y.o.) 0   Sc Sex male 0    [id number]  Score  3   Score last updated 7/9/20 0:52 PM EDT    Click here for a link to the UpToDate guideline \"Atrial Fibrillation: Anticoagulation therapy to prevent embolization    Disclaimer: Risk Score calculation is dependent on accuracy of patient problem list and past encounter diagnosis. ASSESSMENT AND PLAN:  STS Cardiac Surgery Risk profile: CABG     Mortality:  1.89%  Renal Failure:  1.15%  Permanent Stroke:  1.35%  Prolonged Ventilation:  5.50%  Deep Sternal Infection:  0.17%  Reoperation:  2.86%  Morbidity and Mortality:  9.88%  Short LOS:  35.49%  Long LOS:  3.85%    Pt is a 80year old overweight male with hx of CAD and previous PCI (2016, 2019). His LHC reveals severe LM, LAD and circ disease. Pt is amenable to surgical revascularization. He is on Plavix (last dose 7/9) and will require time for metabolization. Plan for CABG x 2 on Monday July 13th. Pt did have an episode of chest while team at bedside rounding today. Pain was relieved with nitro x 1. EKG without acute changes. Will start cangrelor gtt. Will d/c heparin 2 hours after initiation of cangrelor gtt. Carotid duplex. Vein mapping. PFTs.  ECHO. Cassandra Hernandez CNP   7/9/2020  1523 PM  78-year-old male with progression of his known coronary artery disease. He now has developed severe left main trunk disease involving the ostium of both the LAD and circumflex vessels. He is not a suitable candidate for PCI and I have recommended coronary bypass grafting surgery for him. Surgery has been recommended and discussed with the patient. His wife is at home and suffers from dementia.   Their son is at home caring for his wife. I have discussed the risks, benefits and alternatives with patient, including risks of infection, bleeding, MI, stroke, arrhythmias and an operative mortality risk of 1-2 % as outlined in the STS Cardiac Surgery Risk profile above. I also discussed the the alternative of not doing surgery and the consequences of that action. Questions have been answered and the patient is willing to proceed. I also had a discussion regarding secondary risk modification with him, addressing the need for weight loss through dietary modification and exercise, and will continue to have those conversations with him post op. Surgery scheduled for Monday a.m. We will start a cangrelor drip as outlined above.     Kim Singh MD, FACS, [facility], FACCP, [place]

## 2020-07-09 NOTE — CARE COORDINATION
CASE MANAGEMENT INITIAL ASSESSMENT      Reviewed chart and completed assessment at Bedside with the   Pt. Explained Case Management role/services. Primary contact information: Pt wife opal [phone]    Admit date/status: inpatient 7/8/20  Diagnosis: Unstable angina       Is this a Readmission?:  no    Insurance: Medicare  Precert required for SNF - N        3 night stay required - Y    Living arrangements, Adls, care needs, prior to admission: Pt reported that he lives with his wife in a one level home. Pt stated that he helps to care for his wife. Pt said he is IPTA. Pt son does do their laundry since it is in the Basement. Transportation:     [employer] [place] at home: denied    Services in the home and/or outpatient, prior to admission: none    Dialysis Facility (if applicable) none  · Name:  · Address:  · Dialysis Schedule:  · Phone:  · Fax:    PT/OT recs: none noted    Hospital Exemption Notification (HEN): none noted  Barriers to discharge: none    Plan/comments: Pt  Reported that he plans to go home unless \"Zane calls him home first\". Pt reported that he is open to services only if it is needed.   MARIANNA Washington on chart for MD signature

## 2020-07-09 NOTE — PROGRESS NOTES
Notified Dr. Kiera Patton office, Ruddy Jose, @ [phone] 7/9/20 for cardiothoracic consult. -[phone] [facility]

## 2020-07-10 ENCOUNTER — ANESTHESIA EVENT (OUTPATIENT)
Dept: OPERATING ROOM | Age: 85
DRG: 234 | End: 2020-07-10
Payer: MEDICARE

## 2020-07-10 ENCOUNTER — APPOINTMENT (OUTPATIENT)
Dept: CT IMAGING | Age: 85
DRG: 234 | End: 2020-07-10
Payer: MEDICARE

## 2020-07-10 LAB
ANION GAP SERPL CALCULATED.3IONS-SCNC: 10 MMOL/L (ref 3–16)
APTT: 53.1 SEC (ref 24.2–36.2)
APTT: 56.4 SEC (ref 24.2–36.2)
BUN BLDV-MCNC: 10 MG/DL (ref 7–20)
CALCIUM SERPL-MCNC: 8.7 MG/DL (ref 8.3–10.6)
CHLORIDE BLD-SCNC: 105 MMOL/L (ref 99–110)
CO2: 23 MMOL/L (ref 21–32)
CREAT SERPL-MCNC: 0.7 MG/DL (ref 0.8–1.3)
EKG ATRIAL RATE: 91 BPM
EKG DIAGNOSIS: NORMAL
EKG P AXIS: 41 DEGREES
EKG P-R INTERVAL: 206 MS
EKG Q-T INTERVAL: 360 MS
EKG QRS DURATION: 78 MS
EKG QTC CALCULATION (BAZETT): 442 MS
EKG R AXIS: 4 DEGREES
EKG T AXIS: 42 DEGREES
EKG VENTRICULAR RATE: 91 BPM
GFR AFRICAN AMERICAN: >60
GFR NON-AFRICAN AMERICAN: >60
GLUCOSE BLD-MCNC: 121 MG/DL (ref 70–99)
HCT VFR BLD CALC: 39.2 % (ref 40.5–52.5)
HEMOGLOBIN: 13.7 G/DL (ref 13.5–17.5)
MAGNESIUM: 1.6 MG/DL (ref 1.8–2.4)
MCH RBC QN AUTO: 31.4 PG (ref 26–34)
MCHC RBC AUTO-ENTMCNC: 34.9 G/DL (ref 31–36)
MCV RBC AUTO: 89.9 FL (ref 80–100)
PDW BLD-RTO: 14 % (ref 12.4–15.4)
PLATELET # BLD: 97 K/UL (ref 135–450)
PLATELET SLIDE REVIEW: ABNORMAL
PMV BLD AUTO: 9.7 FL (ref 5–10.5)
POTASSIUM SERPL-SCNC: 3.5 MMOL/L (ref 3.5–5.1)
RBC # BLD: 4.36 M/UL (ref 4.2–5.9)
SLIDE REVIEW: ABNORMAL
SODIUM BLD-SCNC: 138 MMOL/L (ref 136–145)
WBC # BLD: 8.6 K/UL (ref 4–11)

## 2020-07-10 PROCEDURE — 6370000000 HC RX 637 (ALT 250 FOR IP): Performed by: INTERNAL MEDICINE

## 2020-07-10 PROCEDURE — 85730 THROMBOPLASTIN TIME PARTIAL: CPT

## 2020-07-10 PROCEDURE — 99233 SBSQ HOSP IP/OBS HIGH 50: CPT | Performed by: NURSE PRACTITIONER

## 2020-07-10 PROCEDURE — 6360000002 HC RX W HCPCS: Performed by: THORACIC SURGERY (CARDIOTHORACIC VASCULAR SURGERY)

## 2020-07-10 PROCEDURE — 83735 ASSAY OF MAGNESIUM: CPT

## 2020-07-10 PROCEDURE — 6370000000 HC RX 637 (ALT 250 FOR IP): Performed by: THORACIC SURGERY (CARDIOTHORACIC VASCULAR SURGERY)

## 2020-07-10 PROCEDURE — 71250 CT THORAX DX C-: CPT

## 2020-07-10 PROCEDURE — 94729 DIFFUSING CAPACITY: CPT

## 2020-07-10 PROCEDURE — 6370000000 HC RX 637 (ALT 250 FOR IP): Performed by: NURSE PRACTITIONER

## 2020-07-10 PROCEDURE — C9460 INJECTION, CANGRELOR: HCPCS | Performed by: THORACIC SURGERY (CARDIOTHORACIC VASCULAR SURGERY)

## 2020-07-10 PROCEDURE — 80048 BASIC METABOLIC PNL TOTAL CA: CPT

## 2020-07-10 PROCEDURE — 94060 EVALUATION OF WHEEZING: CPT

## 2020-07-10 PROCEDURE — 85027 COMPLETE CBC AUTOMATED: CPT

## 2020-07-10 PROCEDURE — 94760 N-INVAS EAR/PLS OXIMETRY 1: CPT

## 2020-07-10 PROCEDURE — 6360000002 HC RX W HCPCS: Performed by: INTERNAL MEDICINE

## 2020-07-10 PROCEDURE — 93005 ELECTROCARDIOGRAM TRACING: CPT | Performed by: NURSE PRACTITIONER

## 2020-07-10 PROCEDURE — 94726 PLETHYSMOGRAPHY LUNG VOLUMES: CPT

## 2020-07-10 PROCEDURE — 2580000003 HC RX 258: Performed by: THORACIC SURGERY (CARDIOTHORACIC VASCULAR SURGERY)

## 2020-07-10 PROCEDURE — 36415 COLL VENOUS BLD VENIPUNCTURE: CPT

## 2020-07-10 PROCEDURE — 93010 ELECTROCARDIOGRAM REPORT: CPT | Performed by: INTERNAL MEDICINE

## 2020-07-10 PROCEDURE — 2060000000 HC ICU INTERMEDIATE R&B

## 2020-07-10 RX ORDER — NITROGLYCERIN 0.4 MG/1
0.4 TABLET SUBLINGUAL EVERY 5 MIN PRN
Status: DISCONTINUED | OUTPATIENT
Start: 2020-07-10 | End: 2020-07-13

## 2020-07-10 RX ORDER — SODIUM CHLORIDE, SODIUM LACTATE, POTASSIUM CHLORIDE, CALCIUM CHLORIDE 600; 310; 30; 20 MG/100ML; MG/100ML; MG/100ML; MG/100ML
INJECTION, SOLUTION INTRAVENOUS CONTINUOUS
Status: DISCONTINUED | OUTPATIENT
Start: 2020-07-13 | End: 2020-07-13

## 2020-07-10 RX ORDER — ASPIRIN 81 MG/1
81 TABLET ORAL ONCE
Status: COMPLETED | OUTPATIENT
Start: 2020-07-13 | End: 2020-07-13

## 2020-07-10 RX ORDER — NITROGLYCERIN 0.4 MG/1
TABLET SUBLINGUAL
Status: DISPENSED
Start: 2020-07-10 | End: 2020-07-10

## 2020-07-10 RX ORDER — ALBUTEROL SULFATE 90 UG/1
4 AEROSOL, METERED RESPIRATORY (INHALATION) ONCE
Status: COMPLETED | OUTPATIENT
Start: 2020-07-10 | End: 2020-07-10

## 2020-07-10 RX ORDER — METOPROLOL SUCCINATE 50 MG/1
50 TABLET, EXTENDED RELEASE ORAL 2 TIMES DAILY WITH MEALS
Status: DISCONTINUED | OUTPATIENT
Start: 2020-07-10 | End: 2020-07-13

## 2020-07-10 RX ORDER — BISACODYL 10 MG
10 SUPPOSITORY, RECTAL RECTAL ONCE
Status: DISCONTINUED | OUTPATIENT
Start: 2020-07-12 | End: 2020-07-13

## 2020-07-10 RX ORDER — MORPHINE SULFATE 4 MG/ML
4 INJECTION, SOLUTION INTRAMUSCULAR; INTRAVENOUS
Status: DISCONTINUED | OUTPATIENT
Start: 2020-07-10 | End: 2020-07-13

## 2020-07-10 RX ORDER — MORPHINE SULFATE 2 MG/ML
2 INJECTION, SOLUTION INTRAMUSCULAR; INTRAVENOUS
Status: DISCONTINUED | OUTPATIENT
Start: 2020-07-10 | End: 2020-07-13

## 2020-07-10 RX ORDER — LORAZEPAM 0.5 MG/1
0.5 TABLET ORAL EVERY 8 HOURS PRN
Status: DISCONTINUED | OUTPATIENT
Start: 2020-07-10 | End: 2020-07-13

## 2020-07-10 RX ADMIN — MAGNESIUM SULFATE HEPTAHYDRATE 1 G: 1 INJECTION, SOLUTION INTRAVENOUS at 22:35

## 2020-07-10 RX ADMIN — LEVOTHYROXINE SODIUM 50 MCG: 0.05 TABLET ORAL at 06:20

## 2020-07-10 RX ADMIN — PRIMIDONE 50 MG: 50 TABLET ORAL at 20:52

## 2020-07-10 RX ADMIN — ATORVASTATIN CALCIUM 80 MG: 80 TABLET, FILM COATED ORAL at 09:33

## 2020-07-10 RX ADMIN — TRIAMTERENE AND HYDROCHLOROTHIAZIDE 1 TABLET: 37.5; 25 TABLET ORAL at 09:33

## 2020-07-10 RX ADMIN — CANGRELOR 0.75 MCG/KG/MIN: 50 INJECTION, POWDER, LYOPHILIZED, FOR SOLUTION INTRAVENOUS at 23:31

## 2020-07-10 RX ADMIN — ASPIRIN 81 MG 81 MG: 81 TABLET ORAL at 09:33

## 2020-07-10 RX ADMIN — ACETAMINOPHEN 650 MG: 325 TABLET, FILM COATED ORAL at 22:35

## 2020-07-10 RX ADMIN — METOPROLOL SUCCINATE 25 MG: 25 TABLET, EXTENDED RELEASE ORAL at 09:33

## 2020-07-10 RX ADMIN — METOPROLOL SUCCINATE 50 MG: 50 TABLET, EXTENDED RELEASE ORAL at 17:39

## 2020-07-10 RX ADMIN — LORAZEPAM 0.5 MG: 0.5 TABLET ORAL at 17:39

## 2020-07-10 RX ADMIN — PRIMIDONE 50 MG: 50 TABLET ORAL at 09:33

## 2020-07-10 RX ADMIN — ALLOPURINOL 300 MG: 300 TABLET ORAL at 09:33

## 2020-07-10 RX ADMIN — Medication 4 PUFF: at 14:06

## 2020-07-10 RX ADMIN — MAGNESIUM SULFATE HEPTAHYDRATE 1 G: 1 INJECTION, SOLUTION INTRAVENOUS at 20:52

## 2020-07-10 RX ADMIN — MELATONIN TAB 5 MG 5 MG: 5 TAB at 22:35

## 2020-07-10 RX ADMIN — NITROGLYCERIN 0.4 MG: 0.4 TABLET, ORALLY DISINTEGRATING SUBLINGUAL at 09:14

## 2020-07-10 RX ADMIN — CANGRELOR 0.75 MCG/KG/MIN: 50 INJECTION, POWDER, LYOPHILIZED, FOR SOLUTION INTRAVENOUS at 10:09

## 2020-07-10 ASSESSMENT — ENCOUNTER SYMPTOMS
RESPIRATORY NEGATIVE: 1
GASTROINTESTINAL NEGATIVE: 1

## 2020-07-10 ASSESSMENT — PAIN SCALES - GENERAL
PAINLEVEL_OUTOF10: 0
PAINLEVEL_OUTOF10: 3
PAINLEVEL_OUTOF10: 0

## 2020-07-10 NOTE — PROGRESS NOTES
Hospitalist Progress Note      PCP: Da Penn MD    Date of Admission: 7/8/2020    Chief Complaint: chest pain    Hospital Course: 80 Y M with a known h/o CAD presented with chest pain. LHC showed severe multivessel disease, including the LM, and CT surgery was consulted for CABG planning. Subjective:  He feels well. No chest pain or dyspnea. Medications:  Reviewed    Infusion Medications    cangrelor [facility]) infusion      sodium chloride 75 mL/hr at 07/09/20 1153    heparin (porcine) 7.7 mL/hr (07/09/20 2001)     Scheduled Medications    nitroGLYCERIN        sodium chloride flush  10 mL Intravenous 2 times per day    atorvastatin  80 mg Oral Daily    allopurinol  300 mg Oral Daily    aspirin  81 mg Oral Daily    levothyroxine  50 mcg Oral Daily    metoprolol succinate  25 mg Oral BID WC    primidone  50 mg Oral TID    triamterene-hydroCHLOROthiazide  1 tablet Oral QAM    sodium chloride flush  10 mL Intravenous 2 times per day     PRN Meds: nitroGLYCERIN, perflutren lipid microspheres, heparin (porcine), heparin (porcine), sodium chloride flush, acetaminophen, magnesium sulfate, potassium chloride **OR** potassium alternative oral replacement **OR** potassium chloride, sodium chloride flush, acetaminophen **OR** acetaminophen, polyethylene glycol, promethazine **OR** ondansetron, melatonin      Intake/Output Summary (Last 24 hours) at 7/10/2020 0918  Last data filed at 7/10/2020 0848  Gross per 24 hour   Intake 2599 ml   Output 1950 ml   Net 649 ml       Physical Exam Performed:    BP (!) 153/72   Pulse 76   Temp 98.5 °F (36.9 °C) (Oral)   Resp 18   Ht 5' 9\" (1.753 m)   Wt 198 lb 1.6 oz (89.9 kg)   SpO2 93%   BMI 29.25 kg/m²     General appearance: No apparent distress, appears stated age and cooperative. HEENT: Pupils equal, round, and reactive to light. Conjunctivae/corneas clear. Neck: Supple, with full range of motion. No jugular venous distention.  Trachea midline. Respiratory:  Normal respiratory effort. Clear to auscultation, bilaterally without Rales/Wheezes/Rhonchi. Cardiovascular: Regular rate and rhythm with normal S1/S2 without rubs or gallops. 3/6 systolic murmur at the LSB. Abdomen: Soft, non-tender, non-distended with normal bowel sounds. Musculoskeletal: No clubbing, cyanosis or edema bilaterally. Full range of motion without deformity. Skin: Skin color, texture, turgor normal.  No rashes or lesions. Neurologic:  Neurovascularly intact without any focal sensory/motor deficits. Cranial nerves: II-XII intact, grossly non-focal.  Psychiatric: Alert and oriented, thought content appropriate, normal insight  Capillary Refill: Brisk,< 3 seconds   Peripheral Pulses: +2 palpable, equal bilaterally       Labs:   Recent Labs     07/08/20 1646 07/09/20  0737 07/10/20  0702   WBC 10.7 9.0 8.6   HGB 15.6 13.0* 13.7   HCT 44.7 38.0* 39.2*   * 103* 97*     Recent Labs     07/08/20  1646 07/09/20  0737 07/10/20  0702    140 138   K 3.7 3.3* 3.5    106 105   CO2 25 24 23   BUN 21* 22* 10   CREATININE 1.0 0.8 0.7*   CALCIUM 9.5 8.6 8.7     Recent Labs     07/08/20  1646   AST 38*   ALT 42*   BILITOT 0.9   ALKPHOS 69     No results for input(s): INR in the last 72 hours. Recent Labs     07/08/20  1646 07/08/20  1924 07/08/20  2139   TROPONINI 0.03* 0.05* 0.04*       Urinalysis:      Lab Results   Component Value Date    NITRU Negative 07/09/2020    BLOODU Negative 07/09/2020    SPECGRAV 1.010 07/09/2020    GLUCOSEU Negative 07/09/2020       Radiology:  XR CHEST PORTABLE   Final Result   Stable portable study. VL DUP CAROTID BILATERAL    (Results Pending)   VL PRE OP VEIN MAPPING    (Results Pending)           Assessment/Plan:    Active Hospital Problems    Diagnosis    Chest pain [R07.9]    Unstable angina ([address] [I20.0]       80 Y M with a known h/o CAD presented with chest pain.   LHC showed severe multivessel disease, including the LM, and CT surgery was consulted for CABG planning. Unstable angina, with severe disease of the LM, LAD and diagonals, LCx and OM  - Patient is in unusually good shape for his age and comorbidities. Plan is for CABG early next week after clopidogrel washout.  - aspirin, statin, metoprolol    Significant systolic murmur  - TTE was benign. F/u blood pressure differential between his arms. HTN  - HCTZ+triamterene (hold morning of surgery), metoprolol     HLD  - continue home statin     Hypothyroidism  - continue home synthroid     Gout  - no flare noted  - continue allopurinol      DVT Prophylaxis: on heparin gtt for now  Diet: DIET CARDIAC;  Code Status: Full Code    PT/OT Eval Status: order post-op    Dispo - perhaps 7/18 or later, pending post-op course. He lives at home with his wife who has dementia.       Anna Nugent MD

## 2020-07-10 NOTE — PROGRESS NOTES
[facility]  Cardiology  Progress Note    Admission date:  2020    Reason for follow up visit: CAD    HPI/CC: Tonia Pretty is a 80 y.o. male was admitted 2020 for chest pain. Troponin elevated. [facility] 2020 showed severe LM disease, referred to CT surgery. Plan for CABG 2020. Rhythm overnight has been sinus. Subjective: Denies chest pain, shortness of breath, palpitations and dizziness. Small right radial hematoma, pressure held, now soft. Vitals:  Blood pressure 139/75, pulse 79, temperature 98.5 °F (36.9 °C), temperature source Oral, resp. rate 20, height 5' 9\" (1.753 m), weight 198 lb 1.6 oz (89.9 kg), SpO2 97 %.   Temp  Av.4 °F (36.9 °C)  Min: 97.9 °F (36.6 °C)  Max: 98.9 °F (37.2 °C)  Pulse  Av.7  Min: 72  Max: 88  BP  Min: 103/71  Max: 153/72  SpO2  Av %  Min: 93 %  Max: 97 %    24 hour I/O    Intake/Output Summary (Last 24 hours) at 7/10/2020 1148  Last data filed at 7/10/2020 0848  Gross per 24 hour   Intake 2599 ml   Output 1950 ml   Net 649 ml     Current Facility-Administered Medications   Medication Dose Route Frequency Provider Last Rate Last Dose    nitroGLYCERIN (NITROSTAT) 0.4 MG SL tablet             nitroGLYCERIN (NITROSTAT) SL tablet 0.4 mg  0.4 mg Sublingual Q5 Min PRN Darryle Hotter, APRN - CNP   0.4 mg at 07/10/20 0914    cangrelor (KENGREAL) 50 mg in sodium chloride 0.9 % 250 mL infusion  0.75 mcg/kg/min Intravenous Continuous Anna Sultana MD 20.2 mL/hr at 07/10/20 1009 0.75 mcg/kg/min at 07/10/20 1009    morphine (PF) injection 2 mg  2 mg Intravenous Q2H PRN Татьяна Sheehan APRN - CNP        Or    morphine (PF) injection 4 mg  4 mg Intravenous Q2H PRN Darryle Hotter, APRN - CNP       Lopez [START ON 2020] insulin regular (HUMULIN R;NOVOLIN R) 100 Units in sodium chloride 0.9 % 100 mL infusion  0.5 Units/hr Intravenous On Call to [id number] [address] [place] [address] [place], MD        [START ON 2020] norepinephrine (LEVOPHED) 16 mg in catheterization  LVgram  Coronary angiogam  Coronary cath  PROCEDURE DESCRIPTION   Risks/benefits/alternatives/outcomes were discussed with patient and/or family and informed consent was obtained. Using the [facility] scale, the patient's right radial artery was found to be a level B. Patient was prepped draped in the usual sterile fashion. Local anaesthetic was applied over puncture site. Using a back wall technique, a 6 [place]inese Terumo sheath was inserted into right radial artery. Verapamil, nitroglycerin, nicardipine were administered through the sheath. Heparin was administered. Diagnostic 5 Malagasy Medtronic pigtail and ultra catheters were used for diagnostic angiograms. At the conclusion of the procedure, a TR band was placed over the puncture site and hemostasis was obtained. There were no immediate complications. I supervised sedation with versed 2 mg/fentanyl 50 Mcg during the procedure. 100 cc contrast was utilized. <20cc EBL. FINDINGS     LVEDP  15   GRADIENT ACROSS AORTIC VALVE  <10mm HG   LV FUNCTION EF 60 %   WALL MOTION  normal   MITRAL REGURGITATION  mild      LM  Proximal-mid 10 to 20% stenosis with distal 90% stenosis that extends into both the LAD and circumflex, calcified vessel.         LAD  There is a stent in the ostium of the LAD that extends to the proximal and mid segments, there is 99% ostial/proximal stenosis with mid 60% stenosis and distal 60% stenosis.     D1, D2, D3 have diffuse disease with D1 having proximal 60% stenosis and D2 and D3 have ostial 90% stenoses, these are small diagonal branches.         LCX Ostial/proximal 90% stenosis, calcified vessel, proximal 30 to 40% stenosis followed by mid-distal 60 to 70% stenosis that extends into the first obtuse marginal.  There is a bifurcation point distally in this vessel and the lower branch has ostial 70 to 80% stenosis.       RCA  Dominant, calcified, ostial/proximal 40 to 50% stenosis. Mid-distal 30% stenosis.   Vessel is calcified, PDA and PLV have [id number]-[id number]-jlvkmo 20 to 30% stenoses. CONCLUSIONS:    Severe left main/LAD/circumflex disease  Refer for CABG  Continue heparin drip, hold Plavix in anticipation of surgery, will consider bridging with Integrilin/cangrelor pending removal of TR band. Echo 7/17/2019:  Normal left ventricular systolic function with ejection fraction of 55-60%. No regional wall motion abnormalites are seen. Grade I diastolic dysfunction with normal filling pressure. Mild aortic   stenosis. Mild aortic regurgitation. Systolic pulmonic artery pressure (SPAP) is normal estimated at 23 mmHg   (Right atrial pressure of 3 mmHg).     Lab Reviewed:     Renal Profile:  Lab Results   Component Value Date    CREATININE 0.7 07/10/2020    BUN 10 07/10/2020     07/10/2020    K 3.5 07/10/2020    K 3.3 07/09/2020     07/10/2020    CO2 23 07/10/2020     CBC:    Lab Results   Component Value Date    WBC 8.6 07/10/2020    RBC 4.36 07/10/2020    HGB 13.7 07/10/2020    HCT 39.2 07/10/2020    MCV 89.9 07/10/2020    RDW 14.0 07/10/2020    PLT 97 07/10/2020     BNP:  No results found for: PROBNP  Fasting Lipid Panel:    Lab Results   Component Value Date    CHOL 97 10/11/2019    HDL 37 10/11/2019    TRIG 116 10/11/2019     Cardiac Enzymes:  CK/MbTroponin  Lab Results   Component Value Date    TROPONINI 0.04 07/08/2020     PT/ INR   Lab Results   Component Value Date    INR 1.11 07/25/2019    INR 1.13 07/16/2019    PROTIME 12.6 07/25/2019    PROTIME 12.9 07/16/2019     PTT No results found for: PTT   Lab Results   Component Value Date    MG 1.60 07/10/2020    No results found for: TSH    All labs and imaging reviewed today    Assessment:  Unstable angina  CAD: awaiting CABG, significant LM disease on angiogram 7/9/2020; s/p orbital atherectomy and BRET x2 in 2016 and repeat in 2019 with BRET x3  HTN: sub optimal  HLD: controlled, LDL 37, continue statin  Right radial hematoma: improved, pressure held    Plan:   1. Increase toprol  2. Add nitrates  3. Holding plavix (last dose 7/9/2020)  4. Cangrelor until CABG given significant LM disease  5. Echo  6. Continue aspirin, statin  7.  Anticipate CABG 7/13/2020; sooner if symptoms    Ernestina Louis, APRN-CNP  [employer]  [phone]

## 2020-07-10 NOTE — PLAN OF CARE
Problem: Falls - Risk of:  Goal: Will remain free from falls  Description: Will remain free from falls  7/10/2020 1031 by Magy Arora RN  Note: Patient educated on fall risk precautions. Patient bed in low position, bed alarm in place, call light and bedside table within reach. No falls reported this shift.     7/9/2020 2247 by Shannon Squires RN  Outcome: Ongoing  Goal: Absence of physical injury  Description: Absence of physical injury  7/9/2020 2247 by Shannon Squires RN  Outcome: Ongoing     Problem: Pain:  Goal: Pain level will decrease  Description: Pain level will decrease  7/9/2020 2247 by Shannon Squires RN  Outcome: Ongoing  Goal: Control of acute pain  Description: Control of acute pain  7/9/2020 2247 by Shannon Squires RN  Outcome: Ongoing  Goal: Control of chronic pain  Description: Control of chronic pain  7/9/2020 2247 by Shannon Squires RN  Outcome: Ongoing

## 2020-07-10 NOTE — ANESTHESIA PRE PROCEDURE
Department of Anesthesiology  Preprocedure Note       Name:  Thuy Sterling   Age:  80 y.o.  :  1933                                          MRN:  [id number]         Date:  7/10/2020      Surgeon: Shobha Martino):  Madan Quiñonez MD    Procedure: Procedure(s):  CORONARY ARTERY BYPASS GRAFTING X2, INTERNAL MAMMARY ARTERY, SAPHENOUS VEIN GRAFT, ON PUMP    Medications prior to admission:   Prior to Admission medications    Medication Sig Start Date End Date Taking?  Authorizing Provider   atorvastatin (LIPITOR) 40 MG tablet Take 1 tablet by mouth daily 10/9/19  Yes Manny Funez MD   Respiratory Therapy Supplies (SPIROMETER) KIT 1 Units by Does not apply route daily 19  Yes Manny Funez MD   metoprolol succinate (TOPROL XL) 25 MG extended release tablet Take 25 mg by mouth 2 times daily (with meals)   Yes Historical Provider, MD   clopidogrel (PLAVIX) 75 MG tablet Take 75 mg by mouth daily   Yes Historical Provider, MD   triamterene-hydrochlorothiazide (DYAZIDE) 37.5-25 MG per capsule Take 1 capsule by mouth every morning   Yes Historical Provider, MD   levothyroxine (SYNTHROID) 50 MCG tablet Take 50 mcg by mouth Daily   Yes Historical Provider, MD   allopurinol (ZYLOPRIM) 300 MG tablet Take 300 mg by mouth daily   Yes Historical Provider, MD   aspirin 81 MG tablet Take 81 mg by mouth daily   Yes Historical Provider, MD   nitroGLYCERIN (NITROSTAT) 0.4 MG SL tablet Place 1 tablet under the tongue every 5 minutes as needed for Chest pain 19  Yes Manny Funez MD       Current medications:    Current Facility-Administered Medications   Medication Dose Route Frequency Provider Last Rate Last Dose    nitroGLYCERIN (NITROSTAT) 0.4 MG SL tablet             nitroGLYCERIN (NITROSTAT) SL tablet 0.4 mg  0.4 mg Sublingual Q5 Min PRN EDEN Mckeon - CNP   0.4 mg at 07/10/20 0914    cangrelor (KENGREAL) 50 mg in sodium chloride 0.9 % 250 mL infusion  0.75 mcg/kg/min Intravenous Continuous a[employer] Alexander Herbert MD 20.2 mL/hr at 07/10/20 1009 0.75 mcg/kg/min at 07/10/20 1009    morphine (PF) injection 2 mg  2 mg Intravenous Q2H PRN [address], APRN - CNP        Or    morphine (PF) injection 4 mg  4 mg Intravenous Q2H PRN [address], APRN - CNP        0.9 % sodium chloride infusion   Intravenous Continuous Shilpa Lozano MD 75 mL/hr at 07/09/20 1153      perflutren lipid microspheres (DEFINITY) injection 1.65 mg  1.5 mL Intravenous ONCE PRN Shilpa Lozano MD        heparin (porcine) injection 4,000 Units  4,000 Units Intravenous PRN Shilpa Lozano MD        heparin (porcine) injection 2,000 Units  2,000 Units Intravenous PRN Shilpa Lozano MD        sodium chloride flush 0.9 % injection 10 mL  10 mL Intravenous 2 times per day Shilpa Lozano MD        sodium chloride flush 0.9 % injection 10 mL  10 mL Intravenous PRN Shilpa Lozano MD        acetaminophen (TYLENOL) tablet 650 mg  650 mg Oral Q4H PRN Shilpa Lozano MD        atorvastatin (LIPITOR) tablet 80 mg  80 mg Oral Daily Terri Del Rio MD   80 mg at 07/10/20 0933    magnesium sulfate 1 g in dextrose 5% 100 mL IVPB  1 g Intravenous PRN Terri Del Rio MD        potassium chloride (KLOR-CON M) extended release tablet 40 mEq  40 mEq Oral PRN Terri Del Rio MD        Or    potassium bicarb-citric acid (EFFER-K) effervescent tablet 40 mEq  40 mEq Oral PRN Terri Del Rio MD   40 mEq at 07/09/20 1532    Or    potassium chloride 10 mEq/100 mL IVPB (Peripheral Line)  10 mEq Intravenous PRN Terri Del Rio MD        heparin 25,000 units in dextrose 5% 250 mL infusion  7.7 mL/hr Intravenous Continuous Terri Del Rio MD 7.7 mL/hr at 07/09/20 2001 7.7 mL/hr at 07/09/20 2001    allopurinol (ZYLOPRIM) tablet 300 mg  300 mg Oral Daily Noah Sauer MD   300 mg at 07/10/20 0933    aspirin chewable tablet 81 mg  81 mg Oral Daily Noah Sauer MD   81 mg at 07/10/20 0933    levothyroxine (SYNTHROID) tablet 50 mcg  50 mcg Rotational Atherectomy and BRET x 3 to LM/LAD    CORONARY ANGIOPLASTY WITH STENT PLACEMENT  12/22/2016    Dr Bradley Zaragoza, Orbital Atherectomy and BRET x 2 to LAD - Synergy 2.75x16 and 3x8, post dilated to 3mm NC.  JOINT REPLACEMENT Left        Social History:    Social History     Tobacco Use    Smoking status: Never Smoker    Smokeless tobacco: Never Used   Substance Use Topics    Alcohol use: Not Currently                                Counseling given: Not Answered      Vital Signs (Current):   Vitals:    07/09/20 1930 07/09/20 2315 07/10/20 0619 07/10/20 0810   BP: (!) 104/57 138/79 115/69 (!) 153/72   Pulse: 78 88 79 76   Resp: 18 18 20 18   Temp: 98.9 °F (37.2 °C) 98.2 °F (36.8 °C) 98.1 °F (36.7 °C) 98.5 °F (36.9 °C)   TempSrc: Oral Oral Oral Oral   SpO2: 95% 95% 95% 93%   Weight:       Height:                                                  BP Readings from Last 3 Encounters:   07/10/20 (!) 153/72   10/09/19 116/72   08/09/19 100/60       NPO Status:                                                                                 BMI:   Wt Readings from Last 3 Encounters:   07/09/20 198 lb 1.6 oz (89.9 kg)   10/09/19 203 lb (92.1 kg)   08/09/19 200 lb 9.6 oz (91 kg)     Body mass index is 29.25 kg/m².     CBC:   Lab Results   Component Value Date    WBC 8.6 07/10/2020    RBC 4.36 07/10/2020    HGB 13.7 07/10/2020    HCT 39.2 07/10/2020    MCV 89.9 07/10/2020    RDW 14.0 07/10/2020    PLT 97 07/10/2020       CMP:   Lab Results   Component Value Date     07/10/2020    K 3.5 07/10/2020    K 3.3 07/09/2020     07/10/2020    CO2 23 07/10/2020    BUN 10 07/10/2020    CREATININE 0.7 07/10/2020    GFRAA >60 07/10/2020    AGRATIO 1.3 07/08/2020    LABGLOM >60 07/10/2020    GLUCOSE 121 07/10/2020    PROT 7.7 07/08/2020    PROT 8.1 10/11/2019    CALCIUM 8.7 07/10/2020    BILITOT 0.9 07/08/2020    ALKPHOS 69 07/08/2020    AST 38 07/08/2020    ALT 42 07/08/2020       POC Tests: No results for input(s): POCGLU, Zigmund Austin, POCCL, POCBUN, POCHEMO, POCHCT in the last 72 hours. Coags:   Lab Results   Component Value Date    PROTIME 12.6 07/25/2019    INR 1.11 07/25/2019    APTT 53.1 07/10/2020       HCG (If Applicable): No results found for: PREGTESTUR, PREGSERUM, HCG, HCGQUANT     ABGs: No results found for: PHART, PO2ART, OEB6MZB, RUI0EIS, BEART, G0LYVAMG     Type & Screen (If Applicable):  No results found for: LABABO, LABRH    Drug/Infectious Status (If Applicable):  No results found for: HIV, HEPCAB    COVID-19 Screening (If Applicable): No results found for: COVID19      Anesthesia Evaluation    Airway: Mallampati: II  TM distance: >3 FB   Neck ROM: limited  Mouth opening: > = 3 FB Dental:    (+) upper dentures and lower dentures      Pulmonary:                              Cardiovascular:    (+) hypertension:, angina:, CAD: obstructive, hyperlipidemia                  Neuro/Psych:   (+) neuromuscular disease:,             GI/Hepatic/Renal:             Endo/Other:                     Abdominal:           Vascular:                                      Anesthesia Plan      general     ASA 4     (  [address] [place] [address] Close EVALUATION FORM       Name:  Romie Camargo                                         Age:  80 y.o. MRN:  [id number]           I discussed intravenous with inhalational endotracheal anesthesia with pulmonary artery catheter, radial ( or other artery if required) catheter, possible transesophageal echocardiography and post-operative ventilation including risks and alternatives with the patient . The patient has no further questions. River Tiwari MD  July 10, 2020  10:31 AM)  Induction: inhalational and intravenous. arterial line, BIS, CVP, PA catheter and ELISEO    Anesthetic plan and risks discussed with patient.                       River Tiwari MD   7/10/2020

## 2020-07-10 NOTE — PROGRESS NOTES
Pt educated on increased fall risk and using call light before getting out of bed as he can get more drowsy and dizzy with Ativan. Pt verbalized understanding.

## 2020-07-10 NOTE — PROGRESS NOTES
Page to Dr. Mihaela Lin: I had a conversation with the patient to try and figure out if he was anxious from the procedure or what was going on. He said he didn't start feeling like this until he had the chest pain this morning. He said he feels like he's choking and short of breath and that sometimes he can catch his breath after 3 or 4 breaths, but he is not in any visible distress and his respirations are not increased. He was very fidgety in there and I couldn't really get any straight answers for why he was feeling this way, I asked specifically about the procedure and he said no and went on to say he doesn't know why he's feeling like this. I bladder scanned him and he only had 180 in there, I did just empty 400 mls from his urinal and he states he has been urinating fine today. He stated \"I want something to basically knock me out\" and then even asked if he could go home until Monday and come back. I tried to explain why we didn't want to give any anxiety medications but I don't think he was getting it.

## 2020-07-10 NOTE — PROGRESS NOTES
Writer spoke with Barbara Keller, RN in cardiology regarding patient's swelling around right radial cath site, she is to let Dr. Bernard Peck know. Barbara Keller also aware of change from heparin to cangrelor.

## 2020-07-10 NOTE — PROGRESS NOTES
Janet Ocasio NP aware of swelling in patient's right radial cath site. Pulses are good and pt has no c/o pain, will continue to monitor. Pt educated on importance of minimizing use of that wrist, verbalizes understanding but keeps forgetting not to use it.

## 2020-07-10 NOTE — PROGRESS NOTES
Patient performed Full PFT in Pulmonary Function Lab. Tolerated well. Copy of test placed in patients chart.

## 2020-07-10 NOTE — FLOWSHEET NOTE
07/10/20 0810   Assessment   Charting Type Shift assessment   Neurological   Neuro (WDL) WDL   Level of Consciousness 0   [place] Coma Scale   Eye Opening 4   Best Verbal Response 5   Best Motor Response 6   [place] Coma Scale Score 15   HEENT   HEENT (WDL) X   Right Eye Impaired vision; Intact  (Reading glasses)   Left Eye Impaired vision; Intact  (Reading glasses)   Tongue Pink & moist   Voice Normal   Mucous Membrane Moist;Pink; Intact   Teeth Dentures upper;Dentures lower   Respiratory   Respiratory (WDL) WDL   Respiratory Pattern Regular   Respiratory Depth Normal   Respiratory Quality/Effort Unlabored   Chest Assessment Chest expansion symmetrical;Trachea midline   L Breath Sounds Clear   R Breath Sounds Clear   Cardiac   Cardiac (WDL) X  (Chest pain with activity)   Cardiac Regularity Regular   Heart Sounds S1, S2   Cardiac Rhythm NSR   Rhythm Interpretation   Pulse 76   Cardiac Monitor   Telemetry Monitor On Yes   Telemetry Audible Yes   Telemetry Alarms Set Yes   Gastrointestinal   Abdominal (WDL) WDL   Abdomen Inspection Soft;Non-distended   Tenderness Soft; No guarding;Nontender; No rebound   RUQ Bowel Sounds Active   LUQ Bowel Sounds Active   RLQ Bowel Sounds Active   LLQ Bowel Sounds Active   Peripheral Vascular   Peripheral Vascular (WDL) X   Edema Right lower extremity; Left lower extremity   RLE Edema +1   LLE Edema +1   RUE Neurovascular Assessment   R Radial Pulse +2   Temperature Warm   LUE Neurovascular Assessment   L Radial Pulse +2   Temperature Warm   RLE Neurovascular Assessment   Temperature Warm   R Post Tibial Pulse +2   R Pedal Pulse +2   LLE Neurovascular Assessment   Temperature Warm   L Post Tibial Pulse +2   L Pedal Pulse +2   Puncture Site Assessment 1   Location Radial - right   Site Assessment Tender;Warm;Swollen   Hemostasis Intervention Discontinued   Skin Color/Condition   Skin Color/Condition (WDL) WDL   Skin Integrity   Skin Integrity (WDL) WDL   Musculoskeletal   Musculoskeletal (WDL) WDL   Genitourinary   Genitourinary (WDL) WDL   Flank Tenderness No   Suprapubic Tenderness No   Dysuria No   Anus/Rectum   Anus/Rectum (WDL) WDL   Psychosocial   Psychosocial (WDL) WDL   Patient Behaviors Calm; Cooperative

## 2020-07-10 NOTE — PROGRESS NOTES
Page to Dr. Natalia Pinto: Is pt able to have anything for anxiety? After speaking with CT surgery this morning he's been very anxious and he states that he's short of breath but he appears to be unlabored and his oxygen is 97%, he says he also feels kind of nauseous and thinks its all related to nerves.

## 2020-07-10 NOTE — PROGRESS NOTES
Pt requested for staff not to tell his family anything and that he would just tell them when it's all over. Writer tried to explain that this procedure was a big deal and that support would be needed. Pt states that if something were to happen to him during the procedure, then it happens. Writer informed Suzi Morrissey from CT surgery of pt's request, she is going to come up later and speak with him.

## 2020-07-10 NOTE — FLOWSHEET NOTE
07/09/20 1930   Neurological   Neuro (WDL) WDL   Level of Consciousness 0   [place] Coma Scale   Eye Opening 4   Best Verbal Response 5   Best Motor Response 6   [place] Coma Scale Score 15   HEENT   HEENT (WDL) X   Right Eye Impaired vision  (Reading glasses)   Left Eye Impaired vision   Teeth Dentures upper;Dentures lower   Respiratory   Respiratory (WDL) WDL   Respiratory Pattern Regular   Respiratory Depth Normal   Respiratory Quality/Effort Unlabored   Chest Assessment Chest expansion symmetrical;Trachea midline   L Breath Sounds Clear   R Breath Sounds Clear   Cardiac   Cardiac (WDL) X  (Chest pain with activity)   Cardiac Regularity Regular   Heart Sounds S1, S2   Rhythm Interpretation   Pulse 78   Cardiac Monitor   Telemetry Monitor On Yes   Telemetry Audible Yes   Telemetry Alarms Set Yes   Gastrointestinal   Abdominal (WDL) WDL   Abdomen Inspection Soft;Non-distended   Tenderness Soft; No guarding;Nontender; No rebound   RUQ Bowel Sounds Active   LUQ Bowel Sounds Active   RLQ Bowel Sounds Active   LLQ Bowel Sounds Active   Peripheral Vascular   Peripheral Vascular (WDL) X   Edema Right lower extremity; Left lower extremity   RLE Edema +1   LLE Edema +1   Puncture Site Assessment 1   Location Radial - right   Site Assessment No redness, drainage, swelling or hematoma   Hemostasis Intervention Discontinued   Skin Color/Condition   Skin Color/Condition (WDL) WDL   Skin Integrity   Skin Integrity (WDL) WDL   Musculoskeletal   Musculoskeletal (WDL) WDL   Genitourinary   Genitourinary (WDL) WDL   Flank Tenderness No   Suprapubic Tenderness No   Dysuria No   Anus/Rectum   Anus/Rectum (WDL) WDL   Psychosocial   Psychosocial (WDL) WDL   Patient Behaviors Calm; Cooperative   Patient lying in bed. Bed in lowest position and locked. Bed alarm on. 2/4 bed rails up. Patient call light and belongings within reach. Will continue to monitor.

## 2020-07-11 ENCOUNTER — APPOINTMENT (OUTPATIENT)
Dept: VASCULAR LAB | Age: 85
DRG: 234 | End: 2020-07-11
Payer: MEDICARE

## 2020-07-11 LAB
ABO/RH: NORMAL
ANTIBODY SCREEN: NORMAL
APTT: 32.3 SEC (ref 24.2–36.2)
LV EF: 55 %
LVEF MODALITY: NORMAL
MAGNESIUM: 2.3 MG/DL (ref 1.8–2.4)

## 2020-07-11 PROCEDURE — 99232 SBSQ HOSP IP/OBS MODERATE 35: CPT | Performed by: NURSE PRACTITIONER

## 2020-07-11 PROCEDURE — 86900 BLOOD TYPING SEROLOGIC ABO: CPT

## 2020-07-11 PROCEDURE — 93971 EXTREMITY STUDY: CPT

## 2020-07-11 PROCEDURE — 6370000000 HC RX 637 (ALT 250 FOR IP): Performed by: INTERNAL MEDICINE

## 2020-07-11 PROCEDURE — 2580000003 HC RX 258: Performed by: THORACIC SURGERY (CARDIOTHORACIC VASCULAR SURGERY)

## 2020-07-11 PROCEDURE — 83735 ASSAY OF MAGNESIUM: CPT

## 2020-07-11 PROCEDURE — P9017 PLASMA 1 DONOR FRZ W/IN 8 HR: HCPCS

## 2020-07-11 PROCEDURE — 86901 BLOOD TYPING SEROLOGIC RH(D): CPT

## 2020-07-11 PROCEDURE — 36415 COLL VENOUS BLD VENIPUNCTURE: CPT

## 2020-07-11 PROCEDURE — 2580000003 HC RX 258: Performed by: INTERNAL MEDICINE

## 2020-07-11 PROCEDURE — P9035 PLATELET PHERES LEUKOREDUCED: HCPCS

## 2020-07-11 PROCEDURE — P9012 CRYOPRECIPITATE EACH UNIT: HCPCS

## 2020-07-11 PROCEDURE — 6370000000 HC RX 637 (ALT 250 FOR IP): Performed by: NURSE PRACTITIONER

## 2020-07-11 PROCEDURE — 93880 EXTRACRANIAL BILAT STUDY: CPT

## 2020-07-11 PROCEDURE — 2060000000 HC ICU INTERMEDIATE R&B

## 2020-07-11 PROCEDURE — 85730 THROMBOPLASTIN TIME PARTIAL: CPT

## 2020-07-11 PROCEDURE — 86923 COMPATIBILITY TEST ELECTRIC: CPT

## 2020-07-11 PROCEDURE — 93306 TTE W/DOPPLER COMPLETE: CPT

## 2020-07-11 PROCEDURE — 86850 RBC ANTIBODY SCREEN: CPT

## 2020-07-11 PROCEDURE — 6360000002 HC RX W HCPCS: Performed by: THORACIC SURGERY (CARDIOTHORACIC VASCULAR SURGERY)

## 2020-07-11 PROCEDURE — C9460 INJECTION, CANGRELOR: HCPCS | Performed by: THORACIC SURGERY (CARDIOTHORACIC VASCULAR SURGERY)

## 2020-07-11 RX ADMIN — Medication 10 ML: at 23:46

## 2020-07-11 RX ADMIN — ATORVASTATIN CALCIUM 80 MG: 80 TABLET, FILM COATED ORAL at 08:55

## 2020-07-11 RX ADMIN — ACETAMINOPHEN 650 MG: 325 TABLET, FILM COATED ORAL at 23:56

## 2020-07-11 RX ADMIN — METOPROLOL SUCCINATE 50 MG: 50 TABLET, EXTENDED RELEASE ORAL at 08:55

## 2020-07-11 RX ADMIN — PRIMIDONE 50 MG: 50 TABLET ORAL at 23:45

## 2020-07-11 RX ADMIN — CANGRELOR 0.75 MCG/KG/MIN: 50 INJECTION, POWDER, LYOPHILIZED, FOR SOLUTION INTRAVENOUS at 14:50

## 2020-07-11 RX ADMIN — ASPIRIN 81 MG 81 MG: 81 TABLET ORAL at 08:55

## 2020-07-11 RX ADMIN — TRIAMTERENE AND HYDROCHLOROTHIAZIDE 1 TABLET: 37.5; 25 TABLET ORAL at 08:55

## 2020-07-11 RX ADMIN — MELATONIN TAB 5 MG 5 MG: 5 TAB at 23:56

## 2020-07-11 RX ADMIN — LEVOTHYROXINE SODIUM 50 MCG: 0.05 TABLET ORAL at 06:17

## 2020-07-11 RX ADMIN — PRIMIDONE 50 MG: 50 TABLET ORAL at 14:50

## 2020-07-11 RX ADMIN — PRIMIDONE 50 MG: 50 TABLET ORAL at 08:55

## 2020-07-11 RX ADMIN — NITROGLYCERIN 0.5 INCH: 20 OINTMENT TOPICAL at 09:33

## 2020-07-11 RX ADMIN — ACETAMINOPHEN 650 MG: 325 TABLET, FILM COATED ORAL at 12:46

## 2020-07-11 RX ADMIN — NITROGLYCERIN 0.5 INCH: 20 OINTMENT TOPICAL at 12:42

## 2020-07-11 RX ADMIN — LORAZEPAM 0.5 MG: 0.5 TABLET ORAL at 06:17

## 2020-07-11 RX ADMIN — ALLOPURINOL 300 MG: 300 TABLET ORAL at 08:55

## 2020-07-11 RX ADMIN — Medication 10 ML: at 09:33

## 2020-07-11 RX ADMIN — Medication 10 ML: at 12:44

## 2020-07-11 ASSESSMENT — ENCOUNTER SYMPTOMS
RESPIRATORY NEGATIVE: 1
GASTROINTESTINAL NEGATIVE: 1

## 2020-07-11 ASSESSMENT — PAIN SCALES - GENERAL
PAINLEVEL_OUTOF10: 2
PAINLEVEL_OUTOF10: 0
PAINLEVEL_OUTOF10: 5
PAINLEVEL_OUTOF10: 0

## 2020-07-11 NOTE — PROGRESS NOTES
[address]  Cardiology  Progress Note    Admission date:  2020    Reason for follow up visit: CAD    HPI/CC: Sherine Canada is a 80 y.o. male was admitted 2020 for chest pain. Troponin elevated. [facility] 2020 showed severe LM disease, referred to CT surgery. Plan for CABG 2020. Rhythm overnight has been sinus. Subjective: Denies chest pain, shortness of breath, palpitations and dizziness. Vitals:  Blood pressure 118/66, pulse 71, temperature 97.5 °F (36.4 °C), temperature source Oral, resp. rate 16, height 5' 9\" (1.753 m), weight 196 lb 3.2 oz (89 kg), SpO2 97 %.   Temp  Av.9 °F (36.6 °C)  Min: 97.5 °F (36.4 °C)  Max: 98.5 °F (36.9 °C)  Pulse  Av.8  Min: 70  Max: 87  BP  Min: 109/54  Max: 154/77  SpO2  Av.7 %  Min: 94 %  Max: 97 %    24 hour I/O    Intake/Output Summary (Last 24 hours) at 2020 0824  Last data filed at 2020 0745  Gross per 24 hour   Intake 390 ml   Output 2150 ml   Net -1760 ml     Current Facility-Administered Medications   Medication Dose Route Frequency Provider Last Rate Last Dose    nitroglycerin (NITRO-BID) 2 % ointment 0.5 inch  0.5 inch Topical 4 times per day EDEN Hernandez CNP        nitroGLYCERIN (NITROSTAT) SL tablet 0.4 mg  0.4 mg Sublingual Q5 Min PRN EDEN Pate CNP   0.4 mg at 07/10/20 0914    cangrelor (KENGREAL) 50 mg in sodium chloride 0.9 % 250 mL infusion  0.75 mcg/kg/min Intravenous Continuous Bev Duncan MD 20.2 mL/hr at 07/10/20 2331 0.75 mcg/kg/min at 07/10/20 2331    morphine (PF) injection 2 mg  2 mg Intravenous Q2H PRN EDEN Corbett CNP        Or    morphine (PF) injection 4 mg  4 mg Intravenous Q2H PRN EDEN Pate - CE       [facility] insulin regular (HUMULIN R;NOVOLIN R) 100 Units in sodium chloride 0.9 % 100 mL infusion  0.5 Units/hr Intravenous On Call to [id number] [address] [place] [address] [place], MD        [START ON 2020] norepinephrine (LEVOPHED) 16 mg in dextrose 5 % 250 mL infusion  2 mcg/min Intravenous Continuous Juma Grady MD        [START ON 7/13/2020] lactated ringers infusion   Intravenous Continuous [facility], APRN - [facility]        [START ON 7/13/2020] aspirin EC tablet 81 mg  81 mg Oral Once [facility], APRN - [facility]        [START ON 7/12/2020] bisacodyl (DULCOLAX) suppository 10 mg  10 mg Rectal Once [facility], APRN - [facility]        metoprolol succinate (TOPROL XL) extended release tablet 50 mg  50 mg Oral BID WC Sergio Hull, APRN - CNP   50 mg at 07/10/20 1739    LORazepam (ATIVAN) tablet 0.5 mg  0.5 mg Oral Q8H PRN Wilder Hernandez MD   0.5 mg at 07/11/20 0617    0.9 % sodium chloride infusion   Intravenous Continuous Madina Avalos MD   Stopped at 07/10/20 1000    perflutren lipid microspheres (DEFINITY) injection 1.65 mg  1.5 mL Intravenous ONCE PRN Madina Avalos MD        sodium chloride flush 0.9 % injection 10 mL  10 mL Intravenous 2 times per day Madina Avalos MD        sodium chloride flush 0.9 % injection 10 mL  10 mL Intravenous PRN Madina Avalos MD        acetaminophen (TYLENOL) tablet 650 mg  650 mg Oral Q4H PRN Madina Avalos MD        atorvastatin (LIPITOR) tablet 80 mg  80 mg Oral Daily Wilder Hernandez MD   80 mg at 07/10/20 0933    magnesium sulfate 1 g in dextrose 5% 100 mL IVPB  1 g Intravenous PRN Wilder Hernandez MD   Stopped at 07/10/20 2335    potassium chloride (KLOR-CON M) extended release tablet 40 mEq  40 mEq Oral PRN Wilder Hernandez MD        Or    potassium bicarb-citric acid (EFFER-K) effervescent tablet 40 mEq  40 mEq Oral PRN Wilder Hernandez MD   40 mEq at 07/09/20 1532    Or    potassium chloride 10 mEq/100 mL IVPB (Peripheral Line)  10 mEq Intravenous PRN Wilder Hernandez MD        allopurinol (ZYLOPRIM) tablet 300 mg  300 mg Oral Daily Makeda Nicholas MD   300 mg at 07/10/20 0933    aspirin chewable tablet 81 mg  81 mg Oral Daily Makeda Nicholas MD   81 mg at 07/10/20 0008    levothyroxine (SYNTHROID) tablet 50 mcg  50 mcg Oral Daily Hernan Huerta MD   50 mcg at 07/11/20 0617    primidone (MYSOLINE) tablet 50 mg  50 mg Oral TID Hernan Huerta MD   50 mg at 07/10/20 2052    triamterene-hydroCHLOROthiazide (MAXZIDE-25) 37.5-25 MG per tablet 1 tablet  1 tablet Oral QAM Hernan Huerta MD   1 tablet at 07/10/20 0933    sodium chloride flush 0.9 % injection 10 mL  10 mL Intravenous 2 times per day Hernan Huerta MD   10 mL at 07/08/20 2206    sodium chloride flush 0.9 % injection 10 mL  10 mL Intravenous PRN Hernan Huerta MD        acetaminophen (TYLENOL) tablet 650 mg  650 mg Oral Q6H PRN Hernan Huerta MD   650 mg at 07/10/20 2235    Or    acetaminophen (TYLENOL) suppository 650 mg  650 mg Rectal Q6H PRN Hernan Huerta MD        polyethylene glycol [facility]) packet 17 g  17 g Oral Daily PRN Hernan Huerta MD        promethazine (PHENERGAN) tablet 12.5 mg  12.5 mg Oral Q6H PRN Hernan Huerta MD        Or    [facility]ron [facility] injection 4 mg  4 mg Intravenous Q6H PRN Hernan Huerta MD        melatonin tablet 5 mg  5 mg Oral Nightly PRN EDEN Wright - NP   5 mg at 07/10/20 2235     Review of Systems   Constitutional: Negative. Respiratory: Negative. Cardiovascular: Negative. Gastrointestinal: Negative. Neurological: Negative.       Objective:     Telemetry monitor: SR    Physical Exam:  Constitutional:  Comfortable and alert, NAD, appears stated age  Eyes: PERRL, sclera nonicteric  Neck:  Supple, no masses, no thyroidmegaly, no JVD  Skin:  Warm and dry; no rash or lesions  Heart:  Regular, normal apex, S1 and S2 normal, no M/G/R  Lungs:  Normal respiratory effort; clear; no wheezing/rhonchi/rales  Abdomen: soft, non tender, + bowel sounds  Extremities:  No edema or cyanosis; no clubbing  Neuro: alert and oriented, moves legs and arms equally, normal mood and affect  Right radial site soft, no hematoma, 2+ pulse, nontender, no ooze, mild ecchymosis    Data Reviewed:    Coronary angiogram 7/9/2020:  Unstable angina  PROCEDURES PERFORMED    Left heart catheterization  LVgram  Coronary angiogam  Coronary cath  PROCEDURE DESCRIPTION   Risks/benefits/alternatives/outcomes were discussed with patient and/or family and informed consent was obtained. Using the [facility] scale, the patient's right radial artery was found to be a level B. Patient was prepped draped in the usual sterile fashion. Local anaesthetic was applied over puncture site. Using a back wall technique, a 6 [place] Terumo sheath was inserted into right radial artery. Verapamil, nitroglycerin, nicardipine were administered through the sheath. Heparin was administered. Diagnostic 5 [place] Medtronic pigtail and ultra catheters were used for diagnostic angiograms. At the conclusion of the procedure, a TR band was placed over the puncture site and hemostasis was obtained. There were no immediate complications. I supervised sedation with versed 2 mg/fentanyl 50 Mcg during the procedure. 100 cc contrast was utilized. <20cc EBL.   FINDINGS     LVEDP  15   GRADIENT ACROSS AORTIC VALVE  <10mm HG   LV FUNCTION EF 60 %   WALL MOTION  normal   MITRAL REGURGITATION  mild      LM  Proximal-mid 10 to 20% stenosis with distal 90% stenosis that extends into both the LAD and circumflex, calcified vessel.         LAD  There is a stent in the ostium of the LAD that extends to the proximal and mid segments, there is 99% ostial/proximal stenosis with mid 60% stenosis and distal 60% stenosis.     D1, D2, D3 have diffuse disease with D1 having proximal 60% stenosis and D2 and D3 have ostial 90% stenoses, these are small diagonal branches.         LCX Ostial/proximal 90% stenosis, calcified vessel, proximal 30 to 40% stenosis followed by mid-distal 60 to 70% stenosis that extends into the first obtuse marginal.  There is a bifurcation point distally in this vessel and the lower branch has ostial 70 to 80% stenosis.       RCA  Dominant, calcified, ostial/proximal 40 to 50% stenosis. Mid-distal 30% stenosis. Vessel is calcified, PDA and PLV have [id number]-vfa-[id number] 20 to 30% stenoses. CONCLUSIONS:    Severe left main/LAD/circumflex disease  Refer for CABG  Continue heparin drip, hold Plavix in anticipation of surgery, will consider bridging with Integrilin/cangrelor pending removal of TR band. Echo 7/17/2019:  Normal left ventricular systolic function with ejection fraction of 55-60%. No regional wall motion abnormalites are seen. Grade I diastolic dysfunction with normal filling pressure. Mild aortic   stenosis. Mild aortic regurgitation. Systolic pulmonic artery pressure (SPAP) is normal estimated at 23 mmHg   (Right atrial pressure of 3 mmHg).     Lab Reviewed:     Renal Profile:  Lab Results   Component Value Date    CREATININE 0.7 07/10/2020    BUN 10 07/10/2020     07/10/2020    K 3.5 07/10/2020    K 3.3 07/09/2020     07/10/2020    CO2 23 07/10/2020     CBC:    Lab Results   Component Value Date    WBC 8.6 07/10/2020    RBC 4.36 07/10/2020    HGB 13.7 07/10/2020    HCT 39.2 07/10/2020    MCV 89.9 07/10/2020    RDW 14.0 07/10/2020    PLT 97 07/10/2020     BNP:  No results found for: PROBNP  Fasting Lipid Panel:    Lab Results   Component Value Date    CHOL 97 10/11/2019    HDL 37 10/11/2019    TRIG 116 10/11/2019     Cardiac Enzymes:  CK/MbTroponin  Lab Results   Component Value Date    TROPONINI 0.04 07/08/2020     PT/ INR   Lab Results   Component Value Date    INR 1.11 07/25/2019    INR 1.13 07/16/2019    PROTIME 12.6 07/25/2019    PROTIME 12.9 07/16/2019     PTT No results found for: PTT   Lab Results   Component Value Date    MG 2.30 07/11/2020    No results found for: TSH    All labs and imaging reviewed today    Assessment:  Unstable angina  CAD: awaiting CABG, significant LM disease on angiogram 7/9/2020; s/p orbital atherectomy and BRET x2 in 2016 and repeat in 2019 with BRET x3  HTN: better controlled  HLD: controlled, LDL 37, continue statin  Right radial hematoma: improved    Plan:   1. Continue toprol, aspirin, statin, nitro   2. Holding plavix (last dose 7/9/2020)  3. Cangrelor until CABG given significant LM disease  4. Echo  6.  Anticipate CABG 7/13/2020; sooner if symptoms    Taya Herrera APRN-CNP  [facility]  [phone]

## 2020-07-11 NOTE — PLAN OF CARE
Open heart binder given to patient reviewed pre and post op expectations.  Encouraged use of IS preop and post op

## 2020-07-11 NOTE — PROGRESS NOTES
Hospitalist Progress Note      PCP: Jeremy Mena MD    Date of Admission: 7/8/2020    Chief Complaint: chest pain    Hospital Course: 80 Y M with a known h/o CAD presented with chest pain. LHC showed severe multivessel disease, including the LM, and CT surgery was consulted for CABG planning. Subjective:  He feels well. No chest pain or dyspnea.       Medications:  Reviewed    Infusion Medications    cangrelor [facility]) infusion 0.75 mcg/kg/min (07/10/20 2331)    [START ON 7/13/2020] insulin (HUMAN R) non-weight based infusion      [START ON 7/13/2020] norepinephrine      [START ON 7/13/2020] lactated ringers      sodium chloride Stopped (07/10/20 1000)     Scheduled Medications    nitroglycerin  0.5 inch Topical 4 times per day    [START ON 7/13/2020] aspirin  81 mg Oral Once    [START ON 7/12/2020] bisacodyl  10 mg Rectal Once    metoprolol succinate  50 mg Oral BID WC    sodium chloride flush  10 mL Intravenous 2 times per day    atorvastatin  80 mg Oral Daily    allopurinol  300 mg Oral Daily    aspirin  81 mg Oral Daily    levothyroxine  50 mcg Oral Daily    primidone  50 mg Oral TID    triamterene-hydroCHLOROthiazide  1 tablet Oral QAM    sodium chloride flush  10 mL Intravenous 2 times per day     PRN Meds: nitroGLYCERIN, morphine **OR** morphine, LORazepam, perflutren lipid microspheres, sodium chloride flush, acetaminophen, magnesium sulfate, potassium chloride **OR** potassium alternative oral replacement **OR** potassium chloride, sodium chloride flush, acetaminophen **OR** acetaminophen, polyethylene glycol, promethazine **OR** ondansetron, melatonin      Intake/Output Summary (Last 24 hours) at 7/11/2020 1141  Last data filed at 7/11/2020 0935  Gross per 24 hour   Intake 440 ml   Output 2300 ml   Net -1860 ml       Physical Exam Performed:    /66   Pulse 71   Temp 97.5 °F (36.4 °C) (Oral)   Resp 16   Ht 5' 9\" (1.753 m)   Wt 196 lb 3.2 oz (89 kg)   SpO2 97% BMI 28.97 kg/m²     General appearance: No apparent distress, appears stated age and cooperative. HEENT: Pupils equal, round, and reactive to light. Conjunctivae/corneas clear. Neck: Supple, with full range of motion. No jugular venous distention. Trachea midline. Respiratory:  Normal respiratory effort. Clear to auscultation, bilaterally without Rales/Wheezes/Rhonchi. Cardiovascular: Regular rate and rhythm with normal S1/S2 without rubs or gallops. 3/6 systolic murmur at the LSB. Abdomen: Soft, non-tender, non-distended with normal bowel sounds. Musculoskeletal: No clubbing, cyanosis or edema bilaterally. Full range of motion without deformity. Skin: Skin color, texture, turgor normal.  No rashes or lesions. Neurologic:  Neurovascularly intact without any focal sensory/motor deficits. Cranial nerves: II-XII intact, grossly non-focal.  Psychiatric: Alert and oriented, thought content appropriate, normal insight  Capillary Refill: Brisk,< 3 seconds   Peripheral Pulses: +2 palpable, equal bilaterally       Labs:   Recent Labs     07/08/20  1646 07/09/20  0737 07/10/20  0702   WBC 10.7 9.0 8.6   HGB 15.6 13.0* 13.7   HCT 44.7 38.0* 39.2*   * 103* 97*     Recent Labs     07/08/20  1646 07/09/20  0737 07/10/20  0702    140 138   K 3.7 3.3* 3.5    106 105   CO2 25 24 23   BUN 21* 22* 10   CREATININE 1.0 0.8 0.7*   CALCIUM 9.5 8.6 8.7     Recent Labs     07/08/20  1646   AST 38*   ALT 42*   BILITOT 0.9   ALKPHOS 69     No results for input(s): INR in the last 72 hours.   Recent Labs     07/08/20  1646 07/08/20  1924 07/08/20  2139   TROPONINI 0.03* 0.05* 0.04*       Urinalysis:      Lab Results   Component Value Date    NITRU Negative 07/09/2020    BLOODU Negative 07/09/2020    SPECGRAV 1.010 07/09/2020    GLUCOSEU Negative 07/09/2020       Radiology:  CT CHEST WO CONTRAST   Final Result   Coronary artery disease with athero sclerotic change seen in the ascending,   and descending thoracic aorta. Aortic valve calcification is seen. No   aortic aneurysm. XR CHEST PORTABLE   Final Result   Stable portable study. VL DUP CAROTID BILATERAL    (Results Pending)   VL PRE OP VEIN MAPPING    (Results Pending)           Assessment/Plan:    Active Hospital Problems    Diagnosis    Chest pain [R07.9]    Unstable angina ([address] [I20.0]       80 Y M with a known h/o CAD presented with chest pain. LHC showed severe multivessel disease, including the LM, and CT surgery was consulted for CABG planning. Unstable angina, with severe disease of the LM, LAD and diagonals, LCx and OM  - Patient is in unusually good shape for his age and comorbidities. Plan is for CABG early next week after clopidogrel washout.  - aspirin, statin, metoprolol    Significant systolic murmur  - TTE was benign a year ago, f/u repeat. HTN  - HCTZ+triamterene (hold morning of surgery), metoprolol     HLD  - continue home statin     Hypothyroidism  - continue home synthroid     Gout  - no flare noted  - continue allopurinol      DVT Prophylaxis: on heparin gtt for now  Diet: DIET CARDIAC; Diet NPO Effective Now Exceptions are: Sips with Meds  Diet NPO Time Specified Exceptions are: Sips with Meds  Code Status: Full Code    PT/OT Eval Status: order post-op    Dispo - perhaps 7/18 or later, pending post-op course. He lives at home with his wife who has dementia.       Ebony Cronin MD

## 2020-07-11 NOTE — PLAN OF CARE
Problem: Falls - Risk of:  Goal: Will remain free from falls  Description: Will remain free from falls  7/10/2020 2204 by Augusta Aguilera RN  Note: Pt will remain free of falls this shift. Bedside table and call light within reach, bed alarm in place. Pt instructed to call out when in need of assistance, verbalized understanding. Will continue to monitor.

## 2020-07-12 LAB
ANION GAP SERPL CALCULATED.3IONS-SCNC: 12 MMOL/L (ref 3–16)
APTT: 31 SEC (ref 24.2–36.2)
BUN BLDV-MCNC: 18 MG/DL (ref 7–20)
CALCIUM SERPL-MCNC: 9 MG/DL (ref 8.3–10.6)
CHLORIDE BLD-SCNC: 105 MMOL/L (ref 99–110)
CO2: 22 MMOL/L (ref 21–32)
CREAT SERPL-MCNC: 0.8 MG/DL (ref 0.8–1.3)
GFR AFRICAN AMERICAN: >60
GFR NON-AFRICAN AMERICAN: >60
GLUCOSE BLD-MCNC: 126 MG/DL (ref 70–99)
HCT VFR BLD CALC: 39.9 % (ref 40.5–52.5)
HEMOGLOBIN: 13.9 G/DL (ref 13.5–17.5)
MCH RBC QN AUTO: 31.6 PG (ref 26–34)
MCHC RBC AUTO-ENTMCNC: 34.9 G/DL (ref 31–36)
MCV RBC AUTO: 90.4 FL (ref 80–100)
PDW BLD-RTO: 14.2 % (ref 12.4–15.4)
PLATELET # BLD: 109 K/UL (ref 135–450)
PMV BLD AUTO: 9.8 FL (ref 5–10.5)
POTASSIUM SERPL-SCNC: 3.9 MMOL/L (ref 3.5–5.1)
RBC # BLD: 4.41 M/UL (ref 4.2–5.9)
SODIUM BLD-SCNC: 139 MMOL/L (ref 136–145)
WBC # BLD: 8.7 K/UL (ref 4–11)

## 2020-07-12 PROCEDURE — 6360000002 HC RX W HCPCS: Performed by: THORACIC SURGERY (CARDIOTHORACIC VASCULAR SURGERY)

## 2020-07-12 PROCEDURE — 6370000000 HC RX 637 (ALT 250 FOR IP): Performed by: INTERNAL MEDICINE

## 2020-07-12 PROCEDURE — 6370000000 HC RX 637 (ALT 250 FOR IP): Performed by: NURSE PRACTITIONER

## 2020-07-12 PROCEDURE — 36415 COLL VENOUS BLD VENIPUNCTURE: CPT

## 2020-07-12 PROCEDURE — 85730 THROMBOPLASTIN TIME PARTIAL: CPT

## 2020-07-12 PROCEDURE — 80048 BASIC METABOLIC PNL TOTAL CA: CPT

## 2020-07-12 PROCEDURE — 99024 POSTOP FOLLOW-UP VISIT: CPT | Performed by: THORACIC SURGERY (CARDIOTHORACIC VASCULAR SURGERY)

## 2020-07-12 PROCEDURE — 85027 COMPLETE CBC AUTOMATED: CPT

## 2020-07-12 PROCEDURE — 2060000000 HC ICU INTERMEDIATE R&B

## 2020-07-12 PROCEDURE — 2580000003 HC RX 258: Performed by: THORACIC SURGERY (CARDIOTHORACIC VASCULAR SURGERY)

## 2020-07-12 PROCEDURE — 99232 SBSQ HOSP IP/OBS MODERATE 35: CPT | Performed by: NURSE PRACTITIONER

## 2020-07-12 PROCEDURE — C9460 INJECTION, CANGRELOR: HCPCS | Performed by: THORACIC SURGERY (CARDIOTHORACIC VASCULAR SURGERY)

## 2020-07-12 RX ORDER — CHLORHEXIDINE GLUCONATE 0.12 MG/ML
15 RINSE ORAL ONCE
Status: COMPLETED | OUTPATIENT
Start: 2020-07-13 | End: 2020-07-13

## 2020-07-12 RX ORDER — CHLORHEXIDINE GLUCONATE 4 G/100ML
SOLUTION TOPICAL 2 TIMES DAILY
Status: COMPLETED | OUTPATIENT
Start: 2020-07-12 | End: 2020-07-13

## 2020-07-12 RX ORDER — TRIAMTERENE AND HYDROCHLOROTHIAZIDE 37.5; 25 MG/1; MG/1
1 TABLET ORAL EVERY MORNING
Status: DISCONTINUED | OUTPATIENT
Start: 2020-07-15 | End: 2020-07-13

## 2020-07-12 RX ADMIN — PRIMIDONE 50 MG: 50 TABLET ORAL at 15:17

## 2020-07-12 RX ADMIN — NITROGLYCERIN 0.5 INCH: 20 OINTMENT TOPICAL at 09:52

## 2020-07-12 RX ADMIN — LORAZEPAM 0.5 MG: 0.5 TABLET ORAL at 17:51

## 2020-07-12 RX ADMIN — METOPROLOL SUCCINATE 50 MG: 50 TABLET, EXTENDED RELEASE ORAL at 17:51

## 2020-07-12 RX ADMIN — ALLOPURINOL 300 MG: 300 TABLET ORAL at 07:59

## 2020-07-12 RX ADMIN — ANTISEPTIC SURGICAL SCRUB: 0.04 SOLUTION TOPICAL at 20:26

## 2020-07-12 RX ADMIN — MUPIROCIN: 20 OINTMENT TOPICAL at 20:26

## 2020-07-12 RX ADMIN — LEVOTHYROXINE SODIUM 50 MCG: 0.05 TABLET ORAL at 07:59

## 2020-07-12 RX ADMIN — NITROGLYCERIN 0.5 INCH: 20 OINTMENT TOPICAL at 13:41

## 2020-07-12 RX ADMIN — METOPROLOL SUCCINATE 50 MG: 50 TABLET, EXTENDED RELEASE ORAL at 07:59

## 2020-07-12 RX ADMIN — PRIMIDONE 50 MG: 50 TABLET ORAL at 07:59

## 2020-07-12 RX ADMIN — ACETAMINOPHEN 650 MG: 325 TABLET ORAL at 17:51

## 2020-07-12 RX ADMIN — CANGRELOR 0.75 MCG/KG/MIN: 50 INJECTION, POWDER, LYOPHILIZED, FOR SOLUTION INTRAVENOUS at 06:00

## 2020-07-12 RX ADMIN — LORAZEPAM 0.5 MG: 0.5 TABLET ORAL at 07:57

## 2020-07-12 RX ADMIN — TRIAMTERENE AND HYDROCHLOROTHIAZIDE 1 TABLET: 37.5; 25 TABLET ORAL at 07:59

## 2020-07-12 RX ADMIN — PRIMIDONE 50 MG: 50 TABLET ORAL at 20:26

## 2020-07-12 RX ADMIN — ASPIRIN 81 MG 81 MG: 81 TABLET ORAL at 07:59

## 2020-07-12 RX ADMIN — ATORVASTATIN CALCIUM 80 MG: 80 TABLET, FILM COATED ORAL at 07:59

## 2020-07-12 ASSESSMENT — PAIN SCALES - GENERAL
PAINLEVEL_OUTOF10: 0
PAINLEVEL_OUTOF10: 0
PAINLEVEL_OUTOF10: 5
PAINLEVEL_OUTOF10: 6
PAINLEVEL_OUTOF10: 0

## 2020-07-12 NOTE — PROGRESS NOTES
[address]   Daily Progress Note    Admit Date:  7/8/2020  HPI:    Chief Complaint   Patient presents with    Chest Pain     Symptoms for 1 month worsening for the past 2 days. Hx of cardiac stents. Advised by cardiologist to go to the ER. Interval history: Marlon Carreon  admitted 7/8/2020 for chest pain. Troponin elevated. [facility] 7/9/2020 showed severe LM disease, referred to CT surgery. Plan for CABG 7/13/2020. Subjective:  Mr. Xavier Rosas denies any chest pain. Feels good. Concerned about rehab post surgery.    No shortness of breath     Objective:   /71   Pulse 71   Temp 97.8 °F (36.6 °C) (Oral)   Resp 16   Ht 5' 9\" (1.753 m)   Wt 196 lb 3.2 oz (89 kg)   SpO2 94%   BMI 28.97 kg/m²       Intake/Output Summary (Last 24 hours) at 7/12/2020 0745  Last data filed at 7/11/2020 2346  Gross per 24 hour   Intake 450 ml   Output 350 ml   Net 100 ml       NYHA: III    Physical Exam:  General:  Awake, alert, NAD  Skin:  Warm and dry  Neck:  JVD<8  Chest:  Clear to auscultation, no wheezes/rhonchi/rales  Cardiovascular:  RRR S1S2, no m/r/g  Tele: NSR rate 70's, intermittent LBBB   Abdomen:  Soft, nontender, +bowel sounds  Extremities:  No ble  edema    Medications:    nitroglycerin  0.5 inch Topical 4 times per day    [START ON 7/13/2020] aspirin  81 mg Oral Once    bisacodyl  10 mg Rectal Once    metoprolol succinate  50 mg Oral BID WC    sodium chloride flush  10 mL Intravenous 2 times per day    atorvastatin  80 mg Oral Daily    allopurinol  300 mg Oral Daily    aspirin  81 mg Oral Daily    levothyroxine  50 mcg Oral Daily    primidone  50 mg Oral TID    triamterene-hydroCHLOROthiazide  1 tablet Oral QAM    sodium chloride flush  10 mL Intravenous 2 times per day      [facility]) infusion 0.75 mcg/kg/min (07/12/20 0600)    [START ON 7/13/2020] insulin (HUMAN R) non-weight based infusion      [START ON 7/13/2020] norepinephrine      [START ON 7/13/2020] lactated ringers      sodium chloride Stopped (07/10/20 1000)       Lab Data:  CBC:   Recent Labs     07/10/20  0702 07/12/20  0435   WBC 8.6 8.7   HGB 13.7 13.9   PLT 97* 109*     BMP:    Recent Labs     07/10/20  0702 07/12/20  0435    139   K 3.5 3.9   CO2 23 22   BUN 10 18   CREATININE 0.7* 0.8     INR:  No results for input(s): INR in the last 72 hours. BNP:  No results for input(s): PROBNP in the last 72 hours. Lab Results   Component Value Date    LVEF 58 07/17/2019    LVEFMODE Cardiac Cath 07/16/2019     Coronary angiogram 7/9/2020:  Unstable angina  PROCEDURES PERFORMED    Left heart catheterization  LVgram  Coronary angiogam  Coronary cath  PROCEDURE DESCRIPTION   Risks/benefits/alternatives/outcomes were discussed with patient and/or family and informed consent was obtained.  Using the [place] scale, the patient's right radial artery was found to be a level B.  Patient was prepped draped in the usual sterile fashion.  Local anaesthetic was applied over puncture site. Chryl Hedge a back wall technique, a 6 [place] Terumo sheath was inserted into right radial artery.  Verapamil, nitroglycerin, nicardipine were administered through the sheath.  Heparin was administered.  Diagnostic 5 [place] Medtronic pigtail and ultra catheters were used for diagnostic angiograms.  At the conclusion of the procedure, a TR band was placed over the puncture site and hemostasis was obtained. Justice Sos were no immediate complications. I supervised sedation with versed 2 mg/fentanyl 50 Mcg during the procedure.  100 cc contrast was utilized. <20cc EBL.   FINDINGS      LVEDP  15   GRADIENT ACROSS AORTIC VALVE  <10mm HG   LV FUNCTION EF 60 %   WALL MOTION  normal   MITRAL REGURGITATION  mild      LM  Proximal-mid 10 to 20% stenosis with distal 90% stenosis that extends into both the LAD and circumflex, calcified vessel.         LAD  There is a stent in the ostium of the LAD that extends to the proximal and mid segments, there is 99% ostial/proximal stenosis with mid 60% stenosis and distal 60% stenosis.     D1, D2, D3 have diffuse disease with D1 having proximal 60% stenosis and D2 and D3 have ostial 90% stenoses, these are small diagonal branches.         LCX Ostial/proximal 90% stenosis, calcified vessel, proximal 30 to 40% stenosis followed by mid-distal 60 to 70% stenosis that extends into the first obtuse marginal.  There is a bifurcation point distally in this vessel and the lower branch has ostial 70 to 80% stenosis.       RCA  Dominant, calcified, ostial/proximal 40 to 50% stenosis.  Mid-distal 30% stenosis.  Vessel is calcified, PDA and PLV have [id number]-emm-[id number] 20 to 30% stenoses.      CONCLUSIONS:    Severe left main/LAD/circumflex disease  Refer for CABG  Continue heparin drip, hold Plavix in anticipation of surgery, will consider bridging with Integrilin/cangrelor pending removal of TR band.     Echo 7/17/2019:  Normal left ventricular systolic function with ejection fraction of 55-60%.   No regional wall motion abnormalites are seen.   Grade I diastolic dysfunction with normal filling pressure. Mild aortic   stenosis.   Mild aortic regurgitation.   Systolic pulmonic artery pressure (SPAP) is normal estimated at 23 mmHg   (Right atrial pressure of 3 mmHg). Active Problems:    Unstable angina (HCC)    Chest pain  Resolved Problems:    * No resolved hospital problems.  *      Assessment/Plan:    Unstable angina  CAD: awaiting CABG, significant LM disease on angiogram 7/9/2020; s/p orbital atherectomy and BRET x2 in 2016 and repeat in 2019 with BRET x3  HTN: better controlled  HLD: controlled, LDL 37, continue statin  Right radial hematoma: improved    Plan:  Echo results pending  On cangrelor until CABG  Continue aspirin, statin and nitro, BB  Holding plavix- last dose 7-9-20  CABG in am per CTS team     Lexi Villa CNP, 7/12/2020, 7:59 AM

## 2020-07-12 NOTE — PROGRESS NOTES
Hospitalist Progress Note      PCP: Rachna Rodriguez MD    Date of Admission: 7/8/2020    Chief Complaint: chest pain    Hospital Course: 80 Y M with a known h/o CAD presented with chest pain. LHC showed severe multivessel disease, including the LM, and CT surgery was consulted for CABG planning. Subjective:  He feels well. No chest pain or dyspnea. In good spirits.        Medications:  Reviewed    Infusion Medications    cangrelor [facility]) infusion 0.75 mcg/kg/min (07/12/20 0600)    [START ON 7/13/2020] insulin (HUMAN R) non-weight based infusion      [START ON 7/13/2020] norepinephrine      [START ON 7/13/2020] lactated ringers      sodium chloride Stopped (07/10/20 1000)     Scheduled Medications    nitroglycerin  0.5 inch Topical 4 times per day    [START ON 7/13/2020] aspirin  81 mg Oral Once    bisacodyl  10 mg Rectal Once    metoprolol succinate  50 mg Oral BID WC    sodium chloride flush  10 mL Intravenous 2 times per day    atorvastatin  80 mg Oral Daily    allopurinol  300 mg Oral Daily    aspirin  81 mg Oral Daily    levothyroxine  50 mcg Oral Daily    primidone  50 mg Oral TID    triamterene-hydroCHLOROthiazide  1 tablet Oral QAM    sodium chloride flush  10 mL Intravenous 2 times per day     PRN Meds: nitroGLYCERIN, morphine **OR** morphine, LORazepam, perflutren lipid microspheres, sodium chloride flush, acetaminophen, magnesium sulfate, potassium chloride **OR** potassium alternative oral replacement **OR** potassium chloride, sodium chloride flush, acetaminophen **OR** acetaminophen, polyethylene glycol, promethazine **OR** ondansetron, melatonin      Intake/Output Summary (Last 24 hours) at 7/12/2020 1026  Last data filed at 7/12/2020 0823  Gross per 24 hour   Intake 891 ml   Output 500 ml   Net 391 ml       Physical Exam Performed:    /76   Pulse 66   Temp 98 °F (36.7 °C) (Oral)   Resp 18   Ht 5' 9\" (1.753 m)   Wt 198 lb 8 oz (90 kg)   SpO2 93%   BMI 29.31 kg/m²     General appearance: No apparent distress, appears stated age and cooperative. HEENT: Pupils equal, round, and reactive to light. Conjunctivae/corneas clear. Neck: Supple, with full range of motion. No jugular venous distention. Trachea midline. Respiratory:  Normal respiratory effort. Clear to auscultation, bilaterally without Rales/Wheezes/Rhonchi. Cardiovascular: Regular rate and rhythm with normal S1/S2 without rubs or gallops. 3/6 systolic murmur at the LSB. Abdomen: Soft, non-tender, non-distended with normal bowel sounds. Musculoskeletal: No clubbing, cyanosis or edema bilaterally. Full range of motion without deformity. Skin: Skin color, texture, turgor normal.  No rashes or lesions. Neurologic:  Neurovascularly intact without any focal sensory/motor deficits. Cranial nerves: II-XII intact, grossly non-focal.  Psychiatric: Alert and oriented, thought content appropriate, normal insight  Capillary Refill: Brisk,< 3 seconds   Peripheral Pulses: +2 palpable, equal bilaterally       Labs:   Recent Labs     07/10/20  0702 07/12/20  0435   WBC 8.6 8.7   HGB 13.7 13.9   HCT 39.2* 39.9*   PLT 97* 109*     Recent Labs     07/10/20  0702 07/12/20  0435    139   K 3.5 3.9    105   CO2 23 22   BUN 10 18   CREATININE 0.7* 0.8   CALCIUM 8.7 9.0     No results for input(s): AST, ALT, BILIDIR, BILITOT, ALKPHOS in the last 72 hours. No results for input(s): INR in the last 72 hours. No results for input(s): Violet Tomekas in the last 72 hours. Urinalysis:      Lab Results   Component Value Date    NITRU Negative 07/09/2020    BLOODU Negative 07/09/2020    SPECGRAV 1.010 07/09/2020    GLUCOSEU Negative 07/09/2020       Radiology:  VL PRE OP VEIN MAPPING   Final Result      VL DUP CAROTID BILATERAL   Final Result      CT CHEST WO CONTRAST   Final Result   Coronary artery disease with athero sclerotic change seen in the ascending,   and descending thoracic aorta.   Aortic valve calcification is seen. No   aortic aneurysm. XR CHEST PORTABLE   Final Result   Stable portable study. Assessment/Plan:    Active Hospital Problems    Diagnosis    Chest pain [R07.9]    Unstable angina ([address] [I20.0]       80 Y M with a known h/o CAD presented with chest pain. LHC showed severe multivessel disease, including the LM, and CT surgery was consulted for CABG planning. Unstable angina, with severe disease of the LM, LAD and diagonals, LCx and OM  - Patient is in unusually good shape for his age and comorbidities. Plan is for CABG early next week after clopidogrel washout.  - aspirin, statin, metoprolol  - also cangrelor gtt per cardiology    Significant systolic murmur  - TTE was benign a year ago, f/u repeat. HTN  - HCTZ+triamterene (hold morning of surgery), metoprolol     HLD  - continue home statin     Hypothyroidism  - continue home synthroid     Gout  - no flare noted  - continue allopurinol      DVT Prophylaxis: on heparin gtt for now  Diet: DIET CARDIAC; Diet NPO Effective Now Exceptions are: Sips with Meds  Diet NPO Time Specified Exceptions are: Sips with Meds  Code Status: Full Code    PT/OT Eval Status: order post-op    Dispo - perhaps 7/18 or later, pending post-op course. He lives at home with his wife who has dementia.       Alverto Perez MD

## 2020-07-12 NOTE — FLOWSHEET NOTE
responded to consult for pre-procedure prayer;   procedure occurring in the morning, patient somewhat anxious.     Patient also concerned about his wife who is exhibiting some signs of memory loss,  Spouse is worried about patient, family making adjustments, expecting 3 great grandchildren born this year.     Prayer as requested, for nurses and doctors, for wisdom to make it out.  Eda Silva  [id number]       07/12/20 2456   Encounter Summary   Services provided to: Patient and family together   Referral/Consult From: Nurse   Support System Children;Family members   Continue Visiting   (7/12: pre procedure prayer, anxious about surgery, son prese)   Complexity of Encounter Moderate   Length of Encounter 30 minutes   Spiritual Assessment Completed Yes   Routine   Type Initial   Assessment Anxious; Coping; Hopeful;Grieving   Intervention Active listening;Prayer   Outcome Expressed gratitude; Acceptance;Comfort

## 2020-07-12 NOTE — PROGRESS NOTES
zpost cath assessment:Has motion,warmth, brisk cap refill, full and = sensation, plus 2 radial pulses of bue. Cath site soft w/ no drainage or hematoma noted.

## 2020-07-13 ENCOUNTER — ANESTHESIA (OUTPATIENT)
Dept: OPERATING ROOM | Age: 85
DRG: 234 | End: 2020-07-13
Payer: MEDICARE

## 2020-07-13 ENCOUNTER — APPOINTMENT (OUTPATIENT)
Dept: GENERAL RADIOLOGY | Age: 85
DRG: 234 | End: 2020-07-13
Payer: MEDICARE

## 2020-07-13 VITALS — OXYGEN SATURATION: 100 % | SYSTOLIC BLOOD PRESSURE: 103 MMHG | DIASTOLIC BLOOD PRESSURE: 44 MMHG | TEMPERATURE: 98 F

## 2020-07-13 PROBLEM — I25.110 CORONARY ARTERY DISEASE INVOLVING NATIVE CORONARY ARTERY OF NATIVE HEART WITH UNSTABLE ANGINA PECTORIS (HCC): Status: ACTIVE | Noted: 2019-07-10

## 2020-07-13 LAB
ACTIVATED CLOTTING TIME: 121 SEC (ref 99–130)
ACTIVATED CLOTTING TIME: 130 SEC (ref 99–130)
ACTIVATED CLOTTING TIME: 188 SEC (ref 99–130)
ACTIVATED CLOTTING TIME: 422 SEC (ref 99–130)
ACTIVATED CLOTTING TIME: 455 SEC (ref 99–130)
ACTIVATED CLOTTING TIME: 480 SEC (ref 99–130)
ACTIVATED CLOTTING TIME: 528 SEC (ref 99–130)
ACTIVATED CLOTTING TIME: 563 SEC (ref 99–130)
ACTIVATED CLOTTING TIME: 844 SEC (ref 99–130)
ALBUMIN SERPL-MCNC: 3.6 G/DL (ref 3.4–5)
ALP BLD-CCNC: 58 U/L (ref 40–129)
ALT SERPL-CCNC: 43 U/L (ref 10–40)
ANION GAP SERPL CALCULATED.3IONS-SCNC: 11 MMOL/L (ref 3–16)
ANION GAP SERPL CALCULATED.3IONS-SCNC: 13 MMOL/L (ref 3–16)
APTT: 30.8 SEC (ref 24.2–36.2)
APTT: 35.2 SEC (ref 24.2–36.2)
AST SERPL-CCNC: 38 U/L (ref 15–37)
BASE EXCESS ARTERIAL: -2 (ref -3–3)
BASE EXCESS ARTERIAL: -4 (ref -3–3)
BASE EXCESS ARTERIAL: 0 (ref -3–3)
BASE EXCESS ARTERIAL: 0 (ref -3–3)
BASE EXCESS ARTERIAL: 1 (ref -3–3)
BASOPHILS ABSOLUTE: 0.1 K/UL (ref 0–0.2)
BASOPHILS RELATIVE PERCENT: 1 %
BILIRUB SERPL-MCNC: 0.7 MG/DL (ref 0–1)
BILIRUBIN DIRECT: <0.2 MG/DL (ref 0–0.3)
BILIRUBIN, INDIRECT: ABNORMAL MG/DL (ref 0–1)
BLOOD BANK DISPENSE STATUS: NORMAL
BLOOD BANK PRODUCT CODE: NORMAL
BPU ID: NORMAL
BUN BLDV-MCNC: 11 MG/DL (ref 7–20)
BUN BLDV-MCNC: 16 MG/DL (ref 7–20)
CALCIUM IONIZED: 0.91 MMOL/L (ref 1.12–1.32)
CALCIUM IONIZED: 1 MMOL/L (ref 1.12–1.32)
CALCIUM IONIZED: 1.03 MMOL/L (ref 1.12–1.32)
CALCIUM IONIZED: 1.05 MMOL/L (ref 1.12–1.32)
CALCIUM IONIZED: 1.05 MMOL/L (ref 1.12–1.32)
CALCIUM IONIZED: 1.07 MMOL/L (ref 1.12–1.32)
CALCIUM IONIZED: 1.1 MMOL/L (ref 1.12–1.32)
CALCIUM IONIZED: 1.16 MMOL/L (ref 1.12–1.32)
CALCIUM IONIZED: 1.19 MMOL/L (ref 1.12–1.32)
CALCIUM SERPL-MCNC: 7.5 MG/DL (ref 8.3–10.6)
CALCIUM SERPL-MCNC: 8.9 MG/DL (ref 8.3–10.6)
CHLORIDE BLD-SCNC: 102 MMOL/L (ref 99–110)
CHLORIDE BLD-SCNC: 104 MMOL/L (ref 99–110)
CHOLESTEROL, TOTAL: 78 MG/DL (ref 0–199)
CO2: 20 MMOL/L (ref 21–32)
CO2: 22 MMOL/L (ref 21–32)
CREAT SERPL-MCNC: 0.6 MG/DL (ref 0.8–1.3)
CREAT SERPL-MCNC: 0.8 MG/DL (ref 0.8–1.3)
DESCRIPTION BLOOD BANK: NORMAL
EOSINOPHILS ABSOLUTE: 1.7 K/UL (ref 0–0.6)
EOSINOPHILS RELATIVE PERCENT: 18.4 %
ESTIMATED AVERAGE GLUCOSE: 114 MG/DL
GFR AFRICAN AMERICAN: >60
GFR AFRICAN AMERICAN: >60
GFR NON-AFRICAN AMERICAN: >60
GFR NON-AFRICAN AMERICAN: >60
GLUCOSE BLD-MCNC: 119 MG/DL (ref 70–99)
GLUCOSE BLD-MCNC: 122 MG/DL (ref 70–99)
GLUCOSE BLD-MCNC: 128 MG/DL (ref 70–99)
GLUCOSE BLD-MCNC: 128 MG/DL (ref 70–99)
GLUCOSE BLD-MCNC: 130 MG/DL (ref 70–99)
GLUCOSE BLD-MCNC: 131 MG/DL (ref 70–99)
GLUCOSE BLD-MCNC: 131 MG/DL (ref 70–99)
GLUCOSE BLD-MCNC: 132 MG/DL (ref 70–99)
GLUCOSE BLD-MCNC: 135 MG/DL (ref 70–99)
GLUCOSE BLD-MCNC: 136 MG/DL (ref 70–99)
GLUCOSE BLD-MCNC: 137 MG/DL (ref 70–99)
GLUCOSE BLD-MCNC: 144 MG/DL (ref 70–99)
GLUCOSE BLD-MCNC: 164 MG/DL (ref 70–99)
GLUCOSE BLD-MCNC: 174 MG/DL (ref 70–99)
GLUCOSE BLD-MCNC: 189 MG/DL (ref 70–99)
HBA1C MFR BLD: 5.6 %
HCO3 ARTERIAL: 21.2 MMOL/L (ref 21–29)
HCO3 ARTERIAL: 21.9 MMOL/L (ref 21–29)
HCO3 ARTERIAL: 21.9 MMOL/L (ref 21–29)
HCO3 ARTERIAL: 22 MMOL/L (ref 21–29)
HCO3 ARTERIAL: 23 MMOL/L (ref 21–29)
HCO3 ARTERIAL: 23.4 MMOL/L (ref 21–29)
HCO3 ARTERIAL: 24.4 MMOL/L (ref 21–29)
HCO3 ARTERIAL: 25.4 MMOL/L (ref 21–29)
HCO3 ARTERIAL: 25.5 MMOL/L (ref 21–29)
HCO3 ARTERIAL: 25.5 MMOL/L (ref 21–29)
HCO3 ARTERIAL: 25.6 MMOL/L (ref 21–29)
HCT VFR BLD CALC: 31.7 % (ref 40.5–52.5)
HCT VFR BLD CALC: 37.6 % (ref 40.5–52.5)
HCT VFR BLD CALC: 40.9 % (ref 40.5–52.5)
HDLC SERPL-MCNC: 25 MG/DL (ref 40–60)
HEMOGLOBIN: 10.9 G/DL (ref 13.5–17.5)
HEMOGLOBIN: 11.2 GM/DL (ref 13.5–17.5)
HEMOGLOBIN: 12.2 GM/DL (ref 13.5–17.5)
HEMOGLOBIN: 12.8 G/DL (ref 13.5–17.5)
HEMOGLOBIN: 14 G/DL (ref 13.5–17.5)
HEMOGLOBIN: 14.2 GM/DL (ref 13.5–17.5)
HEMOGLOBIN: 6.6 GM/DL (ref 13.5–17.5)
HEMOGLOBIN: 7.9 GM/DL (ref 13.5–17.5)
HEMOGLOBIN: 8.1 GM/DL (ref 13.5–17.5)
HEMOGLOBIN: 8.7 GM/DL (ref 13.5–17.5)
HEMOGLOBIN: 8.8 GM/DL (ref 13.5–17.5)
HEMOGLOBIN: 9.6 GM/DL (ref 13.5–17.5)
INR BLD: 1.15 (ref 0.86–1.14)
INR BLD: 1.62 (ref 0.86–1.14)
LACTATE: 1.97 MMOL/L (ref 0.4–2)
LACTATE: 2.07 MMOL/L (ref 0.4–2)
LACTATE: 2.51 MMOL/L (ref 0.4–2)
LACTATE: 2.52 MMOL/L (ref 0.4–2)
LACTATE: 2.68 MMOL/L (ref 0.4–2)
LACTATE: 2.72 MMOL/L (ref 0.4–2)
LACTATE: 3.21 MMOL/L (ref 0.4–2)
LACTATE: 3.4 MMOL/L (ref 0.4–2)
LDL CHOLESTEROL CALCULATED: 32 MG/DL
LYMPHOCYTES ABSOLUTE: 1.2 K/UL (ref 1–5.1)
LYMPHOCYTES RELATIVE PERCENT: 13.7 %
MAGNESIUM: 1.8 MG/DL (ref 1.8–2.4)
MAGNESIUM: 1.8 MG/DL (ref 1.8–2.4)
MCH RBC QN AUTO: 31.2 PG (ref 26–34)
MCH RBC QN AUTO: 31.3 PG (ref 26–34)
MCHC RBC AUTO-ENTMCNC: 34 G/DL (ref 31–36)
MCHC RBC AUTO-ENTMCNC: 34.3 G/DL (ref 31–36)
MCV RBC AUTO: 91.1 FL (ref 80–100)
MCV RBC AUTO: 92.2 FL (ref 80–100)
MONOCYTES ABSOLUTE: 0.6 K/UL (ref 0–1.3)
MONOCYTES RELATIVE PERCENT: 6.2 %
NEUTROPHILS ABSOLUTE: 5.5 K/UL (ref 1.7–7.7)
NEUTROPHILS RELATIVE PERCENT: 60.7 %
O2 SAT, ARTERIAL: 100 % (ref 93–100)
O2 SAT, ARTERIAL: 88 % (ref 93–100)
O2 SAT, ARTERIAL: 94 % (ref 93–100)
O2 SAT, ARTERIAL: 98 % (ref 93–100)
PCO2 ARTERIAL: 33 MM HG (ref 35–45)
PCO2 ARTERIAL: 34.6 MM HG (ref 35–45)
PCO2 ARTERIAL: 38 MM HG (ref 35–45)
PCO2 ARTERIAL: 38.3 MM HG (ref 35–45)
PCO2 ARTERIAL: 38.4 MM HG (ref 35–45)
PCO2 ARTERIAL: 39.6 MM HG (ref 35–45)
PCO2 ARTERIAL: 40.1 MM HG (ref 35–45)
PCO2 ARTERIAL: 40.4 MM HG (ref 35–45)
PCO2 ARTERIAL: 40.7 MM HG (ref 35–45)
PCO2 ARTERIAL: 41.7 MM HG (ref 35–45)
PCO2 ARTERIAL: 43 MM HG (ref 35–45)
PDW BLD-RTO: 14.4 % (ref 12.4–15.4)
PDW BLD-RTO: 14.5 % (ref 12.4–15.4)
PERFORMED ON: ABNORMAL
PH ARTERIAL: 7.35 (ref 7.35–7.45)
PH ARTERIAL: 7.37 (ref 7.35–7.45)
PH ARTERIAL: 7.37 (ref 7.35–7.45)
PH ARTERIAL: 7.38 (ref 7.35–7.45)
PH ARTERIAL: 7.39 (ref 7.35–7.45)
PH ARTERIAL: 7.41 (ref 7.35–7.45)
PH ARTERIAL: 7.41 (ref 7.35–7.45)
PH ARTERIAL: 7.42 (ref 7.35–7.45)
PH ARTERIAL: 7.43 (ref 7.35–7.45)
PLATELET # BLD: 129 K/UL (ref 135–450)
PLATELET # BLD: 85 K/UL (ref 135–450)
PMV BLD AUTO: 9.5 FL (ref 5–10.5)
PMV BLD AUTO: 9.7 FL (ref 5–10.5)
PO2 ARTERIAL: 110.1 MM HG (ref 75–108)
PO2 ARTERIAL: 497.6 MM HG (ref 75–108)
PO2 ARTERIAL: 526.9 MM HG (ref 75–108)
PO2 ARTERIAL: 545.4 MM HG (ref 75–108)
PO2 ARTERIAL: 546.9 MM HG (ref 75–108)
PO2 ARTERIAL: 556 MM HG (ref 75–108)
PO2 ARTERIAL: 56.3 MM HG (ref 75–108)
PO2 ARTERIAL: 561.3 MM HG (ref 75–108)
PO2 ARTERIAL: 564.2 MM HG (ref 75–108)
PO2 ARTERIAL: 581.9 MM HG (ref 75–108)
PO2 ARTERIAL: 71.3 MM HG (ref 75–108)
POC HEMATOCRIT: 19 % (ref 40.5–52.5)
POC HEMATOCRIT: 23 % (ref 40.5–52.5)
POC HEMATOCRIT: 24 % (ref 40.5–52.5)
POC HEMATOCRIT: 26 % (ref 40.5–52.5)
POC HEMATOCRIT: 26 % (ref 40.5–52.5)
POC HEMATOCRIT: 28 % (ref 40.5–52.5)
POC HEMATOCRIT: 33 % (ref 40.5–52.5)
POC HEMATOCRIT: 36 % (ref 40.5–52.5)
POC HEMATOCRIT: 42 % (ref 40.5–52.5)
POC POTASSIUM: 3.6 MMOL/L (ref 3.5–5.1)
POC POTASSIUM: 3.7 MMOL/L (ref 3.5–5.1)
POC POTASSIUM: 3.8 MMOL/L (ref 3.5–5.1)
POC POTASSIUM: 3.9 MMOL/L (ref 3.5–5.1)
POC POTASSIUM: 4.1 MMOL/L (ref 3.5–5.1)
POC SAMPLE TYPE: ABNORMAL
POC SODIUM: 132 MMOL/L (ref 136–145)
POC SODIUM: 135 MMOL/L (ref 136–145)
POC SODIUM: 136 MMOL/L (ref 136–145)
POC SODIUM: 136 MMOL/L (ref 136–145)
POC SODIUM: 137 MMOL/L (ref 136–145)
POC SODIUM: 138 MMOL/L (ref 136–145)
POC SODIUM: 140 MMOL/L (ref 136–145)
POTASSIUM SERPL-SCNC: 3.7 MMOL/L (ref 3.5–5.1)
POTASSIUM SERPL-SCNC: 4 MMOL/L (ref 3.5–5.1)
POTASSIUM SERPL-SCNC: 4.3 MMOL/L (ref 3.5–5.1)
PROTHROMBIN TIME: 13.4 SEC (ref 10–13.2)
PROTHROMBIN TIME: 18.9 SEC (ref 10–13.2)
RBC # BLD: 4.08 M/UL (ref 4.2–5.9)
RBC # BLD: 4.5 M/UL (ref 4.2–5.9)
SODIUM BLD-SCNC: 135 MMOL/L (ref 136–145)
SODIUM BLD-SCNC: 137 MMOL/L (ref 136–145)
TCO2 ARTERIAL: 22 MMOL/L
TCO2 ARTERIAL: 23 MMOL/L
TCO2 ARTERIAL: 24 MMOL/L
TCO2 ARTERIAL: 25 MMOL/L
TCO2 ARTERIAL: 26 MMOL/L
TCO2 ARTERIAL: 27 MMOL/L
TOTAL PROTEIN: 7 G/DL (ref 6.4–8.2)
TRIGL SERPL-MCNC: 104 MG/DL (ref 0–150)
VLDLC SERPL CALC-MCNC: 21 MG/DL
WBC # BLD: 21.1 K/UL (ref 4–11)
WBC # BLD: 9 K/UL (ref 4–11)

## 2020-07-13 PROCEDURE — 3700000001 HC ADD 15 MINUTES (ANESTHESIA): Performed by: THORACIC SURGERY (CARDIOTHORACIC VASCULAR SURGERY)

## 2020-07-13 PROCEDURE — 71045 X-RAY EXAM CHEST 1 VIEW: CPT

## 2020-07-13 PROCEDURE — C1786 PMKR, SINGLE, RATE-RESP: HCPCS | Performed by: THORACIC SURGERY (CARDIOTHORACIC VASCULAR SURGERY)

## 2020-07-13 PROCEDURE — 85025 COMPLETE CBC W/AUTO DIFF WBC: CPT

## 2020-07-13 PROCEDURE — 3700000000 HC ANESTHESIA ATTENDED CARE: Performed by: THORACIC SURGERY (CARDIOTHORACIC VASCULAR SURGERY)

## 2020-07-13 PROCEDURE — 6360000002 HC RX W HCPCS: Performed by: THORACIC SURGERY (CARDIOTHORACIC VASCULAR SURGERY)

## 2020-07-13 PROCEDURE — 3E0T3BZ INTRODUCTION OF ANESTHETIC AGENT INTO PERIPHERAL NERVES AND PLEXI, PERCUTANEOUS APPROACH: ICD-10-PCS | Performed by: THORACIC SURGERY (CARDIOTHORACIC VASCULAR SURGERY)

## 2020-07-13 PROCEDURE — 85730 THROMBOPLASTIN TIME PARTIAL: CPT

## 2020-07-13 PROCEDURE — 83735 ASSAY OF MAGNESIUM: CPT

## 2020-07-13 PROCEDURE — 33508 ENDOSCOPIC VEIN HARVEST: CPT | Performed by: THORACIC SURGERY (CARDIOTHORACIC VASCULAR SURGERY)

## 2020-07-13 PROCEDURE — 33517 CABG ARTERY-VEIN SINGLE: CPT | Performed by: THORACIC SURGERY (CARDIOTHORACIC VASCULAR SURGERY)

## 2020-07-13 PROCEDURE — 80048 BASIC METABOLIC PNL TOTAL CA: CPT

## 2020-07-13 PROCEDURE — 2500000003 HC RX 250 WO HCPCS: Performed by: THORACIC SURGERY (CARDIOTHORACIC VASCULAR SURGERY)

## 2020-07-13 PROCEDURE — 33533 CABG ARTERIAL SINGLE: CPT | Performed by: THORACIC SURGERY (CARDIOTHORACIC VASCULAR SURGERY)

## 2020-07-13 PROCEDURE — 02HP32Z INSERTION OF MONITORING DEVICE INTO PULMONARY TRUNK, PERCUTANEOUS APPROACH: ICD-10-PCS | Performed by: THORACIC SURGERY (CARDIOTHORACIC VASCULAR SURGERY)

## 2020-07-13 PROCEDURE — 94669 MECHANICAL CHEST WALL OSCILL: CPT

## 2020-07-13 PROCEDURE — 2700000000 HC OXYGEN THERAPY PER DAY

## 2020-07-13 PROCEDURE — 2580000003 HC RX 258: Performed by: THORACIC SURGERY (CARDIOTHORACIC VASCULAR SURGERY)

## 2020-07-13 PROCEDURE — 6360000002 HC RX W HCPCS: Performed by: NURSE PRACTITIONER

## 2020-07-13 PROCEDURE — 83036 HEMOGLOBIN GLYCOSYLATED A1C: CPT

## 2020-07-13 PROCEDURE — 82803 BLOOD GASES ANY COMBINATION: CPT

## 2020-07-13 PROCEDURE — 06BP4ZZ EXCISION OF RIGHT SAPHENOUS VEIN, PERCUTANEOUS ENDOSCOPIC APPROACH: ICD-10-PCS | Performed by: THORACIC SURGERY (CARDIOTHORACIC VASCULAR SURGERY)

## 2020-07-13 PROCEDURE — 82947 ASSAY GLUCOSE BLOOD QUANT: CPT

## 2020-07-13 PROCEDURE — 5A1221Z PERFORMANCE OF CARDIAC OUTPUT, CONTINUOUS: ICD-10-PCS | Performed by: THORACIC SURGERY (CARDIOTHORACIC VASCULAR SURGERY)

## 2020-07-13 PROCEDURE — 85018 HEMOGLOBIN: CPT

## 2020-07-13 PROCEDURE — 6370000000 HC RX 637 (ALT 250 FOR IP): Performed by: ANESTHESIOLOGY

## 2020-07-13 PROCEDURE — 82330 ASSAY OF CALCIUM: CPT

## 2020-07-13 PROCEDURE — 6370000000 HC RX 637 (ALT 250 FOR IP): Performed by: NURSE PRACTITIONER

## 2020-07-13 PROCEDURE — 021109W BYPASS CORONARY ARTERY, TWO ARTERIES FROM AORTA WITH AUTOLOGOUS VENOUS TISSUE, OPEN APPROACH: ICD-10-PCS | Performed by: THORACIC SURGERY (CARDIOTHORACIC VASCULAR SURGERY)

## 2020-07-13 PROCEDURE — 83605 ASSAY OF LACTIC ACID: CPT

## 2020-07-13 PROCEDURE — 2720000010 HC SURG SUPPLY STERILE: Performed by: THORACIC SURGERY (CARDIOTHORACIC VASCULAR SURGERY)

## 2020-07-13 PROCEDURE — 2580000003 HC RX 258: Performed by: NURSE PRACTITIONER

## 2020-07-13 PROCEDURE — 94761 N-INVAS EAR/PLS OXIMETRY MLT: CPT

## 2020-07-13 PROCEDURE — 84295 ASSAY OF SERUM SODIUM: CPT

## 2020-07-13 PROCEDURE — 80061 LIPID PANEL: CPT

## 2020-07-13 PROCEDURE — 36430 TRANSFUSION BLD/BLD COMPNT: CPT

## 2020-07-13 PROCEDURE — B246ZZ4 ULTRASONOGRAPHY OF RIGHT AND LEFT HEART, TRANSESOPHAGEAL: ICD-10-PCS | Performed by: THORACIC SURGERY (CARDIOTHORACIC VASCULAR SURGERY)

## 2020-07-13 PROCEDURE — 2580000003 HC RX 258

## 2020-07-13 PROCEDURE — 85014 HEMATOCRIT: CPT

## 2020-07-13 PROCEDURE — 84132 ASSAY OF SERUM POTASSIUM: CPT

## 2020-07-13 PROCEDURE — 80076 HEPATIC FUNCTION PANEL: CPT

## 2020-07-13 PROCEDURE — 85610 PROTHROMBIN TIME: CPT

## 2020-07-13 PROCEDURE — 6360000002 HC RX W HCPCS: Performed by: ANESTHESIOLOGY

## 2020-07-13 PROCEDURE — 2100000000 HC CCU R&B

## 2020-07-13 PROCEDURE — 3600000018 HC SURGERY OHS ADDTL 15MIN: Performed by: THORACIC SURGERY (CARDIOTHORACIC VASCULAR SURGERY)

## 2020-07-13 PROCEDURE — 7100000011 HC PHASE II RECOVERY - ADDTL 15 MIN

## 2020-07-13 PROCEDURE — 85027 COMPLETE CBC AUTOMATED: CPT

## 2020-07-13 PROCEDURE — 6370000000 HC RX 637 (ALT 250 FOR IP): Performed by: THORACIC SURGERY (CARDIOTHORACIC VASCULAR SURGERY)

## 2020-07-13 PROCEDURE — 02L70CK OCCLUSION OF LEFT ATRIAL APPENDAGE WITH EXTRALUMINAL DEVICE, OPEN APPROACH: ICD-10-PCS | Performed by: THORACIC SURGERY (CARDIOTHORACIC VASCULAR SURGERY)

## 2020-07-13 PROCEDURE — 7100000010 HC PHASE II RECOVERY - FIRST 15 MIN

## 2020-07-13 PROCEDURE — C9290 INJ, BUPIVACAINE LIPOSOME: HCPCS | Performed by: THORACIC SURGERY (CARDIOTHORACIC VASCULAR SURGERY)

## 2020-07-13 PROCEDURE — 2709999900 HC NON-CHARGEABLE SUPPLY: Performed by: THORACIC SURGERY (CARDIOTHORACIC VASCULAR SURGERY)

## 2020-07-13 PROCEDURE — 6360000002 HC RX W HCPCS

## 2020-07-13 PROCEDURE — 2500000003 HC RX 250 WO HCPCS: Performed by: ANESTHESIOLOGY

## 2020-07-13 PROCEDURE — 2580000003 HC RX 258: Performed by: ANESTHESIOLOGY

## 2020-07-13 PROCEDURE — 3600000008 HC SURGERY OHS BASE: Performed by: THORACIC SURGERY (CARDIOTHORACIC VASCULAR SURGERY)

## 2020-07-13 PROCEDURE — 85347 COAGULATION TIME ACTIVATED: CPT

## 2020-07-13 PROCEDURE — 2500000003 HC RX 250 WO HCPCS

## 2020-07-13 RX ORDER — FENTANYL CITRATE 50 UG/ML
INJECTION, SOLUTION INTRAMUSCULAR; INTRAVENOUS PRN
Status: DISCONTINUED | OUTPATIENT
Start: 2020-07-13 | End: 2020-07-13 | Stop reason: SDUPTHER

## 2020-07-13 RX ORDER — DOBUTAMINE HYDROCHLORIDE 200 MG/100ML
2 INJECTION INTRAVENOUS CONTINUOUS PRN
Status: DISCONTINUED | OUTPATIENT
Start: 2020-07-13 | End: 2020-07-15

## 2020-07-13 RX ORDER — NICOTINE POLACRILEX 4 MG
15 LOZENGE BUCCAL PRN
Status: DISCONTINUED | OUTPATIENT
Start: 2020-07-13 | End: 2020-07-19 | Stop reason: HOSPADM

## 2020-07-13 RX ORDER — FUROSEMIDE 10 MG/ML
40 INJECTION INTRAMUSCULAR; INTRAVENOUS 2 TIMES DAILY
Status: DISCONTINUED | OUTPATIENT
Start: 2020-07-14 | End: 2020-07-19 | Stop reason: HOSPADM

## 2020-07-13 RX ORDER — PROTAMINE SULFATE 10 MG/ML
50 INJECTION, SOLUTION INTRAVENOUS
Status: ACTIVE | OUTPATIENT
Start: 2020-07-13 | End: 2020-07-13

## 2020-07-13 RX ORDER — HEPARIN SODIUM 1000 [USP'U]/ML
INJECTION, SOLUTION INTRAVENOUS; SUBCUTANEOUS PRN
Status: DISCONTINUED | OUTPATIENT
Start: 2020-07-13 | End: 2020-07-13 | Stop reason: SDUPTHER

## 2020-07-13 RX ORDER — ASPIRIN 81 MG/1
81 TABLET ORAL DAILY
Status: DISCONTINUED | OUTPATIENT
Start: 2020-07-14 | End: 2020-07-19 | Stop reason: HOSPADM

## 2020-07-13 RX ORDER — MAGNESIUM SULFATE IN WATER 40 MG/ML
2 INJECTION, SOLUTION INTRAVENOUS PRN
Status: DISCONTINUED | OUTPATIENT
Start: 2020-07-13 | End: 2020-07-19 | Stop reason: HOSPADM

## 2020-07-13 RX ORDER — SODIUM CHLORIDE, SODIUM LACTATE, POTASSIUM CHLORIDE, AND CALCIUM CHLORIDE .6; .31; .03; .02 G/100ML; G/100ML; G/100ML; G/100ML
250 INJECTION, SOLUTION INTRAVENOUS CONTINUOUS PRN
Status: DISCONTINUED | OUTPATIENT
Start: 2020-07-13 | End: 2020-07-15

## 2020-07-13 RX ORDER — 0.9 % SODIUM CHLORIDE 0.9 %
250 INTRAVENOUS SOLUTION INTRAVENOUS ONCE
Status: DISCONTINUED | OUTPATIENT
Start: 2020-07-13 | End: 2020-07-13

## 2020-07-13 RX ORDER — CHOLECALCIFEROL (VITAMIN D3) 125 MCG
5 CAPSULE ORAL NIGHTLY PRN
Status: DISCONTINUED | OUTPATIENT
Start: 2020-07-14 | End: 2020-07-19 | Stop reason: HOSPADM

## 2020-07-13 RX ORDER — BISACODYL 10 MG
10 SUPPOSITORY, RECTAL RECTAL DAILY PRN
Status: DISCONTINUED | OUTPATIENT
Start: 2020-07-13 | End: 2020-07-19 | Stop reason: HOSPADM

## 2020-07-13 RX ORDER — POTASSIUM CHLORIDE 750 MG/1
10 TABLET, EXTENDED RELEASE ORAL
Status: DISCONTINUED | OUTPATIENT
Start: 2020-07-14 | End: 2020-07-19 | Stop reason: HOSPADM

## 2020-07-13 RX ORDER — MORPHINE SULFATE 4 MG/ML
4 INJECTION, SOLUTION INTRAMUSCULAR; INTRAVENOUS
Status: DISCONTINUED | OUTPATIENT
Start: 2020-07-13 | End: 2020-07-19 | Stop reason: HOSPADM

## 2020-07-13 RX ORDER — ONDANSETRON 2 MG/ML
4 INJECTION INTRAMUSCULAR; INTRAVENOUS EVERY 8 HOURS PRN
Status: DISCONTINUED | OUTPATIENT
Start: 2020-07-13 | End: 2020-07-19 | Stop reason: HOSPADM

## 2020-07-13 RX ORDER — OXYCODONE HYDROCHLORIDE 5 MG/1
5 TABLET ORAL EVERY 4 HOURS PRN
Status: DISCONTINUED | OUTPATIENT
Start: 2020-07-13 | End: 2020-07-19 | Stop reason: HOSPADM

## 2020-07-13 RX ORDER — NOREPINEPHRINE BITARTRATE 1 MG/ML
INJECTION, SOLUTION INTRAVENOUS PRN
Status: DISCONTINUED | OUTPATIENT
Start: 2020-07-13 | End: 2020-07-13 | Stop reason: SDUPTHER

## 2020-07-13 RX ORDER — KETOROLAC TROMETHAMINE 30 MG/ML
15 INJECTION, SOLUTION INTRAMUSCULAR; INTRAVENOUS EVERY 6 HOURS PRN
Status: DISPENSED | OUTPATIENT
Start: 2020-07-13 | End: 2020-07-15

## 2020-07-13 RX ORDER — LABETALOL HYDROCHLORIDE 5 MG/ML
INJECTION, SOLUTION INTRAVENOUS PRN
Status: DISCONTINUED | OUTPATIENT
Start: 2020-07-13 | End: 2020-07-13 | Stop reason: SDUPTHER

## 2020-07-13 RX ORDER — ACETAMINOPHEN 325 MG/1
650 TABLET ORAL EVERY 6 HOURS
Status: DISPENSED | OUTPATIENT
Start: 2020-07-14 | End: 2020-07-16

## 2020-07-13 RX ORDER — MILRINONE LACTATE 0.2 MG/ML
0.38 INJECTION, SOLUTION INTRAVENOUS CONTINUOUS
Status: DISCONTINUED | OUTPATIENT
Start: 2020-07-13 | End: 2020-07-15

## 2020-07-13 RX ORDER — NITROGLYCERIN 20 MG/100ML
10 INJECTION INTRAVENOUS CONTINUOUS PRN
Status: DISCONTINUED | OUTPATIENT
Start: 2020-07-13 | End: 2020-07-15

## 2020-07-13 RX ORDER — FONDAPARINUX SODIUM 2.5 MG/.5ML
2.5 INJECTION SUBCUTANEOUS DAILY
Status: DISCONTINUED | OUTPATIENT
Start: 2020-07-14 | End: 2020-07-16

## 2020-07-13 RX ORDER — AMIODARONE HYDROCHLORIDE 50 MG/ML
INJECTION, SOLUTION INTRAVENOUS PRN
Status: DISCONTINUED | OUTPATIENT
Start: 2020-07-13 | End: 2020-07-13 | Stop reason: SDUPTHER

## 2020-07-13 RX ORDER — DEXTROSE MONOHYDRATE 25 G/50ML
12.5 INJECTION, SOLUTION INTRAVENOUS PRN
Status: DISCONTINUED | OUTPATIENT
Start: 2020-07-13 | End: 2020-07-19 | Stop reason: HOSPADM

## 2020-07-13 RX ORDER — 0.9 % SODIUM CHLORIDE 0.9 %
20 INTRAVENOUS SOLUTION INTRAVENOUS ONCE
Status: DISCONTINUED | OUTPATIENT
Start: 2020-07-13 | End: 2020-07-16

## 2020-07-13 RX ORDER — SODIUM CHLORIDE, SODIUM LACTATE, POTASSIUM CHLORIDE, CALCIUM CHLORIDE 600; 310; 30; 20 MG/100ML; MG/100ML; MG/100ML; MG/100ML
INJECTION, SOLUTION INTRAVENOUS CONTINUOUS PRN
Status: DISCONTINUED | OUTPATIENT
Start: 2020-07-13 | End: 2020-07-13 | Stop reason: SDUPTHER

## 2020-07-13 RX ORDER — AMINOCAPROIC ACID 250 MG/ML
INJECTION, SOLUTION INTRAVENOUS PRN
Status: DISCONTINUED | OUTPATIENT
Start: 2020-07-13 | End: 2020-07-13 | Stop reason: SDUPTHER

## 2020-07-13 RX ORDER — POLYETHYLENE GLYCOL 3350 17 G/17G
17 POWDER, FOR SOLUTION ORAL DAILY
Status: DISCONTINUED | OUTPATIENT
Start: 2020-07-14 | End: 2020-07-19 | Stop reason: HOSPADM

## 2020-07-13 RX ORDER — MIDAZOLAM HYDROCHLORIDE 1 MG/ML
1 INJECTION INTRAMUSCULAR; INTRAVENOUS
Status: DISCONTINUED | OUTPATIENT
Start: 2020-07-13 | End: 2020-07-15

## 2020-07-13 RX ORDER — ALBUTEROL SULFATE 90 UG/1
2 AEROSOL, METERED RESPIRATORY (INHALATION) EVERY 6 HOURS PRN
Status: DISCONTINUED | OUTPATIENT
Start: 2020-07-13 | End: 2020-07-19 | Stop reason: HOSPADM

## 2020-07-13 RX ORDER — DIPHENHYDRAMINE HCL 25 MG
25 TABLET ORAL NIGHTLY PRN
Status: DISCONTINUED | OUTPATIENT
Start: 2020-07-14 | End: 2020-07-19 | Stop reason: HOSPADM

## 2020-07-13 RX ORDER — MORPHINE SULFATE 2 MG/ML
2 INJECTION, SOLUTION INTRAMUSCULAR; INTRAVENOUS
Status: DISCONTINUED | OUTPATIENT
Start: 2020-07-13 | End: 2020-07-19 | Stop reason: HOSPADM

## 2020-07-13 RX ORDER — SODIUM CHLORIDE, SODIUM LACTATE, POTASSIUM CHLORIDE, CALCIUM CHLORIDE 600; 310; 30; 20 MG/100ML; MG/100ML; MG/100ML; MG/100ML
INJECTION, SOLUTION INTRAVENOUS CONTINUOUS
Status: DISCONTINUED | OUTPATIENT
Start: 2020-07-13 | End: 2020-07-15

## 2020-07-13 RX ORDER — EPHEDRINE SULFATE 50 MG/ML
INJECTION INTRAVENOUS PRN
Status: DISCONTINUED | OUTPATIENT
Start: 2020-07-13 | End: 2020-07-13 | Stop reason: SDUPTHER

## 2020-07-13 RX ORDER — CHLORHEXIDINE GLUCONATE 0.12 MG/ML
15 RINSE ORAL 2 TIMES DAILY
Status: DISCONTINUED | OUTPATIENT
Start: 2020-07-13 | End: 2020-07-19 | Stop reason: HOSPADM

## 2020-07-13 RX ORDER — ACETAMINOPHEN 10 MG/ML
INJECTION, SOLUTION INTRAVENOUS PRN
Status: DISCONTINUED | OUTPATIENT
Start: 2020-07-13 | End: 2020-07-13 | Stop reason: SDUPTHER

## 2020-07-13 RX ORDER — ALBUMIN, HUMAN INJ 5% 5 %
25 SOLUTION INTRAVENOUS PRN
Status: DISCONTINUED | OUTPATIENT
Start: 2020-07-13 | End: 2020-07-15

## 2020-07-13 RX ORDER — SODIUM CHLORIDE 9 MG/ML
INJECTION, SOLUTION INTRAVENOUS CONTINUOUS PRN
Status: DISCONTINUED | OUTPATIENT
Start: 2020-07-13 | End: 2020-07-13 | Stop reason: SDUPTHER

## 2020-07-13 RX ORDER — CLOPIDOGREL BISULFATE 75 MG/1
75 TABLET ORAL DAILY
Status: DISCONTINUED | OUTPATIENT
Start: 2020-07-15 | End: 2020-07-16

## 2020-07-13 RX ORDER — KETOROLAC TROMETHAMINE 30 MG/ML
15 INJECTION, SOLUTION INTRAMUSCULAR; INTRAVENOUS EVERY 6 HOURS
Status: DISCONTINUED | OUTPATIENT
Start: 2020-07-13 | End: 2020-07-13

## 2020-07-13 RX ORDER — NEOSTIGMINE METHYLSULFATE 5 MG/5 ML
SYRINGE (ML) INTRAVENOUS PRN
Status: DISCONTINUED | OUTPATIENT
Start: 2020-07-13 | End: 2020-07-13 | Stop reason: SDUPTHER

## 2020-07-13 RX ORDER — OXYCODONE HYDROCHLORIDE 5 MG/1
10 TABLET ORAL EVERY 4 HOURS PRN
Status: DISCONTINUED | OUTPATIENT
Start: 2020-07-13 | End: 2020-07-19 | Stop reason: HOSPADM

## 2020-07-13 RX ORDER — SODIUM CHLORIDE 0.9 % (FLUSH) 0.9 %
10 SYRINGE (ML) INJECTION EVERY 12 HOURS SCHEDULED
Status: DISCONTINUED | OUTPATIENT
Start: 2020-07-13 | End: 2020-07-19 | Stop reason: HOSPADM

## 2020-07-13 RX ORDER — MIDAZOLAM HYDROCHLORIDE 1 MG/ML
INJECTION INTRAMUSCULAR; INTRAVENOUS PRN
Status: DISCONTINUED | OUTPATIENT
Start: 2020-07-13 | End: 2020-07-13 | Stop reason: SDUPTHER

## 2020-07-13 RX ORDER — INSULIN GLARGINE 100 [IU]/ML
0.15 INJECTION, SOLUTION SUBCUTANEOUS NIGHTLY
Status: DISCONTINUED | OUTPATIENT
Start: 2020-07-14 | End: 2020-07-17

## 2020-07-13 RX ORDER — CALCIUM CHLORIDE 100 MG/ML
INJECTION INTRAVENOUS; INTRAVENTRICULAR PRN
Status: DISCONTINUED | OUTPATIENT
Start: 2020-07-13 | End: 2020-07-13 | Stop reason: SDUPTHER

## 2020-07-13 RX ORDER — KETAMINE HYDROCHLORIDE 100 MG/ML
INJECTION, SOLUTION INTRAMUSCULAR; INTRAVENOUS PRN
Status: DISCONTINUED | OUTPATIENT
Start: 2020-07-13 | End: 2020-07-13 | Stop reason: SDUPTHER

## 2020-07-13 RX ORDER — ATORVASTATIN CALCIUM 80 MG/1
80 TABLET, FILM COATED ORAL NIGHTLY
Status: DISCONTINUED | OUTPATIENT
Start: 2020-07-14 | End: 2020-07-19 | Stop reason: HOSPADM

## 2020-07-13 RX ORDER — POTASSIUM CHLORIDE 29.8 MG/ML
20 INJECTION INTRAVENOUS PRN
Status: DISCONTINUED | OUTPATIENT
Start: 2020-07-13 | End: 2020-07-19 | Stop reason: HOSPADM

## 2020-07-13 RX ORDER — PROTAMINE SULFATE 10 MG/ML
INJECTION, SOLUTION INTRAVENOUS PRN
Status: DISCONTINUED | OUTPATIENT
Start: 2020-07-13 | End: 2020-07-13 | Stop reason: SDUPTHER

## 2020-07-13 RX ORDER — CISATRACURIUM BESYLATE 2 MG/ML
INJECTION, SOLUTION INTRAVENOUS PRN
Status: DISCONTINUED | OUTPATIENT
Start: 2020-07-13 | End: 2020-07-13 | Stop reason: SDUPTHER

## 2020-07-13 RX ORDER — ASPIRIN 300 MG/1
300 SUPPOSITORY RECTAL ONCE
Status: COMPLETED | OUTPATIENT
Start: 2020-07-13 | End: 2020-07-13

## 2020-07-13 RX ORDER — GLYCOPYRROLATE 0.2 MG/ML
INJECTION INTRAMUSCULAR; INTRAVENOUS PRN
Status: DISCONTINUED | OUTPATIENT
Start: 2020-07-13 | End: 2020-07-13 | Stop reason: SDUPTHER

## 2020-07-13 RX ORDER — ETOMIDATE 2 MG/ML
INJECTION INTRAVENOUS PRN
Status: DISCONTINUED | OUTPATIENT
Start: 2020-07-13 | End: 2020-07-13 | Stop reason: SDUPTHER

## 2020-07-13 RX ORDER — NITROGLYCERIN 40 MG/1
1 PATCH TRANSDERMAL DAILY
Status: DISPENSED | OUTPATIENT
Start: 2020-07-14 | End: 2020-07-17

## 2020-07-13 RX ORDER — FUROSEMIDE 10 MG/ML
40 INJECTION INTRAMUSCULAR; INTRAVENOUS
Status: COMPLETED | OUTPATIENT
Start: 2020-07-13 | End: 2020-07-13

## 2020-07-13 RX ORDER — DEXTROSE MONOHYDRATE 50 MG/ML
100 INJECTION, SOLUTION INTRAVENOUS PRN
Status: DISCONTINUED | OUTPATIENT
Start: 2020-07-13 | End: 2020-07-19 | Stop reason: HOSPADM

## 2020-07-13 RX ORDER — SODIUM CHLORIDE 0.9 % (FLUSH) 0.9 %
10 SYRINGE (ML) INJECTION PRN
Status: DISCONTINUED | OUTPATIENT
Start: 2020-07-13 | End: 2020-07-19 | Stop reason: HOSPADM

## 2020-07-13 RX ADMIN — VANCOMYCIN HYDROCHLORIDE 1.5 G: 10 INJECTION, POWDER, LYOPHILIZED, FOR SOLUTION INTRAVENOUS at 07:30

## 2020-07-13 RX ADMIN — CALCIUM CHLORIDE 0.5 G: 100 INJECTION, SOLUTION INTRAVENOUS at 11:13

## 2020-07-13 RX ADMIN — ASPIRIN 300 MG: 300 SUPPOSITORY RECTAL at 18:28

## 2020-07-13 RX ADMIN — FUROSEMIDE 40 MG: 10 INJECTION, SOLUTION INTRAMUSCULAR; INTRAVENOUS at 22:08

## 2020-07-13 RX ADMIN — POTASSIUM CHLORIDE 20 MEQ: 400 INJECTION, SOLUTION INTRAVENOUS at 13:24

## 2020-07-13 RX ADMIN — PROTAMINE SULFATE 50 MG: 10 INJECTION, SOLUTION INTRAVENOUS at 12:00

## 2020-07-13 RX ADMIN — HEPARIN SODIUM 38000 UNITS: 1000 INJECTION, SOLUTION INTRAVENOUS; SUBCUTANEOUS at 08:07

## 2020-07-13 RX ADMIN — METOPROLOL TARTRATE 12.5 MG: 25 TABLET, FILM COATED ORAL at 04:19

## 2020-07-13 RX ADMIN — CISATRACURIUM BESYLATE 10 MG: 2 INJECTION INTRAVENOUS at 08:00

## 2020-07-13 RX ADMIN — MUPIROCIN: 20 OINTMENT TOPICAL at 21:33

## 2020-07-13 RX ADMIN — SODIUM CHLORIDE: 9 INJECTION, SOLUTION INTRAVENOUS at 09:00

## 2020-07-13 RX ADMIN — PROTAMINE SULFATE 50 MG: 10 INJECTION, SOLUTION INTRAVENOUS at 11:25

## 2020-07-13 RX ADMIN — FENTANYL CITRATE 250 MCG: 50 INJECTION, SOLUTION INTRAMUSCULAR; INTRAVENOUS at 07:33

## 2020-07-13 RX ADMIN — CEFAZOLIN SODIUM 2 G: 10 INJECTION, POWDER, FOR SOLUTION INTRAVENOUS at 18:26

## 2020-07-13 RX ADMIN — SODIUM CHLORIDE 2.1 UNITS/HR: 9 INJECTION, SOLUTION INTRAVENOUS at 13:12

## 2020-07-13 RX ADMIN — CISATRACURIUM BESYLATE 10 MG: 2 INJECTION INTRAVENOUS at 10:00

## 2020-07-13 RX ADMIN — SODIUM CHLORIDE, POTASSIUM CHLORIDE, SODIUM LACTATE AND CALCIUM CHLORIDE: 600; 310; 30; 20 INJECTION, SOLUTION INTRAVENOUS at 04:20

## 2020-07-13 RX ADMIN — MAGNESIUM SULFATE 2 G: 2 INJECTION INTRAVENOUS at 19:21

## 2020-07-13 RX ADMIN — NOREPINEPHRINE BITARTRATE 8 MCG: 1 INJECTION INTRAVENOUS at 07:15

## 2020-07-13 RX ADMIN — SODIUM CHLORIDE, POTASSIUM CHLORIDE, SODIUM LACTATE AND CALCIUM CHLORIDE: 600; 310; 30; 20 INJECTION, SOLUTION INTRAVENOUS at 12:13

## 2020-07-13 RX ADMIN — SODIUM CHLORIDE, POTASSIUM CHLORIDE, SODIUM LACTATE AND CALCIUM CHLORIDE: 600; 310; 30; 20 INJECTION, SOLUTION INTRAVENOUS at 11:47

## 2020-07-13 RX ADMIN — CALCIUM CHLORIDE 0.5 G: 100 INJECTION, SOLUTION INTRAVENOUS at 11:27

## 2020-07-13 RX ADMIN — SODIUM CHLORIDE, POTASSIUM CHLORIDE, SODIUM LACTATE AND CALCIUM CHLORIDE: 600; 310; 30; 20 INJECTION, SOLUTION INTRAVENOUS at 09:30

## 2020-07-13 RX ADMIN — Medication 2 MG: at 12:16

## 2020-07-13 RX ADMIN — EPHEDRINE SULFATE 2.5 MG: 50 INJECTION INTRAVENOUS at 11:46

## 2020-07-13 RX ADMIN — AMIODARONE HYDROCHLORIDE 300 MG: 50 INJECTION, SOLUTION INTRAVENOUS at 09:20

## 2020-07-13 RX ADMIN — EPHEDRINE SULFATE 5 MG: 50 INJECTION INTRAVENOUS at 07:15

## 2020-07-13 RX ADMIN — NOREPINEPHRINE BITARTRATE 8 MCG: 1 INJECTION INTRAVENOUS at 07:12

## 2020-07-13 RX ADMIN — EPHEDRINE SULFATE 2.5 MG: 50 INJECTION INTRAVENOUS at 08:21

## 2020-07-13 RX ADMIN — ACETAMINOPHEN 1000 MG: 10 INJECTION, SOLUTION INTRAVENOUS at 07:30

## 2020-07-13 RX ADMIN — ASPIRIN 81 MG: 81 TABLET, COATED ORAL at 04:19

## 2020-07-13 RX ADMIN — PROTAMINE SULFATE 500 MG: 10 INJECTION, SOLUTION INTRAVENOUS at 11:16

## 2020-07-13 RX ADMIN — CISATRACURIUM BESYLATE 10 MG: 2 INJECTION INTRAVENOUS at 10:59

## 2020-07-13 RX ADMIN — CHLORHEXIDINE GLUCONATE 15 ML: 1.2 RINSE ORAL at 04:19

## 2020-07-13 RX ADMIN — SODIUM CHLORIDE, POTASSIUM CHLORIDE, SODIUM LACTATE AND CALCIUM CHLORIDE: 600; 310; 30; 20 INJECTION, SOLUTION INTRAVENOUS at 06:56

## 2020-07-13 RX ADMIN — MORPHINE SULFATE 4 MG: 4 INJECTION, SOLUTION INTRAMUSCULAR; INTRAVENOUS at 13:29

## 2020-07-13 RX ADMIN — FENTANYL CITRATE 500 MCG: 50 INJECTION, SOLUTION INTRAMUSCULAR; INTRAVENOUS at 07:37

## 2020-07-13 RX ADMIN — SODIUM CHLORIDE, POTASSIUM CHLORIDE, SODIUM LACTATE AND CALCIUM CHLORIDE: 600; 310; 30; 20 INJECTION, SOLUTION INTRAVENOUS at 13:30

## 2020-07-13 RX ADMIN — CISATRACURIUM BESYLATE 20 MG: 2 INJECTION INTRAVENOUS at 07:01

## 2020-07-13 RX ADMIN — Medication 10 ML: at 21:35

## 2020-07-13 RX ADMIN — EPHEDRINE SULFATE 5 MG: 50 INJECTION INTRAVENOUS at 07:12

## 2020-07-13 RX ADMIN — AMINOCAPROIC ACID 5000 MG: 250 INJECTION, SOLUTION INTRAVENOUS at 07:30

## 2020-07-13 RX ADMIN — POTASSIUM CHLORIDE 20 MEQ: 400 INJECTION, SOLUTION INTRAVENOUS at 19:21

## 2020-07-13 RX ADMIN — KETAMINE HYDROCHLORIDE 50 MG: 100 INJECTION, SOLUTION INTRAMUSCULAR; INTRAVENOUS at 07:00

## 2020-07-13 RX ADMIN — VANCOMYCIN HYDROCHLORIDE 1.5 G: 10 INJECTION, POWDER, LYOPHILIZED, FOR SOLUTION INTRAVENOUS at 21:34

## 2020-07-13 RX ADMIN — MIDAZOLAM HYDROCHLORIDE 2 MG: 2 INJECTION, SOLUTION INTRAMUSCULAR; INTRAVENOUS at 07:00

## 2020-07-13 RX ADMIN — ETOMIDATE 5 MG: 2 INJECTION INTRAVENOUS at 07:00

## 2020-07-13 RX ADMIN — EPHEDRINE SULFATE 2.5 MG: 50 INJECTION INTRAVENOUS at 08:18

## 2020-07-13 RX ADMIN — FAMOTIDINE 20 MG: 10 INJECTION, SOLUTION INTRAVENOUS at 21:33

## 2020-07-13 RX ADMIN — FENTANYL CITRATE 250 MCG: 50 INJECTION, SOLUTION INTRAMUSCULAR; INTRAVENOUS at 06:57

## 2020-07-13 RX ADMIN — CEFAZOLIN SODIUM 1 G: 10 INJECTION, POWDER, FOR SOLUTION INTRAVENOUS at 11:27

## 2020-07-13 RX ADMIN — MORPHINE SULFATE 2 MG: 2 INJECTION, SOLUTION INTRAMUSCULAR; INTRAVENOUS at 17:18

## 2020-07-13 RX ADMIN — KETOROLAC TROMETHAMINE 15 MG: 30 INJECTION, SOLUTION INTRAMUSCULAR at 13:52

## 2020-07-13 RX ADMIN — CEFAZOLIN SODIUM 2 G: 10 INJECTION, POWDER, FOR SOLUTION INTRAVENOUS at 07:30

## 2020-07-13 RX ADMIN — LABETALOL HYDROCHLORIDE 5 MG: 5 INJECTION, SOLUTION INTRAVENOUS at 12:01

## 2020-07-13 RX ADMIN — GLYCOPYRROLATE 0.2 MG: 0.2 INJECTION, SOLUTION INTRAMUSCULAR; INTRAVENOUS at 12:16

## 2020-07-13 RX ADMIN — ANTISEPTIC SURGICAL SCRUB: 0.04 SOLUTION TOPICAL at 04:20

## 2020-07-13 RX ADMIN — SODIUM CHLORIDE 2.52 UNITS/HR: 9 INJECTION, SOLUTION INTRAVENOUS at 09:00

## 2020-07-13 RX ADMIN — MORPHINE SULFATE 4 MG: 4 INJECTION, SOLUTION INTRAMUSCULAR; INTRAVENOUS at 21:33

## 2020-07-13 RX ADMIN — MILRINONE LACTATE IN DEXTROSE 0.25 MCG/KG/MIN: 200 INJECTION, SOLUTION INTRAVENOUS at 13:31

## 2020-07-13 RX ADMIN — MORPHINE SULFATE 2 MG: 2 INJECTION, SOLUTION INTRAMUSCULAR; INTRAVENOUS at 16:32

## 2020-07-13 ASSESSMENT — PULMONARY FUNCTION TESTS
PIF_VALUE: 22
PIF_VALUE: 17
PIF_VALUE: 22
PIF_VALUE: 22
PIF_VALUE: 21
PIF_VALUE: 1
PIF_VALUE: 21
PIF_VALUE: 23
PIF_VALUE: 23
PIF_VALUE: 0
PIF_VALUE: 16
PIF_VALUE: 22
PIF_VALUE: 18
PIF_VALUE: 22
PIF_VALUE: 22
PIF_VALUE: 21
PIF_VALUE: 1
PIF_VALUE: 0
PIF_VALUE: 20
PIF_VALUE: 20
PIF_VALUE: 3
PIF_VALUE: 18
PIF_VALUE: 22
PIF_VALUE: 0
PIF_VALUE: 22
PIF_VALUE: 1
PIF_VALUE: 17
PIF_VALUE: 18
PIF_VALUE: 1
PIF_VALUE: 8
PIF_VALUE: 23
PIF_VALUE: 17
PIF_VALUE: 1
PIF_VALUE: 22
PIF_VALUE: 0
PIF_VALUE: 4
PIF_VALUE: 22
PIF_VALUE: 1
PIF_VALUE: 23
PIF_VALUE: 21
PIF_VALUE: 17
PIF_VALUE: 1
PIF_VALUE: 22
PIF_VALUE: 1
PIF_VALUE: 1
PIF_VALUE: 17
PIF_VALUE: 19
PIF_VALUE: 21
PIF_VALUE: 21
PIF_VALUE: 16
PIF_VALUE: 17
PIF_VALUE: 0
PIF_VALUE: 17
PIF_VALUE: 1
PIF_VALUE: 24
PIF_VALUE: 24
PIF_VALUE: 1
PIF_VALUE: 0
PIF_VALUE: 3
PIF_VALUE: 21
PIF_VALUE: 23
PIF_VALUE: 1
PIF_VALUE: 22
PIF_VALUE: 17
PIF_VALUE: 23
PIF_VALUE: 22
PIF_VALUE: 1
PIF_VALUE: 1
PIF_VALUE: 0
PIF_VALUE: 15
PIF_VALUE: 16
PIF_VALUE: 18
PIF_VALUE: 6
PIF_VALUE: 17
PIF_VALUE: 23
PIF_VALUE: 22
PIF_VALUE: 4
PIF_VALUE: 22
PIF_VALUE: 17
PIF_VALUE: 20
PIF_VALUE: 21
PIF_VALUE: 22
PIF_VALUE: 16
PIF_VALUE: 22
PIF_VALUE: 21
PIF_VALUE: 1
PIF_VALUE: 17
PIF_VALUE: 23
PIF_VALUE: 24
PIF_VALUE: 1
PIF_VALUE: 1
PIF_VALUE: 18
PIF_VALUE: 17
PIF_VALUE: 0
PIF_VALUE: 22
PIF_VALUE: 1
PIF_VALUE: 22
PIF_VALUE: 3
PIF_VALUE: 1
PIF_VALUE: 22
PIF_VALUE: 0
PIF_VALUE: 21
PIF_VALUE: 22
PIF_VALUE: 0
PIF_VALUE: 1
PIF_VALUE: 3
PIF_VALUE: 22
PIF_VALUE: 22
PIF_VALUE: 1
PIF_VALUE: 3
PIF_VALUE: 17
PIF_VALUE: 14
PIF_VALUE: 21
PIF_VALUE: 17
PIF_VALUE: 22
PIF_VALUE: 19
PIF_VALUE: 1
PIF_VALUE: 17
PIF_VALUE: 22
PIF_VALUE: 20
PIF_VALUE: 21
PIF_VALUE: 0
PIF_VALUE: 22
PIF_VALUE: 0
PIF_VALUE: 1
PIF_VALUE: 23
PIF_VALUE: 2
PIF_VALUE: 16
PIF_VALUE: 23
PIF_VALUE: 14
PIF_VALUE: 22
PIF_VALUE: 16
PIF_VALUE: 22
PIF_VALUE: 23
PIF_VALUE: 21
PIF_VALUE: 22
PIF_VALUE: 21
PIF_VALUE: 22
PIF_VALUE: 22
PIF_VALUE: 15
PIF_VALUE: 2
PIF_VALUE: 22
PIF_VALUE: 19
PIF_VALUE: 23
PIF_VALUE: 21
PIF_VALUE: 28
PIF_VALUE: 22
PIF_VALUE: 1
PIF_VALUE: 24
PIF_VALUE: 23
PIF_VALUE: 22
PIF_VALUE: 21
PIF_VALUE: 21
PIF_VALUE: 22
PIF_VALUE: 22
PIF_VALUE: 1
PIF_VALUE: 3
PIF_VALUE: 22
PIF_VALUE: 17
PIF_VALUE: 15
PIF_VALUE: 4
PIF_VALUE: 1
PIF_VALUE: 22
PIF_VALUE: 18
PIF_VALUE: 1
PIF_VALUE: 22
PIF_VALUE: 1
PIF_VALUE: 0
PIF_VALUE: 23
PIF_VALUE: 3
PIF_VALUE: 22
PIF_VALUE: 0
PIF_VALUE: 21
PIF_VALUE: 22
PIF_VALUE: 16
PIF_VALUE: 23
PIF_VALUE: 24
PIF_VALUE: 21
PIF_VALUE: 0
PIF_VALUE: 21
PIF_VALUE: 18
PIF_VALUE: 1
PIF_VALUE: 22
PIF_VALUE: 22
PIF_VALUE: 18
PIF_VALUE: 21
PIF_VALUE: 16
PIF_VALUE: 16
PIF_VALUE: 18
PIF_VALUE: 16
PIF_VALUE: 22
PIF_VALUE: 3
PIF_VALUE: 16
PIF_VALUE: 16
PIF_VALUE: 17
PIF_VALUE: 22
PIF_VALUE: 23
PIF_VALUE: 16
PIF_VALUE: 1
PIF_VALUE: 1
PIF_VALUE: 17
PIF_VALUE: 22
PIF_VALUE: 1
PIF_VALUE: 19
PIF_VALUE: 21
PIF_VALUE: 21
PIF_VALUE: 22
PIF_VALUE: 22
PIF_VALUE: 16
PIF_VALUE: 16
PIF_VALUE: 19
PIF_VALUE: 21
PIF_VALUE: 22
PIF_VALUE: 16
PIF_VALUE: 16
PIF_VALUE: 4
PIF_VALUE: 2
PIF_VALUE: 15
PIF_VALUE: 17
PIF_VALUE: 21
PIF_VALUE: 0
PIF_VALUE: 1
PIF_VALUE: 0
PIF_VALUE: 22

## 2020-07-13 ASSESSMENT — PAIN DESCRIPTION - DESCRIPTORS: DESCRIPTORS: ACHING

## 2020-07-13 ASSESSMENT — PAIN DESCRIPTION - LOCATION: LOCATION: STERNUM

## 2020-07-13 ASSESSMENT — PAIN - FUNCTIONAL ASSESSMENT: PAIN_FUNCTIONAL_ASSESSMENT: PREVENTS OR INTERFERES WITH MANY ACTIVE NOT PASSIVE ACTIVITIES

## 2020-07-13 ASSESSMENT — PAIN DESCRIPTION - ORIENTATION: ORIENTATION: MID

## 2020-07-13 ASSESSMENT — PAIN SCALES - GENERAL
PAINLEVEL_OUTOF10: 7
PAINLEVEL_OUTOF10: 7
PAINLEVEL_OUTOF10: 6
PAINLEVEL_OUTOF10: 8
PAINLEVEL_OUTOF10: 0
PAINLEVEL_OUTOF10: 7
PAINLEVEL_OUTOF10: 6
PAINLEVEL_OUTOF10: 8

## 2020-07-13 ASSESSMENT — PAIN DESCRIPTION - PROGRESSION: CLINICAL_PROGRESSION: NOT CHANGED

## 2020-07-13 ASSESSMENT — PAIN DESCRIPTION - PAIN TYPE: TYPE: SURGICAL PAIN

## 2020-07-13 ASSESSMENT — PAIN DESCRIPTION - ONSET: ONSET: ON-GOING

## 2020-07-13 ASSESSMENT — PAIN DESCRIPTION - FREQUENCY: FREQUENCY: CONTINUOUS

## 2020-07-13 NOTE — CARE COORDINATION
Chart reviewed by  d/t length of stay. Day #5. Pt on B2 at this time. S/p urgent CABG today. PT/OT to be started tomorrow. Per initial CM assessment on 7/9/20, pt lives with spouse in single story home. Pt cares for spuose and was IPTA. Pt was open to services per provider recommendations. CM will follow for therapy recs tomorrow.     Leyda Quintanilla RN CM

## 2020-07-13 NOTE — PROGRESS NOTES
4 Eyes Skin Assessment     The patient is being assess for  Admission    I agree that 2 RN's have performed a thorough Head to Toe Skin Assessment on the patient. ALL assessment sites listed below have been assessed. Areas assessed by both nurses: Peter/Reji   [x]   Head, Face, and Ears   [x]   Shoulders, Back, and Chest  [x]   Arms, Elbows, and Hands   [x]   Coccyx, Sacrum, and IschIum  [x]   Legs, Feet, and Heels        Does the Patient have Skin Breakdown?   No         Brady Prevention initiated:  No   Wound Care Orders initiated:  NA      [facility] nurse consulted for Pressure Injury (Stage 3,4, Unstageable, DTI, NWPT, and Complex wounds), New and Established Ostomies:  NA      Nurse 1 eSignature: Electronically signed by Carla Feliciano RN on 7/13/20 at 7:23 PM EDT    **SHARE this note so that the co-signing nurse is able to place an eSignature**    Nurse 2 eSignature: Electronically signed by Sherlyn Bright RN on 7/14/20 at 3:37 AM EDT

## 2020-07-13 NOTE — PROGRESS NOTES
Patient admitted to CVU from [place] [address] and attached to ventilator and monitors. Report received from anesthesiologist.  Chest x-ray ordered. Labs drawn and sent. Assessment complete. Hemodymanics stable and will continue to monitor. Family let back to see patient. Visiting hours reviewed and all questions answered. Family aware of discharge class.  Primary RN Yoli RN    RECOVERY PERIOD BEGINS  1240

## 2020-07-13 NOTE — PROCEDURES
[facility]                               PULMONARY FUNCTION    PATIENT NAME: Nathalie Mandel                          :        1933  MED REC NO:   [id number]                          ROOM:       8643  ACCOUNT NO:   [de-identified]                           ADMIT DATE: 2020  PROVIDER:     Dominique Yeung MD    DATE OF PROCEDURE:  20    This is an 42-year-old male. TEST PERFORMED:  1. Spirometry of flow-volume loops obtained before and after  bronchodilation. 2.  Lung volumes by plethysmography. 3.  Diffusion capacity of carbon monoxide. Test meets ATS criteria and the quality of flow-volume loops is  sufficient for interpretation. Good patient effort. The FEV1 is 2.76 L or 111%. The FEV1 to FVC ratio is 80. Postbronchodilator, the FEV1 changed to 2.74 L or 110% predicted. Total  lung capacity is 90% predicted and diffusion is 106% predicted. INTERPRETATION:  1. No obstruction, no significant postbronchodilator improvement. 2.  Normal lung volumes. 3.  Normal diffusion capacity. 4.  Normal pulmonary function testing, clinical correlation recommended.         Patricia Eid MD    D: 2020 11:49:17       T: 2020 14:27:37     [email or web address]  Job#: [id number]     Doc#: [id number]    CC:

## 2020-07-13 NOTE — PLAN OF CARE
Problem: Falls - Risk of:  Goal: Will remain free from falls  Description: Will remain free from falls  Outcome: Ongoing     Problem: Pain:  Goal: Pain level will decrease  Description: Pain level will decrease  Outcome: Ongoing     Problem: Preoperative Routine:  Goal: Will comply with preparation for surgery or procedure  Description: Will comply with preparation for surgery or procedure  Outcome: Ongoing

## 2020-07-13 NOTE — BRIEF OP NOTE
Brief Postoperative Note      Patient: Gera Gonzalez  Date of Birth: [birth year]MRN: [id number]  Date of Procedure: 7/13/2020    Pre-Op Diagnosis: CAD with [place]. NSTEMI. Severe LMT stenosis. HTN. Dyslipidemia. S/P PCI with BRET to LAD(remote). Post-Op Diagnosis: same        Procedure(s):  Urgent CABG X 2, with pedicled LIMA to LAD, single Greater Saphenous VG to OM, EDWINA obliteration with 45 mm Atricure LA Clip, SGC, CPB, EVH Right Greater Saphenous vein, ELISEO, Epiaortic ultrasound, Doppler verification of grafts, Bilateral 5 level intercostal nerve block(Exparel), Platelet gel application. Surgeon(s):  Janet Thorpe MD    Assistants: Sommer, , Josiah Salazar    Anesthesia: General ET/ MD Sekou    Estimated Blood Loss (mL): 100   ml    Replacements:  1 pool plts, 1 U FFP, 5 U cryoppt    Pump time:  150 min    Cross-clamp time:  NA    Findings: heavily calcified aorta(egg shell). No MR or AI    Complications: None    Specimens:   * No specimens in log *    Implants:  * No implants in log *      Drains:   Chest Tube 1 Mediastinal 32 [place] citizen (Active)       Chest Tube 2 Left Pleural 32 [place] citizen (Active)       Urethral Catheter Latex; Temperature probe 16 fr (Active)       Janet Thorpe MD  Date: 7/13/2020  Time: 12:12 PM   [id number]

## 2020-07-13 NOTE — PROGRESS NOTES
Results for Lexis Gil (MRN [id number]) as of 7/13/2020 17:02   Ref.  Range 7/13/2020 14:11 7/13/2020 16:43   pH, Arterial Latest Ref Range: 7.350 - 7.450  7.352    pCO2, Arterial Latest Ref Range: 35.0 - 45.0 mm Hg 39.6    pO2, Arterial Latest Ref Range: 75.0 - 108.0 mm Hg 56.3 (L)    HCO3, Arterial Latest Ref Range: 21.0 - 29.0 mmol/L 21.9    TCO2 (calc), Art Latest Ref Range: Not Established mmol/L 23    Base Excess, Arterial Latest Ref Range: -3 - 3  -4 (L)    O2 Sat, Arterial Latest Ref Range: 93 - 100 % 88 (L)    Sample Type Unknown ART      Pt transitioned from NRB 15lpm to NC 4lpm .

## 2020-07-13 NOTE — ANESTHESIA POSTPROCEDURE EVALUATION
Department of Anesthesiology  Postprocedure Note    Patient: Lorinda Skiff  MRN: [id number]  YOB: 1933  Date of evaluation: 7/13/2020  Time:  12:50 PM     Procedure Summary     Date:  07/13/20 Room / Location:  [address] [place] [address] / [facility]    Anesthesia Start:  4972 Anesthesia Stop:  5708    Procedure:  CORONARY ARTERY BYPASS GRAFTING X2, INTERNAL MAMMARY ARTERY, SAPHENOUS VEIN GRAFT, ON PUMP BEATING HEART,  45MM EDWINA CLIP, 5 LEVEL BILATERAL INTERCOSTAL NERVE BLOCK (N/A Chest) Diagnosis:  (-)    Surgeon:  Shen Hart MD Responsible Provider:  Kelby Boo MD    Anesthesia Type:  general ASA Status:  4          Anesthesia Type: general    Kemar Phase I:      Kemar Phase II:      Last vitals: Reviewed and per EMR flowsheets.        Anesthesia Post Evaluation    Patient location during evaluation: ICU  Patient participation: waiting for patient participation  Level of consciousness: awake and lethargic  Pain scale: see nsg note   Airway patency: patent  Nausea & Vomiting: no vomiting and no nausea  Complications: no  Cardiovascular status: hemodynamically stable and vasoactive/inotropes  Respiratory status: acceptable and face mask  Hydration status: stable

## 2020-07-14 LAB
ANION GAP SERPL CALCULATED.3IONS-SCNC: 11 MMOL/L (ref 3–16)
BUN BLDV-MCNC: 13 MG/DL (ref 7–20)
CALCIUM IONIZED: 0.95 MMOL/L (ref 1.12–1.32)
CALCIUM SERPL-MCNC: 6.7 MG/DL (ref 8.3–10.6)
CHLORIDE BLD-SCNC: 105 MMOL/L (ref 99–110)
CO2: 23 MMOL/L (ref 21–32)
CREAT SERPL-MCNC: 0.9 MG/DL (ref 0.8–1.3)
GFR AFRICAN AMERICAN: >60
GFR NON-AFRICAN AMERICAN: >60
GLUCOSE BLD-MCNC: 101 MG/DL (ref 70–99)
GLUCOSE BLD-MCNC: 110 MG/DL (ref 70–99)
GLUCOSE BLD-MCNC: 112 MG/DL (ref 70–99)
GLUCOSE BLD-MCNC: 123 MG/DL (ref 70–99)
GLUCOSE BLD-MCNC: 128 MG/DL (ref 70–99)
GLUCOSE BLD-MCNC: 130 MG/DL (ref 70–99)
GLUCOSE BLD-MCNC: 130 MG/DL (ref 70–99)
GLUCOSE BLD-MCNC: 131 MG/DL (ref 70–99)
GLUCOSE BLD-MCNC: 132 MG/DL (ref 70–99)
GLUCOSE BLD-MCNC: 132 MG/DL (ref 70–99)
GLUCOSE BLD-MCNC: 149 MG/DL (ref 70–99)
GLUCOSE BLD-MCNC: 162 MG/DL (ref 70–99)
GLUCOSE BLD-MCNC: 181 MG/DL (ref 70–99)
GLUCOSE BLD-MCNC: 181 MG/DL (ref 70–99)
HCT VFR BLD CALC: 28.5 % (ref 40.5–52.5)
HCT VFR BLD CALC: 29.1 % (ref 40.5–52.5)
HCT VFR BLD CALC: 29.2 % (ref 40.5–52.5)
HCT VFR BLD CALC: 29.6 % (ref 40.5–52.5)
HEMOGLOBIN: 10 G/DL (ref 13.5–17.5)
HEMOGLOBIN: 10.1 G/DL (ref 13.5–17.5)
HEMOGLOBIN: 9.9 G/DL (ref 13.5–17.5)
HEMOGLOBIN: 9.9 G/DL (ref 13.5–17.5)
INR BLD: 1.22 (ref 0.86–1.14)
MAGNESIUM: 2.2 MG/DL (ref 1.8–2.4)
MCH RBC QN AUTO: 31.9 PG (ref 26–34)
MCHC RBC AUTO-ENTMCNC: 34.3 G/DL (ref 31–36)
MCV RBC AUTO: 92.9 FL (ref 80–100)
PDW BLD-RTO: 14.5 % (ref 12.4–15.4)
PERFORMED ON: ABNORMAL
PH VENOUS: 7.35 (ref 7.35–7.45)
PLATELET # BLD: 129 K/UL (ref 135–450)
PMV BLD AUTO: 9.7 FL (ref 5–10.5)
POTASSIUM SERPL-SCNC: 4.1 MMOL/L (ref 3.5–5.1)
POTASSIUM SERPL-SCNC: 4.3 MMOL/L (ref 3.5–5.1)
PROTHROMBIN TIME: 14.2 SEC (ref 10–13.2)
RBC # BLD: 3.18 M/UL (ref 4.2–5.9)
SODIUM BLD-SCNC: 139 MMOL/L (ref 136–145)
WBC # BLD: 10.1 K/UL (ref 4–11)

## 2020-07-14 PROCEDURE — 2500000003 HC RX 250 WO HCPCS: Performed by: THORACIC SURGERY (CARDIOTHORACIC VASCULAR SURGERY)

## 2020-07-14 PROCEDURE — 85014 HEMATOCRIT: CPT

## 2020-07-14 PROCEDURE — 36415 COLL VENOUS BLD VENIPUNCTURE: CPT

## 2020-07-14 PROCEDURE — 6360000002 HC RX W HCPCS: Performed by: THORACIC SURGERY (CARDIOTHORACIC VASCULAR SURGERY)

## 2020-07-14 PROCEDURE — 2000000000 HC ICU R&B

## 2020-07-14 PROCEDURE — 97167 OT EVAL HIGH COMPLEX 60 MIN: CPT

## 2020-07-14 PROCEDURE — 84132 ASSAY OF SERUM POTASSIUM: CPT

## 2020-07-14 PROCEDURE — 6370000000 HC RX 637 (ALT 250 FOR IP): Performed by: NURSE PRACTITIONER

## 2020-07-14 PROCEDURE — 85610 PROTHROMBIN TIME: CPT

## 2020-07-14 PROCEDURE — 2580000003 HC RX 258: Performed by: THORACIC SURGERY (CARDIOTHORACIC VASCULAR SURGERY)

## 2020-07-14 PROCEDURE — 6370000000 HC RX 637 (ALT 250 FOR IP): Performed by: THORACIC SURGERY (CARDIOTHORACIC VASCULAR SURGERY)

## 2020-07-14 PROCEDURE — 85027 COMPLETE CBC AUTOMATED: CPT

## 2020-07-14 PROCEDURE — 85018 HEMOGLOBIN: CPT

## 2020-07-14 PROCEDURE — 80048 BASIC METABOLIC PNL TOTAL CA: CPT

## 2020-07-14 PROCEDURE — 83735 ASSAY OF MAGNESIUM: CPT

## 2020-07-14 PROCEDURE — 82330 ASSAY OF CALCIUM: CPT

## 2020-07-14 PROCEDURE — 99232 SBSQ HOSP IP/OBS MODERATE 35: CPT | Performed by: NURSE PRACTITIONER

## 2020-07-14 PROCEDURE — 2700000000 HC OXYGEN THERAPY PER DAY

## 2020-07-14 PROCEDURE — 97530 THERAPEUTIC ACTIVITIES: CPT

## 2020-07-14 PROCEDURE — 97116 GAIT TRAINING THERAPY: CPT

## 2020-07-14 PROCEDURE — 2100000000 HC CCU R&B

## 2020-07-14 PROCEDURE — 99024 POSTOP FOLLOW-UP VISIT: CPT | Performed by: THORACIC SURGERY (CARDIOTHORACIC VASCULAR SURGERY)

## 2020-07-14 PROCEDURE — 97162 PT EVAL MOD COMPLEX 30 MIN: CPT

## 2020-07-14 PROCEDURE — 94669 MECHANICAL CHEST WALL OSCILL: CPT

## 2020-07-14 RX ADMIN — ACETAMINOPHEN 650 MG: 325 TABLET ORAL at 22:41

## 2020-07-14 RX ADMIN — ACETAMINOPHEN 650 MG: 325 TABLET ORAL at 16:22

## 2020-07-14 RX ADMIN — ASPIRIN 81 MG: 81 TABLET ORAL at 08:32

## 2020-07-14 RX ADMIN — VANCOMYCIN HYDROCHLORIDE 1.5 G: 10 INJECTION, POWDER, LYOPHILIZED, FOR SOLUTION INTRAVENOUS at 07:18

## 2020-07-14 RX ADMIN — Medication 10 ML: at 22:42

## 2020-07-14 RX ADMIN — POTASSIUM CHLORIDE 10 MEQ: 10 TABLET, EXTENDED RELEASE ORAL at 11:34

## 2020-07-14 RX ADMIN — FUROSEMIDE 40 MG: 10 INJECTION, SOLUTION INTRAMUSCULAR; INTRAVENOUS at 08:33

## 2020-07-14 RX ADMIN — LEVOTHYROXINE SODIUM 50 MCG: 0.05 TABLET ORAL at 07:18

## 2020-07-14 RX ADMIN — FUROSEMIDE 40 MG: 10 INJECTION, SOLUTION INTRAMUSCULAR; INTRAVENOUS at 18:04

## 2020-07-14 RX ADMIN — POTASSIUM CHLORIDE 20 MEQ: 400 INJECTION, SOLUTION INTRAVENOUS at 02:39

## 2020-07-14 RX ADMIN — MUPIROCIN: 20 OINTMENT TOPICAL at 08:34

## 2020-07-14 RX ADMIN — POLYETHYLENE GLYCOL 3350 17 G: 17 POWDER, FOR SOLUTION ORAL at 08:33

## 2020-07-14 RX ADMIN — POTASSIUM CHLORIDE 10 MEQ: 10 TABLET, EXTENDED RELEASE ORAL at 08:32

## 2020-07-14 RX ADMIN — OXYCODONE 5 MG: 5 TABLET ORAL at 10:12

## 2020-07-14 RX ADMIN — OXYCODONE 5 MG: 5 TABLET ORAL at 04:11

## 2020-07-14 RX ADMIN — ATORVASTATIN CALCIUM 80 MG: 80 TABLET, FILM COATED ORAL at 22:41

## 2020-07-14 RX ADMIN — Medication 15 ML: at 22:42

## 2020-07-14 RX ADMIN — CEFAZOLIN SODIUM 2 G: 10 INJECTION, POWDER, FOR SOLUTION INTRAVENOUS at 03:45

## 2020-07-14 RX ADMIN — Medication 10 ML: at 08:46

## 2020-07-14 RX ADMIN — METOPROLOL TARTRATE 12.5 MG: 25 TABLET, FILM COATED ORAL at 08:32

## 2020-07-14 RX ADMIN — OXYCODONE 10 MG: 5 TABLET ORAL at 22:41

## 2020-07-14 RX ADMIN — BISACODYL 5 MG: 5 TABLET, COATED ORAL at 08:32

## 2020-07-14 RX ADMIN — CEFAZOLIN SODIUM 2 G: 10 INJECTION, POWDER, FOR SOLUTION INTRAVENOUS at 10:17

## 2020-07-14 RX ADMIN — INSULIN GLARGINE 13 UNITS: 100 INJECTION, SOLUTION SUBCUTANEOUS at 23:47

## 2020-07-14 RX ADMIN — KETOROLAC TROMETHAMINE 15 MG: 30 INJECTION, SOLUTION INTRAMUSCULAR at 10:12

## 2020-07-14 RX ADMIN — POTASSIUM CHLORIDE 10 MEQ: 10 TABLET, EXTENDED RELEASE ORAL at 18:04

## 2020-07-14 RX ADMIN — ACETAMINOPHEN 650 MG: 325 TABLET ORAL at 06:57

## 2020-07-14 RX ADMIN — METOPROLOL TARTRATE 25 MG: 25 TABLET, FILM COATED ORAL at 22:41

## 2020-07-14 RX ADMIN — FAMOTIDINE 20 MG: 10 INJECTION, SOLUTION INTRAVENOUS at 22:41

## 2020-07-14 RX ADMIN — METOPROLOL TARTRATE 12.5 MG: 25 TABLET, FILM COATED ORAL at 11:34

## 2020-07-14 RX ADMIN — OXYCODONE 10 MG: 5 TABLET ORAL at 07:10

## 2020-07-14 RX ADMIN — MUPIROCIN: 20 OINTMENT TOPICAL at 22:42

## 2020-07-14 RX ADMIN — Medication 15 ML: at 08:46

## 2020-07-14 RX ADMIN — FAMOTIDINE 20 MG: 10 INJECTION, SOLUTION INTRAVENOUS at 08:33

## 2020-07-14 RX ADMIN — CEFAZOLIN SODIUM 2 G: 10 INJECTION, POWDER, FOR SOLUTION INTRAVENOUS at 18:54

## 2020-07-14 RX ADMIN — ALLOPURINOL 300 MG: 300 TABLET ORAL at 08:32

## 2020-07-14 ASSESSMENT — PAIN SCALES - GENERAL
PAINLEVEL_OUTOF10: 6
PAINLEVEL_OUTOF10: 6
PAINLEVEL_OUTOF10: 8
PAINLEVEL_OUTOF10: 10
PAINLEVEL_OUTOF10: 9
PAINLEVEL_OUTOF10: 6
PAINLEVEL_OUTOF10: 10
PAINLEVEL_OUTOF10: 9
PAINLEVEL_OUTOF10: 6
PAINLEVEL_OUTOF10: 10
PAINLEVEL_OUTOF10: 6
PAINLEVEL_OUTOF10: 6

## 2020-07-14 ASSESSMENT — ENCOUNTER SYMPTOMS
GASTROINTESTINAL NEGATIVE: 1
RESPIRATORY NEGATIVE: 1

## 2020-07-14 ASSESSMENT — PAIN DESCRIPTION - LOCATION: LOCATION: STERNUM

## 2020-07-14 ASSESSMENT — PAIN DESCRIPTION - ORIENTATION: ORIENTATION: MID

## 2020-07-14 ASSESSMENT — PAIN DESCRIPTION - FREQUENCY: FREQUENCY: CONTINUOUS

## 2020-07-14 ASSESSMENT — PAIN DESCRIPTION - DESCRIPTORS: DESCRIPTORS: SORE;ACHING

## 2020-07-14 ASSESSMENT — PAIN DESCRIPTION - PAIN TYPE: TYPE: SURGICAL PAIN

## 2020-07-14 NOTE — DISCHARGE INSTR - COC
I10    Mixed hyperlipidemia E78.2    Obesity E66.9    Angina, class III (HCC) I20.9    Unstable angina (HCC) I20.0    Chest pain R07.9    NSTEMI (non-ST elevated myocardial infarction) ([facility] [address] [address] I21.4    Dyslipidemia E78.5    Left main coronary artery disease I25.10       Isolation/Infection:   Isolation          No Isolation        Patient Infection Status     None to display          Nurse Assessment:  Last Vital Signs: BP (!) 94/53   Pulse 85   Temp 99 °F (37.2 °C) (Cerebral)   Resp 16   Ht 5' 9\" (1.753 m)   Wt 212 lb 9.6 oz (96.4 kg)   SpO2 97%   BMI 31.40 kg/m²     Last documented pain score (0-10 scale): Pain Level: 6  Last Weight:   Wt Readings from Last 1 Encounters:   07/14/20 212 lb 9.6 oz (96.4 kg)     Mental Status:  {IP PT MENTAL STATUS:76556}    IV Access:  - None    Nursing Mobility/ADLs:  Walking   Assisted  Transfer  Assisted  Bathing  Assisted  Dressing  Assisted  Toileting  Assisted  Feeding  Independent  Med Admin  Independent  Med Delivery   whole    Wound Care Documentation and Therapy:        Elimination:  Continence:   · Bowel: Yes  · Bladder: Yes  Urinary Catheter: None   Colostomy/Ileostomy/Ileal Conduit: No       Date of Last BM: 7/17/2020    Intake/Output Summary (Last 24 hours) at 7/14/2020 1557  Last data filed at 7/14/2020 1300  Gross per 24 hour   Intake 1906 ml   Output 3720 ml   Net -1814 ml     I/O last 3 completed shifts: In: 1906 [P.O.:60; I.V.:1846]  Out: 0411 [Urine:3305; Drains:20; Chest Tube:500]    Safety Concerns: At Risk for Falls    Impairments/Disabilities:      Hearing    Nutrition Therapy:  Current Nutrition Therapy:   - Oral Diet:  General    Routes of Feeding: Oral  Liquids: No Restrictions  Daily Fluid Restriction: yes - amount 1500  Last Modified Barium Swallow with Video (Video Swallowing Test): not done    Treatments at the Time of Hospital Discharge:   Respiratory Treatments: N/A  Oxygen Therapy:  is not on home oxygen therapy.   Ventilator:    - No ventilator support    Rehab Therapies: Physical Therapy and Occupational Therapy  Weight Bearing Status/Restrictions: No weight bearing restirctions  Other Medical Equipment (for information only, NOT a DME order):  walker  Other Treatments: N/A    Patient's personal belongings (please select all that are sent with patient):  {[facility] DME Belongings:[id number]}    RN SIGNATURE:  Electronically signed by Valeria Alvarez RN on 7/19/20 at 10:44 AM EDT    CASE MANAGEMENT/SOCIAL WORK SECTION    Inpatient Status Date: 7/14/20    Readmission Risk Assessment Score:  Readmission Risk              Risk of Unplanned Readmission:        18           Discharging to Facility/ Agency   · Name: [facility]  · Address:  · Phone:  [phone]  · Fax:    / signature: Electronically signed by Alyse Hill RN on 7/19/20 at 9:47 AM EDT    PHYSICIAN SECTION    Prognosis: Good    Condition at Discharge: Stable    Rehab Potential (if transferring to Rehab): Good    Recommended Labs or Other Treatments After Discharge: ***    Physician Certification: I certify the above information and transfer of Caitlyn Hardy  is necessary for the continuing treatment of the diagnosis listed and that he requires [facility] for less 30 days.      Update Admission H&P: No change in H&P    PHYSICIAN SIGNATURE:  Electronically signed by Lili Morris MD on 7/19/20 at 11:08 AM EDT

## 2020-07-14 NOTE — CARE COORDINATION
recs for SNF noted. Per conversation with primary nurse, Jarvis Milton, patient intends to discharge to [facility] [place] and that family had already contacted facility. CM called [employer] and admissions was not aware of patient. They suggested for CM to call [facility] location.  left for [facility] in admissions at [facility].  left for son, Fe Bills ([phone]). Pending call back. Of note: patient's spouse will be staying with family in [facility] for one month, while patient recovers.     Ana María Orozco, RN CM

## 2020-07-14 NOTE — PROGRESS NOTES
[address]  Cardiology  Progress Note    Admission date:  2020    Reason for follow up visit: CAD    HPI/CC: Gale Garcia is a 80 y.o. male was admitted 2020 for chest pain. Troponin elevated. [facility] 2020 showed severe LM disease, referred to CT surgery. On 2020 he had CABG x2 and EDWINA clip. Rhythm overnight has been sinus. Subjective: Feels good, up to chair, no complaints. Vitals:  Blood pressure 114/70, pulse 109, temperature 99 °F (37.2 °C), temperature source Cerebral, resp. rate 16, height 5' 9\" (1.753 m), weight 212 lb 9.6 oz (96.4 kg), SpO2 96 %.   Temp  Av.6 °F (37 °C)  Min: 97 °F (36.1 °C)  Max: 99.1 °F (37.3 °C)  Pulse  Av.5  Min: 89  Max: 109  BP  Min: 94/70  Max: 114/70  SpO2  Av.3 %  Min: 89 %  Max: 100 %  FiO2   Av.4 %  Min: 65 %  Max: 99 %    24 hour I/O    Intake/Output Summary (Last 24 hours) at 2020 1004  Last data filed at 2020 0636  Gross per 24 hour   Intake 5606 ml   Output 5065 ml   Net 541 ml     Current Facility-Administered Medications   Medication Dose Route Frequency Provider Last Rate Last Dose    lactated ringers infusion   Intravenous Continuous Brandon Land MD 25 mL/hr at 20 1330      sodium chloride flush 0.9 % injection 10 mL  10 mL Intravenous 2 times per day Brandon Land MD   10 mL at 20 0846    sodium chloride flush 0.9 % injection 10 mL  10 mL Intravenous PRN Brandon Land MD        potassium chloride 20 mEq/50 mL IVPB (Central Line)  20 mEq Intravenous PRN Brandon Land MD   Stopped at 20 0345    magnesium sulfate 2 g in 50 mL IVPB premix  2 g Intravenous PRN Brandon Land MD   Stopped at 20 2135    calcium chloride 1 g in sodium chloride 0.9 % 100 mL IVPB  1 g Intravenous PRN Brandon Land MD        calcium chloride 2 g in sodium chloride 0.9 % 100 mL IVPB  2 g Intravenous PRN Brandon Land MD        ceFAZolin (ANCEF) 2 g in dextrose 5 % 100 mL IVPB  2 g Intravenous Q8H Brandon Land MD   Stopped at 07/14/20 0446    vancomycin 1.5 g in dextrose 5% 300 mL IVPB  1,500 mg Intravenous Q12H Brandon Land MD   Stopped at 07/14/20 0850    acetaminophen (TYLENOL) tablet 650 mg  650 mg Oral Q6H Brandon Land MD   650 mg at 07/14/20 0657    oxyCODONE (ROXICODONE) immediate release tablet 5 mg  5 mg Oral Q4H PRN Brandon Land MD   5 mg at 07/14/20 0411    Or    oxyCODONE (ROXICODONE) immediate release tablet 10 mg  10 mg Oral Q4H PRN Brandon Land MD   10 mg at 07/14/20 0710    morphine (PF) injection 2 mg  2 mg Intravenous Q2H PRN Brandon Land MD   2 mg at 07/13/20 1718    Or    morphine (PF) injection 4 mg  4 mg Intravenous Q2H PRN Brandon Land MD   4 mg at 07/13/20 2133    midazolam (VERSED) injection 1 mg  1 mg Intravenous Q1H PRN Brandon Land MD        diphenhydrAMINE (BENADRYL) tablet 25 mg  25 mg Oral Nightly PRN Brandon Land MD        polyethylene glycol [facility]) packet 17 g  17 g Oral Daily Brandon Land MD   17 g at 07/14/20 1483    bisacodyl (DULCOLAX) EC tablet 5 mg  5 mg Oral Daily Brandon Land MD   5 mg at 07/14/20 5750    bisacodyl (DULCOLAX) suppository 10 mg  10 mg Rectal Daily PRN Brandon Land MD        ondansetron [facility]) injection 4 mg  4 mg Intravenous Q8H PRN Brandon Land MD        famotidine (PEPCID) injection 20 mg  20 mg Intravenous BID Brandon Land MD   20 mg at 07/14/20 9417    metoprolol tartrate (LOPRESSOR) tablet 12.5 mg  12.5 mg Oral BID Brandon Land MD   12.5 mg at 07/14/20 0832    chlorhexidine (PERIDEX) 0.12 % solution 15 mL  15 mL Mouth/Throat BID Brandon Land MD   15 mL at 07/14/20 0846    furosemide (LASIX) injection 40 mg  40 mg Intravenous BID Brandon Land MD   40 mg at 07/14/20 2546    magnesium oxide (MAG-OX) tablet 400 mg  400 mg Oral Daily Brandon Land MD   Stopped at 07/14/20 0377    mupirocin (BACTROBAN) 2 % ointment   Nasal BID Taylor Couch MD        nitroGLYCERIN (NITRODUR) 0.2 MG/HR 1 patch  1 patch Transdermal Daily Taylor Couch MD   1 patch at 07/14/20 9018    potassium chloride (KLOR-CON M) extended release tablet 10 mEq  10 mEq Oral TID WC Taylor Couch MD   10 mEq at 07/14/20 8982    fondaparinux (ARIXTRA) injection 2.5 mg  2.5 mg Subcutaneous Daily Taylor Couch MD   Stopped at 07/14/20 0930    aspirin EC tablet 81 mg  81 mg Oral Daily Taylor Couch MD   81 mg at 07/14/20 0832    albuterol sulfate  (90 Base) MCG/ACT inhaler 2 puff  2 puff Inhalation Q6H PRN Taylor Couch MD        albumin human 5 % IV solution 25 g  25 g Intravenous PRN Taylor Couch MD        lactated ringers bolus  250 mL Intravenous Continuous PRN Taylor Couch MD        DOBUTamine (DOBUTREX) 500 mg in dextrose 5 % 250 mL infusion  2 mcg/kg/min Intravenous Continuous PRN Taylor Couch MD        phenylephrine (HARRIETT-SYNEPHRINE) 50 mg in dextrose 5 % 250 mL infusion  10 mcg/min Intravenous Continuous PRN Taylor Couch MD        [facility]) 20 mg in dextrose 5 % 100 mL infusion  0.375 mcg/kg/min Intravenous Continuous Taylor Couch MD   Stopped at 07/13/20 1920    norepinephrine (LEVOPHED) 16 mg in dextrose 5 % 250 mL infusion  2 mcg/min Intravenous Continuous PRN Taylor Couch MD   Stopped at 07/13/20 2045    EPINEPHrine (EPINEPHrine HCL) 5 mg in dextrose 5 % 250 mL infusion  1 mcg/min Intravenous Continuous PRN Taylor Couch MD        nitroGLYCERIN 50 mg in dextrose 5% 250 mL infusion  10 mcg/min Intravenous Continuous PRN Taylor Couch MD        insulin regular (HUMULIN R;NOVOLIN R) 100 Units in sodium chloride 0.9 % 100 mL infusion  1 Units/hr Intravenous Titrated Taylor Couch MD 4.5 mL/hr at 07/14/20 0841 4.45 Units/hr at 07/14/20 0841    insulin glargine (LANTUS) injection vial 13 Units  0.15 Units/kg Subcutaneous Nightly MD Pamela Zambrano 7/15/2020] insulin lispro (HUMALOG) injection vial 0-6 Units  0-6 Units Subcutaneous TID WC Mert Gore MD        [START ON 7/15/2020] insulin lispro (HUMALOG) injection vial 0-3 Units  0-3 Units Subcutaneous Nightly Mert Gore MD        glucose (GLUTOSE) 40 % oral gel 15 g  15 g Oral PRN Mert Gore MD        dextrose 50 % IV solution  12.5 g Intravenous PRN Mert Gore MD        glucagon (rDNA) injection 1 mg  1 mg Intramuscular PRN Mert Gore MD        dextrose 5 % solution  100 mL/hr Intravenous PRN Metr Gore MD        [START ON 7/15/2020] clopidogrel (PLAVIX) tablet 75 mg  75 mg Oral Daily Mert Gore MD        0.9 % sodium chloride bolus  20 mL Intravenous Once Mert Gore MD        melatonin tablet 5 mg  5 mg Oral Nightly PRN Jenniffer Media, APRN - CNP        atorvastatin (LIPITOR) tablet 80 mg  80 mg Oral Nightly Jenniffer Media, APRN - CNP        ketorolac (TORADOL) injection 15 mg  15 mg Intravenous Q6H PRN Mert Gore MD        allopurinol (ZYLOPRIM) tablet 300 mg  300 mg Oral Daily Mert Gore MD   300 mg at 07/14/20 6581    levothyroxine (SYNTHROID) tablet 50 mcg  50 mcg Oral Daily Mert Gore MD   50 mcg at 07/14/20 1313     Review of Systems   Constitutional: Negative. Respiratory: Negative. Cardiovascular: Negative. Gastrointestinal: Negative. Neurological: Negative.       Objective:     Telemetry monitor: SR    Physical Exam:  Constitutional:  Comfortable and alert, NAD, appears stated age  Eyes: PERRL, sclera nonicteric  Neck:  Supple, no masses, no thyroidmegaly, JVD difficult to assess  Skin:  Warm and dry; no rash or lesions; +sternal incision  Heart:  Regular, normal apex, S1 and S2 normal, no M/G/R  Lungs:  Normal respiratory effort; clear; no wheezing/rhonchi/rales  Abdomen: soft, non tender, + bowel sounds  Extremities:  No edema or cyanosis; no clubbing  Neuro: alert and oriented, moves legs and arms equally, normal mood and affect  Right radial site soft, no hematoma, 2+ pulse, nontender, no ooze, moderate ecchymosis    Data Reviewed:    CABG 7/13/2020:  1. Urgent coronary artery bypass grafting surgery x2 with a single  greater saphenous vein graft to the obtuse marginal branch of the  circumflex, pedicled left internal artery to the LAD. 2.  On-pump beating heart/no cross clamp technique. 3.  Left atrial appendage obliteration with 45-mm AtriCure left atrial  clip. 4.  Sarah-Abbi catheter placement. 5.  Cardiopulmonary bypass. 6.  Endoscopic vein harvest to the right greater saphenous vein. 7.  Transesophageal echo. 8.  Epiaortic ultrasound. 9.  Doppler verification of grafts. 10.  Bilateral five-level intercostal nerve block with Exparel. 11.  Platelet-gel application. Echo 7/11/2020:   Left ventricular systolic function is normal with a visually estimated   ejection fraction of 55%. The left ventricle is normal in size with mild concentric hypertrophy. No regional wall motion abnormalities are noted. Diastolic filling parameters suggests impaired left ventricular relaxation. Coronary angiogram 7/9/2020:  Unstable angina  PROCEDURES PERFORMED    Left heart catheterization  LVgram  Coronary angiogam  Coronary cath  PROCEDURE DESCRIPTION   Risks/benefits/alternatives/outcomes were discussed with patient and/or family and informed consent was obtained. Using the [facility] scale, the patient's right radial artery was found to be a level B. Patient was prepped draped in the usual sterile fashion. Local anaesthetic was applied over puncture site. Using a back wall technique, a 6 [place] Terumo sheath was inserted into right radial artery. Verapamil, nitroglycerin, nicardipine were administered through the sheath. Heparin was administered. Diagnostic [place] Medtronic pigtail and ultra catheters were used for diagnostic angiograms.   At the conclusion of the procedure, a TR band was placed over the puncture site and hemostasis was obtained. There were no immediate complications. I supervised sedation with versed 2 mg/fentanyl 50 Mcg during the procedure. 100 cc contrast was utilized. <20cc EBL. FINDINGS     LVEDP  15   GRADIENT ACROSS AORTIC VALVE  <10mm HG   LV FUNCTION EF 60 %   WALL MOTION  normal   MITRAL REGURGITATION  mild      LM  Proximal-mid 10 to 20% stenosis with distal 90% stenosis that extends into both the LAD and circumflex, calcified vessel.         LAD  There is a stent in the ostium of the LAD that extends to the proximal and mid segments, there is 99% ostial/proximal stenosis with mid 60% stenosis and distal 60% stenosis.     D1, D2, D3 have diffuse disease with D1 having proximal 60% stenosis and D2 and D3 have ostial 90% stenoses, these are small diagonal branches.         LCX Ostial/proximal 90% stenosis, calcified vessel, proximal 30 to 40% stenosis followed by mid-distal 60 to 70% stenosis that extends into the first obtuse marginal.  There is a bifurcation point distally in this vessel and the lower branch has ostial 70 to 80% stenosis.       RCA  Dominant, calcified, ostial/proximal 40 to 50% stenosis. Mid-distal 30% stenosis. Vessel is calcified, PDA and PLV have qrnyzufs-svi-ylxilc 20 to 30% stenoses. CONCLUSIONS:    Severe left main/LAD/circumflex disease  Refer for CABG  Continue heparin drip, hold Plavix in anticipation of surgery, will consider bridging with Integrilin/cangrelor pending removal of TR band. Echo 7/17/2019:  Normal left ventricular systolic function with ejection fraction of 55-60%. No regional wall motion abnormalites are seen. Grade I diastolic dysfunction with normal filling pressure. Mild aortic   stenosis. Mild aortic regurgitation. Systolic pulmonic artery pressure (SPAP) is normal estimated at 23 mmHg   (Right atrial pressure of 3 mmHg).     Lab Reviewed:     Renal Profile:  Lab Results   Component Value Date    CREATININE 0.9 07/14/2020    BUN 13 07/14/2020     07/14/2020    K 4.3 07/14/2020    K 3.3 07/09/2020     07/14/2020    CO2 23 07/14/2020     CBC:    Lab Results   Component Value Date    WBC 10.1 07/14/2020    RBC 3.18 07/14/2020    HGB 10.1 07/14/2020    HCT 29.6 07/14/2020    MCV 92.9 07/14/2020    RDW 14.5 07/14/2020     07/14/2020     BNP:  No results found for: PROBNP  Fasting Lipid Panel:    Lab Results   Component Value Date    CHOL 78 07/13/2020    HDL 25 07/13/2020    TRIG 104 07/13/2020     Cardiac Enzymes:  CK/MbTroponin  Lab Results   Component Value Date    TROPONINI 0.04 07/08/2020     PT/ INR   Lab Results   Component Value Date    INR 1.22 07/14/2020    INR 1.62 07/13/2020    INR 1.15 07/13/2020    PROTIME 14.2 07/14/2020    PROTIME 18.9 07/13/2020    PROTIME 13.4 07/13/2020     PTT No results found for: PTT   Lab Results   Component Value Date    MG 2.20 07/14/2020    No results found for: TSH    All labs and imaging reviewed today    Assessment:  Unstable angina  CAD: s/p CABG x2 and EDWINA clip 7/13/2020; s/p orbital atherectomy and BRET x2 in 2016 and repeat in 2019 with BRET x3  HTN: better controlled  HLD: controlled, LDL 37, continue statin    Plan:   1. Continue aspirin, statin  2. Diuresis  3. Add beta blocker as BP tolerates  4. Post op management per CT surgery  5.  Anticipate rehab at discharge    Monty Hardy[address]  [phone]

## 2020-07-14 NOTE — PROGRESS NOTES
Physical Therapy    Facility/Department: [facility] B2 - ICU  Initial Assessment    NAME: Michelle Suarez  : 1933  MRN: [id number]    Date of Service: 2020    Discharge Recommendations:  Subacute/Skilled Nursing Facility   PT Equipment Recommendations  Equipment Needed: No(TBD at SNF)    Assessment   Body structures, Functions, Activity limitations: Decreased functional mobility ; Decreased strength;Decreased endurance;Decreased balance; Increased pain  Assessment: Pt referred for PT evaluation during current hospital stay with dx of CAD, s/p CABG x 2 surgery on 20. Pt currently functioning below his baseline post-surgery, requiring Jane x 2 for sit<>stand transfers and Jane x 1 for amb over level surfaces. Recommend pt D/C to SNF for further therapy at D/C, as pt's wife is unable to provide physical assist at home. Treatment Diagnosis: Decreased endurance and (I) with mobility  Specific instructions for Next Treatment: Progress ther ex and mobility as tolerated  Prognosis: Good  Decision Making: Medium Complexity  PT Education: Goals;PT Role;Plan of Care;Precautions;Transfer Training;Energy Conservation; Functional Mobility Training;Disease Specific Education;Gait Training;General Safety;Equipment;Home Exercise Program;Weight-bearing Education  Patient Education: Pt verbalizes understanding. Barriers to Learning: Decreased motivation at times  REQUIRES PT FOLLOW UP: Yes  Activity Tolerance: Patient Tolerated treatment well;Patient limited by endurance  Activity Tolerance: VSS during activity while on 6L O2. Patient Diagnosis(es): The primary encounter diagnosis was Chest pain, unspecified type. A diagnosis of Unstable angina (HCC) was also pertinent to this visit. has a past medical history of Arthritis, Bell's palsy, CAD (coronary artery disease), Gout, Hypertension, Kidney stone, Thyroid disease, and UTI (urinary tract infection).    has a past surgical history that includes joint replacement (Left); Coronary angioplasty with stent (07/25/2019); Coronary angioplasty with stent (12/22/2016); and Coronary artery bypass graft (N/A, 7/13/2020). Restrictions  Restrictions/Precautions  Restrictions/Precautions: Cardiac, General Precautions, Fall Risk  Position Activity Restriction  Sternal Precautions: No Pushing, No Pulling, 5# Lifting Restrictions  Other position/activity restrictions: Medium fall risk per nursing assessment, ambulate pt, up in chair for meals; IJ, telemetry with ICU monitoring, Dickey, 2 chest tubes, surgical drain RLE, 6L O2  Vision/Hearing  Vision: Impaired  Vision Exceptions: Wears glasses for reading  Hearing: Exceptions to [facility]  Hearing Exceptions: Hard of hearing/hearing concerns; No hearing aid     Subjective  General  Chart Reviewed: Yes  Patient assessed for rehabilitation services?: Yes  Family / Caregiver Present: No  Referring Practitioner: Shruthi Lee MD  Referral Date : 07/13/20  Diagnosis: CAD, s/p CABG x 2 on 7/13/20  Follows Commands: Within Functional Limits  General Comment  Comments: Pt sitting up in chair upon entry of therapy staff  Subjective  Subjective: Pt agreeable to work with PT/OT this morning, very pleasant and cooperative. States he feels he'll need to go to rehab after D/C from hospital.  \"My wife has memory troubles, and she won't be able to help me with much. \"  Pain Screening  Patient Currently in Pain: Yes  Pain Assessment  Pain Assessment: 0-10  Pain Level: 8  Pain Type: Surgical pain  Pain Location: Sternum  Pain Orientation: Mid  Pain Descriptors: Sore;Aching  Pain Frequency: Continuous  Non-Pharmaceutical Pain Intervention(s): Ambulation/Increased Activity;Repositioned; Emotional support  Intervention List: Patient able to continue with treatment    Orientation  Orientation  Overall Orientation Status: Within Functional Limits     Social/Functional History  Social/Functional History  Lives With: Spouse(wife (pt thinks she's starting to get dementia - \"her memory is getting worse and worse\"))  Type of Home: House  Home Layout: One level  Home Access: Ramped entrance  Bathroom Shower/Tub: Walk-in shower, Tub/Shower unit(walk-in shower has small lip pt steps over upon entry)  Bathroom Toilet: Handicap height(comfort-height)  Bathroom Equipment: Grab bars in shower, Shower chair  Home Equipment: Standard walker, Rolling walker, Cane, Grab bars  Receives Help From: Family, Other (comment)(pt's son occasionally helps with laundry although not on a regular basis, pt recently started the process of hiring outside help with cleaning)  ADL Assistance: Independent  Homemaking Assistance: Independent  Ambulation Assistance: Independent(typically without AD, occasionally furniture-walks around the house)  Transfer Assistance: Independent  Active : Yes  Occupation: Retired  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[address]: Watching TV, [facility] shows & movies    Objective  Observation/Palpation  Posture: Fair    RLE AROM: WFL  LLE AROM : WFL  RLE Strength: Grossly 4-/5 throughout  LLE Strength: Grossly 4-/5 throughout    Bed mobility  Supine to Sit: Unable to assess(pt up in chair before & after therapy)     Transfers  Sit to Stand: 2 Person Assistance(Jane x 2 from chair to 0PT)  Stand to sit: 2 Person Assistance(Jane x 2 from 4WW to chair)  Bed to Chair: Unable to assess(pt up in chair before & after therapy)     Ambulation  Surface: level tile  Device: Rollator  Other Apparatus: O2(6L O2)  Assistance: Minimal assistance  Quality of Gait: Pt amb with slow daiana with increased B stance time and slightly labored gait. Occasional c/o dizziness while amb although complaints did not worsen over the duration of amb. Mildly unsteady. Gait Deviations: Slow Daiana;Decreased step length;Decreased step height  Distance: x 50 feet  Comments: Amb distance limited by c/o fatigue and mild dizziness.     Balance  Posture: Fair  Sitting - Static:

## 2020-07-14 NOTE — PROGRESS NOTES
Report received at the bedside with Suzanne RN, pt up to chair, NP Alanis rounded this morning, added I Grey as well. Pt has pain see MAR medication given. Pt is on Insulin gtt as well mult .04, pt denies needs at this time.

## 2020-07-14 NOTE — PROGRESS NOTES
4 Eyes Skin Assessment     The patient is being assess for   Shift Handoff    I agree that 2 RN's have performed a thorough Head to Toe Skin Assessment on the patient. ALL assessment sites listed below have been assessed. Areas assessed by both nurses:   [x]   Head, Face, and Ears   [x]   Shoulders, Back, and Chest, Abdomen  [x]   Arms, Elbows, and Hands   [x]   Coccyx, Sacrum, and Ischium  [x]   Legs, Feet, and Heels            **SHARE this note so that the co-signing nurse is able to place an eSignature**    Co-signer eSignature: Electronically signed by Benson Farooq RN on 7/14/20 at 7:46 AM EDT    Does the Patient have Skin Breakdown?   Yes LDA WOUND CARE was Initiated documentation include the Zina-wound, Wound Assessment, Measurements, Dressing Treatment, Drainage, and Color\",          Brady Prevention initiated:  Yes   Wound Care Orders initiated:  No      WOC nurse consulted for Pressure Injury (Stage 3,4, Unstageable, DTI, NWPT, Complex wounds)and New or Established Ostomies:  No      Primary Nurse eSignature: Electronically signed by Charity Jimenez RN on 7/14/20 at 7:46 AM EDT

## 2020-07-14 NOTE — OP NOTE
[facility]                                OPERATIVE REPORT    PATIENT NAME: Ever Valenzuela                          :        1933  MED REC NO:   [id number]                          ROOM:       9273  ACCOUNT NO:   [de-identified]                           ADMIT DATE: 2020  PROVIDER:     Janet Andrade. Kristen Christianson MD    DATE OF PROCEDURE:  2020    PREOPERATIVE DIAGNOSES:  1. Coronary artery disease. 2.  Unstable angina. 3.  Non ST-segment elevation MI.  4.  Severe left main trunk stenosis. 5.  Hypertension. 6.  Dyslipidemia. POSTOPERATIVE DIAGNOSES:  1. Coronary artery disease. 2.  Unstable angina. 3.  Non ST-segment elevation MI.  4.  Severe left main trunk stenosis. 5.  Hypertension. 6.  Dyslipidemia. OPERATION PERFORMED:  1. Urgent coronary artery bypass grafting surgery x2 with a single  greater saphenous vein graft to the obtuse marginal branch of the  circumflex, pedicled left internal artery to the LAD. 2.  On-pump beating heart/no cross clamp technique. 3.  Left atrial appendage obliteration with 45-mm AtriCure left atrial  clip. 4.  [place]-Abbi catheter placement. 5.  Cardiopulmonary bypass. 6.  Endoscopic vein harvest to the right greater saphenous vein. 7.  Transesophageal echo. 8.  Epiaortic ultrasound. 9.  Doppler verification of grafts. 10.  Bilateral five-level intercostal nerve block with Exparel. 11.  Platelet-gel application. SURGEON:  Tyrell Cueva MD    ASSISTANTS:  [id number] Sosa ; Gerald Johnston SA.    ANESTHESIA:  General endotracheal anesthesia per Dr. Daniel Fournier. INDICATIONS AND CONSENT:  The patient is an 68-year-old gentleman with a  known history of coronary artery disease. He had a coronary stent  placed remotely in 2016 PCI of his LAD. On this occasion, he presented  with substernal chest pressure.   He was admitted to the emergency room  and found to have EKG and enzyme change consistent with non-ST-segment  elevation MI. Left heart catheterization demonstrated severe left main  trunk stenosis. He has been on Plavix. This was discontinued and was  placed on cangrelor. I saw the patient in consultation. I examined the  patient and reviewed his imaging studies. I recommended coronary bypass  grafting surgery. I discussed this operation with him. I discussed the  risks, benefits and alternatives of the surgery including risk of  infection, bleeding, stroke, MI, arrhythmias, and operative mortality  risk in the 2% to 3% range. Questions were answered and consent was  obtained to proceed with surgery. INTRAOPERATIVE FINDINGS:  Epiaortic ultrasound which I independently  performed and interpreted based my surgical plans on showed a very  heavily calcified ascending aorta, essentially egg shell. Couple of  small spots that one could be cannulated, one for proximal anastomosis  down by the root. Transesophageal echo placed by Anesthesia, also  reviewed by myself preprocedurally showed no mitral regurg, no aortic  insufficiency, no interatrial septal defect and no clot in the left  atrial appendage. Postprocedural ELISEO showed preserved global systolic  function. Ejection fraction remained about the same 55% to 60%. No  mitral regurg. No aortic insufficiency. Because of his heavily calcified aorta, did an on-pump beating heart. Pump time was 150 minutes, cross clamp time was not applicable. Ancef  and vancomycin were both administered prior to incision. IV Tylenol and  Amicar administered. OPERATIVE PROCEDURE:  The patient was brought to the operating room and  placed on the operating table in the supine position. Right brachial  arterial monitoring line was placed. General endotracheal anesthesia  was accomplished. Dickey catheter was placed. Right internal jugular  Scott-Abbi catheter was successfully positioned.   The chest, abdomen,  groin and legs were all prepped with DuraPrep and draped in the usual  sterile fashion for open-heart surgery. A time-out process was carried  out appropriately identifying the patient and the procedure. The heart was exposed through a median sternotomy incision while a  portion of the right greater saphenous vein was harvested using  endoscopic vein harvest technique from the right leg. This was  accomplished successfully. A Derek-Tirado drain was placed and the leg  closed. Simultaneously, the left internal mammary artery was taken down  from its position underneath the sternum with electrocautery. Systemic  heparin was administered. Distal end of the DUSTIN clipped with surgical  clips, divided, and the stump oversewn with Ethibond suture and the  pedicle placed in the left upper chest until needed for bypass. The pericardium was opened longitudinally and tacked up laterally  creating a pericardial cradle. The ascending aorta was inspected with  the epiaortic ultrasound, which I independently performed and  interpreted based on my surgical plans on. Seeing the findings as  outlined above, the ascending aorta was marked and cannulated at  appropriate site. Three-stage single venous cannula was placed through  the right atrial appendage into the inferior vena cava. The patient was  placed on bypass. The right pericardium was let down. The heart was  mobilized. He has Medtronic Octopus and Starfish system. The OM was  stabilized. The vein prepared. Vessel opened longitudinally. Proximal  occluding, Silastic tape was placed and the vein joined to the artery  end-to-side with 7-0 Prolene suture in running fashion. Anastomosis was  checked for hemostasis and found to be satisfactory. The patient did  fibrillate, but he was cardioverted back to sinus rhythm with 10 joules  of direct cardioversion and remained stable thereafter.   The vein was  brought to the left side of the aorta where protamine,  returning ACT towards its baseline. As I did that, the aortic cannula  was removed and the pursestring suture securing it was tied x2 and  reinforced with a pledgeted 3-0 Prolene suture, placed in a horizontal  mattress fashion. Doppler was used to check all the grafts, found to have satisfactory  systolic and diastolic signals. At this point, we achieved hemostasis and once found to be satisfactory,  the pericardium was reapproximated over the ascending aorta as well as  the base of the heart and the diaphragm. Platelet-poor gel was placed  inside the pericardium as well as underneath the left chest wall in the  DUSTIN bed. Platelet-rich gel between the sternum as it was closed. The  sternum was reapproximated with stainless steel wires. The intercostal  spaces were infiltrated bilaterally at five levels each with a total of  90 mL of standard Exparel solution. The sternum wound was irrigated  with copious amounts of warm saline and closed in appropriate layers  with Vicryl suture including subcuticular stitch in the skin. ESTIMATED BLOOD LOSS:  500 mL. REPLACEMENTS:  One pool of platelets, one unit of FFP, five units of  cryoprecipitate. HARRISON Yo MD    D: 07/13/2020 12:25:20       T: 07/13/2020 20:13:19     [id number]  Job#: [id number]     Doc#: [id number]    CC:  Tico Flores.  Olivia Hernandez MD       Primary Care Physician       Moises Ortiz MD

## 2020-07-14 NOTE — PROGRESS NOTES
Patient met criteria per open heart protocol to transition to stepdown level of care. Procedures explained to patient. Vester Crape discontinued and obturator inserted. Patient tolerated well and minimal ectopy on monitor. Brachial arterial line discontinued. Manual pressure held for 5 minutes and tegaderm on site. No hematoma, no oozing noted. Patient tolerated well.

## 2020-07-14 NOTE — PROGRESS NOTES
Keep right ankle elevated above the level of the heart while at home for the next 48 hours.    Ice to ankle for 20 minutes every 1-2 hours while at home for the next 24-48 hours.    Ibuprofen every 6 hours while awake for the next 24-48 hours, then as needed.    Keep ACE wrap on ankle at all times for the next 48 hours, then as needed for comfort.    Avoid jumping and vigorous running for the rest of the week.      * Sprain, Ankle (Child)  Ligaments are strong bands of tissue that connect bone to bone. If an ankle ligament is stretched or torn, it is called an ankle sprain. Sprains can be mild, moderate, or severe.  Ankle sprains cause mild aching or sudden pain. The injured ankle is painful to move. The pain is more severe when weight is put on the ankle. The ankle may be swollen and discolored.  Ankle sprains in children generally heal quickly and create few problems. It may take several days before the child can put weight on the affected foot. A bandage or splint may be put on the leg and foot to keep the ankle from moving and allow it to heal. Your child may be given crutches so he or she can move around without putting weight on the foot. Cold compresses and leg elevation help reduce swelling. Anti-inflammatory pain medication may be given.  Home Care:  Medications: The doctor may prescribe a medication for pain and inflammation. Follow the doctor s instructions when giving this medication to your child.  General Care:  1. Have your child rest as needed.  2. Use the bandage or splint as recommended by your doctor.  3. Apply cold (ice, a cold pack, or a package of frozen vegetables) wrapped in a clean cloth to the affected ankle for no more than 20 minutes at a time, 3-4 times a day. Continue icing for 2 to 3 days or until the pain goes away.  4. To help reduce swelling, elevate the affected ankle above the level of the heart. Have your child lie down with a pillow under the foot as often as possible,  Occupational Therapy   Occupational Therapy Initial Assessment/Treatment  Date: 2020   Patient Name: Mychal Grove  MRN: [id number]     : 1933    Date of Service: 2020    Discharge Recommendations:  Subacute/Skilled Nursing Facility       Assessment   Performance deficits / Impairments: Decreased functional mobility ; Decreased ADL status; Decreased balance;Decreased endurance;Decreased strength;Decreased safe awareness;Decreased high-level IADLs  After evaluation POD#1 s/p CABG x 2, pt found to be presenting with the above mentioned occupational performance deficits which are affecting participation in daily living skills. PTA patient independent living at home with wife, however per patient report spouse with memory loss. Pt max-totalA for LE ADLS, Jane of 2 to stand for functional mobility with 4ww. Pt would benefit from continued skilled occupational therapy to address ADLs, functional mobility, and safety while in acute care. Prognosis: Good  Decision Making: High Complexity  OT Education: Plan of Care;Precautions;OT Role;Transfer Training;Equipment  REQUIRES OT FOLLOW UP: Yes  Activity Tolerance  Activity Tolerance: Patient limited by fatigue;Patient Tolerated treatment well  Activity Tolerance: O2 sats mid 90's throughout session on 6L. Pt did report SOB. Safety Devices  Safety Devices in place: Yes  Type of devices: Left in chair;Call light within reach; Chair alarm in place;Gait belt           Patient Diagnosis(es): The primary encounter diagnosis was Chest pain, unspecified type. A diagnosis of Unstable angina (HCC) was also pertinent to this visit. has a past medical history of Arthritis, Bell's palsy, CAD (coronary artery disease), Gout, Hypertension, Kidney stone, Thyroid disease, and UTI (urinary tract infection). has a past surgical history that includes joint replacement (Left); Coronary angioplasty with stent (2019);  Coronary angioplasty with stent (2016); and especially while icing.  Follow Up  As advised by the doctor or our staff. Your child may be referred to an orthopaedic doctor for further evaluation. Be sure to visit this doctor promptly.  Call Your Doctor Or Get Prompt Medical Attention  if any of the following occurs:    Fever greater than 101 F (38.3 C)    Injury does not appear to be healing    Continued pain and swelling    Difficulty moving injured area    Skin discoloration, blisters, or irritation    [phone] [address], [place] [address]. All rights reserved. This information is not intended as a substitute for professional medical care. Always follow your healthcare professional's instructions.         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 ADL  UE Dressing: Moderate assistance  LE Dressing: Maximum assistance  Toileting: Dependent/Total     Tone RUE  RUE Tone: Normotonic  Tone LUE  LUE Tone: Normotonic  Coordination  Movements Are Fluid And Coordinated: Yes     Bed mobility  Comment: pt up in chair pre/post session. Transfers  Sit to stand: 2 Person assistance;Minimal assistance(up to 4ww)  Stand to sit: 2 Person assistance;Minimal assistance     Cognition  Overall Cognitive Status: Exceptions  Arousal/Alertness: Appropriate responses to stimuli  Following Commands: Follows multistep commands with increased time; Follows multistep commands consistently  Attention Span: Attends with cues to redirect  Safety Judgement: Decreased awareness of need for assistance;Decreased awareness of need for safety  Problem Solving: Decreased awareness of errors  Insights: Decreased awareness of deficits  Initiation: Requires cues for some  Sequencing: Requires cues for some  Cognition Comment: Pt requiring mild encouragement to ambulate this date.       Exercises  Finger Flexion: x 10 reps  Finger Extension: x 10 reps     LUE AROM (degrees)  LUE AROM : WFL  RUE AROM (degrees)  RUE AROM : WFL  LUE Strength  Gross LUE Strength: WFL  RUE Strength  Gross RUE Strength: WFL         Plan   Plan  Times per week: 4-5x's a week while in ICU             AM-PAC Score        AM-PAC Inpatient Daily Activity Raw Score: 13 (07/14/20 1239)  AM-PAC Inpatient ADL T-Scale Score : 32.03 (07/14/20 1239)  ADL Inpatient CMS 0-100% Score: 63.03 (07/14/20 1239)  ADL Inpatient CMS G-Code Modifier : CL (07/14/20 1239)    Goals  Short term goals  Time Frame for Short term goals: 1 week unless otherwise specified 7/21  Short term goal 1: Pt will complete LE Dressing with Jane  Short term goal 2: Pt will complete toilet transfers with CGA of 1 by 7/19  Short term goal 3: Pt will complete oral care at sink with CGA for standing balance  Patient Goals   Patient goals : \"to get stronger and go to rehab so I can go home\"       Therapy Time   Individual Concurrent Group Co-treatment   Time In 0920         Time Out 1008         Minutes 48         Timed Code Treatment Minutes: [facility], OTR/L  If pt is unable to be seen after this session, please let this note serve as discharge summary. Please see case management note for discharge disposition. Thank you.

## 2020-07-14 NOTE — PROGRESS NOTES
CVTS Cardiothoracic Progress Note:                                CC:  Post op follow up     Surgery: 7/13 Urgent CABG X 2, with pedicled LIMA to LAD, single Greater Saphenous VG to OM, EDWINA obliteration with 45 mm Atricure LA Clip, SGC, CPB, EVH Right Greater Saphenous vein, ELISEO, Epiaortic ultrasound, Doppler verification of grafts, Bilateral 5 level intercostal nerve block(Exparel), Platelet gel application. Hospital course:  7/14 Sitting up in chair, c/o pain to CT site overnight.      Subjective:   Dietary Intake: good   No Nausea   Pain Control: expresses some discomfort overnight, PRN pain meds   Complaints: pain at chest tube site over night   Bowels: no BM, active bowel sounds       Past Medical History:   Diagnosis Date    Arthritis     Bell's palsy     CAD (coronary artery disease)     Gout     Hypertension     Kidney stone     Thyroid disease     UTI (urinary tract infection)         Past Surgical History:   Procedure Laterality Date    CORONARY ANGIOPLASTY WITH STENT PLACEMENT  07/25/2019    Dr Bradley Zaragoza, Rotational Atherectomy and BRET x 3 to LM/LAD    CORONARY ANGIOPLASTY WITH STENT PLACEMENT  12/22/2016    Dr Bradley Zaragoza, Orbital Atherectomy and BRET x 2 to LAD - Synergy 2.75x16 and 3x8, post dilated to 3mm NC.     CORONARY ARTERY BYPASS GRAFT N/A 7/13/2020    CORONARY ARTERY BYPASS GRAFTING X2, INTERNAL MAMMARY ARTERY, SAPHENOUS VEIN GRAFT, ON PUMP BEATING HEART,  45MM EDWINA CLIP, 5 LEVEL BILATERAL INTERCOSTAL NERVE BLOCK performed by Lor Sultana MD at [address] [place] [address] Left         Allergies as of 07/08/2020    (No Known Allergies)        Patient Active Problem List   Diagnosis    Coronary artery disease involving native coronary artery of native heart with unstable angina pectoris ([address]    Essential hypertension    Mixed hyperlipidemia    Obesity    Angina, class III ([address]    Unstable angina ([address]    Chest pain    NSTEMI (non-ST elevated myocardial infarction) ([address]    Dyslipidemia    Left main coronary artery disease        Vital Signs: /70   Pulse 109   Temp 99 °F (37.2 °C) (Cerebral)   Resp 16   Ht 5' 9\" (1.753 m)   Wt 212 lb 9.6 oz (96.4 kg)   SpO2 96%   BMI 31.40 kg/m²  O2 Flow Rate (L/min): 6 L/min     Admission Weight: Weight: 202 lb (91.6 kg)    Weight on 7/13 (88.5 kg) Pre-op   7/14 96.4 kg     Intake/Output:     Intake/Output Summary (Last 24 hours) at 7/14/2020 1009  Last data filed at 7/14/2020 0636  Gross per 24 hour   Intake 5606 ml   Output 5065 ml   Net 541 ml        GTTS: insulin. Milrinone and levo off. Extubation Time: 7/13 @ 1226 PM   Transition Time: 7/14 @ 2:24 AM     LABORATORY DATA:     CBC:   Recent Labs     07/13/20 0341 07/13/20 1251 07/13/20 1846 07/14/20 0120 07/14/20  0525   WBC 9.0  --  21.1*  --   --  10.1   HGB 14.0   < > 12.8* 10.9* 9.9* 10.1*   HCT 40.9  --  37.6* 31.7* 28.5* 29.6*   MCV 91.1  --  92.2  --   --  92.9   *  --  85*  --   --  129*    < > = values in this interval not displayed. BMP:   Recent Labs     07/13/20 0341 07/13/20 1251 07/13/20 1846 07/14/20  0120 07/14/20  0525    135*  --   --  139   K 3.7 4.0 4.3 4.1 4.3    102  --   --  105   CO2 22 20*  --   --  23   BUN 16 11  --   --  13   CREATININE 0.8 0.6*  --   --  0.9     MG:    Recent Labs     07/13/20 1251 07/13/20 1846 07/14/20  0525   MG 1.80 1.80 2.20      Cardiac Enzymes: No results for input(s): CKTOTAL, CKMB, CKMBINDEX, TROPONINI in the last 72 hours.   PT/INR:   Recent Labs     07/13/20  0341 07/13/20  1251 07/14/20  0525   PROTIME 13.4* 18.9* 14.2*   INR 1.15* 1.62* 1.22*     APTT:   Recent Labs     07/12/20  0435 07/13/20  0341 07/13/20  1251   APTT 31.0 30.8 35.2         CXR: 7/13/20  FINDINGS:    Cardiac silhouette is enlarged.  Left-sided chest tube, new.  [place]-Abbi    catheter with tip projecting in the region of a pulmonary artery going    towards the right lower lobe.  No pneumothorax.  Small right pleural effusion.  Pulmonary vascular congestion. ________________________________________________________________________      Objective:   General appearance: awake, alert, in nad   Lungs: diminished, crackles left base   Heart: S1S2 regular rate and rhythm; ST on monitor,   Chest: symmetrical expansion with inspiration and expirations; no rocking of sternum noted   Abdomen: soft, non-tender   Bowel sounds: normoactive   Kidneys: UOP 4605 for the past 24 hours. Cr 0.9  Wound/Incisions: Midsternal incision CDI; RLE incisions with dressings CDI; Pacing wires intact and secured   Extremities: BLE pulses present; 1+ swelling noted in BLE  Neurological: awake, alert, no focal deficits   Chest tubes/Drains: Chest tube # 1 with 130-60-80 (270) ml serosanguinous drainage over 24 hours; Chest tube # 2 with 100-160-80 (340) ml serosanguinous drainage over 24 hours; no airleaks noted in either tube     _Assessment:   Post-op: 1 days. Condition: In stable condition. Plan:   1. Cardiovascular: S/P CABG x 2 with EDWINA clip. ASA, statin, BB. HR low 100s. Will increase BB this AM.     2. Pulmonary: expansion measures- IS, OOBTC, PT/OT, ambulation     3. Neurology: continue analgesia as needed     4. Nephrology: fluid management- diurese as tolerated. Lasix IV BID.     5. Endocrinology: insulin gtt     6. Hematology: acute blood loss anemia. H & H stable. 7. Microbiology: no issues at present. 7. Nutrition: ADAT    8. Labs: ionized ca 0.95- replace     9. Post-op Drains/Wires: will keep TPW and CT in today. D/c jennings today. 10. D/C Goals: D/C planning following.      11. Continue post-op care of patient in the ICU    Meds:    The patient is on a beta-blocker   The patient is not on an ace-i/ARB- not indicated    The patient is on a statin   The patient is on oral antiplatelet therapy     Sharon Mansfield, CNP  7/14/2020  10:09 AM

## 2020-07-14 NOTE — PROGRESS NOTES
4 Eyes Skin Assessment     The patient is being assess for   Shift Handoff    I agree that 2 RN's have performed a thorough Head to Toe Skin Assessment on the patient. ALL assessment sites listed below have been assessed. Areas assessed by both nurses:   [x]   Head, Face, and Ears   [x]   Shoulders, Back, and Chest, Abdomen  [x]   Arms, Elbows, and Hands   [x]   Coccyx, Sacrum, and Ischium  []   Legs, Feet, and Heels              Co-signer eSignature: Electronically signed by Tonya Alegre RN on 7/14/20 at 7:22 PM EDT    Does the Patient have Skin Breakdown?   No          Brady Prevention initiated:  NA   Wound Care Orders initiated:  NA      WOC nurse consulted for Pressure Injury (Stage 3,4, Unstageable, DTI, NWPT, Complex wounds)and New or Established Ostomies:  NA      Primary Nurse eSignature: Electronically signed by Benita Valdez RN on 7/14/20 at 7:20 PM EDT

## 2020-07-14 NOTE — PLAN OF CARE
Problem: Falls - Risk of:  Goal: Will remain free from falls  Description: Will remain free from falls  Outcome: Ongoing     Problem: Pain:  Goal: Pain level will decrease  Description: Pain level will decrease  Outcome: Ongoing  Goal: Control of acute pain  Description: Control of acute pain  Outcome: Ongoing     Problem: Pain:  Goal: Control of acute pain  Description: Control of acute pain  Outcome: Ongoing     Problem: Preoperative Routine:  Goal: Will comply with preparation for surgery or procedure  Description: Will comply with preparation for surgery or procedure  Outcome: Ongoing

## 2020-07-15 LAB
ANION GAP SERPL CALCULATED.3IONS-SCNC: 12 MMOL/L (ref 3–16)
BLOOD BANK DISPENSE STATUS: NORMAL
BLOOD BANK DISPENSE STATUS: NORMAL
BLOOD BANK PRODUCT CODE: NORMAL
BLOOD BANK PRODUCT CODE: NORMAL
BPU ID: NORMAL
BPU ID: NORMAL
BUN BLDV-MCNC: 21 MG/DL (ref 7–20)
CALCIUM SERPL-MCNC: 8.7 MG/DL (ref 8.3–10.6)
CHLORIDE BLD-SCNC: 103 MMOL/L (ref 99–110)
CO2: 24 MMOL/L (ref 21–32)
CREAT SERPL-MCNC: 0.9 MG/DL (ref 0.8–1.3)
DESCRIPTION BLOOD BANK: NORMAL
DESCRIPTION BLOOD BANK: NORMAL
GFR AFRICAN AMERICAN: >60
GFR NON-AFRICAN AMERICAN: >60
GLUCOSE BLD-MCNC: 104 MG/DL (ref 70–99)
GLUCOSE BLD-MCNC: 118 MG/DL (ref 70–99)
GLUCOSE BLD-MCNC: 142 MG/DL (ref 70–99)
GLUCOSE BLD-MCNC: 151 MG/DL (ref 70–99)
GLUCOSE BLD-MCNC: 165 MG/DL (ref 70–99)
GLUCOSE BLD-MCNC: 169 MG/DL (ref 70–99)
HCT VFR BLD CALC: 27.4 % (ref 40.5–52.5)
HEMOGLOBIN: 9.3 G/DL (ref 13.5–17.5)
MAGNESIUM: 1.9 MG/DL (ref 1.8–2.4)
MCH RBC QN AUTO: 31.6 PG (ref 26–34)
MCHC RBC AUTO-ENTMCNC: 33.9 G/DL (ref 31–36)
MCV RBC AUTO: 93.2 FL (ref 80–100)
PDW BLD-RTO: 14.6 % (ref 12.4–15.4)
PERFORMED ON: ABNORMAL
PLATELET # BLD: 113 K/UL (ref 135–450)
PMV BLD AUTO: 9.2 FL (ref 5–10.5)
POTASSIUM SERPL-SCNC: 4.2 MMOL/L (ref 3.5–5.1)
RBC # BLD: 2.94 M/UL (ref 4.2–5.9)
SODIUM BLD-SCNC: 139 MMOL/L (ref 136–145)
WBC # BLD: 11 K/UL (ref 4–11)

## 2020-07-15 PROCEDURE — 2580000003 HC RX 258: Performed by: THORACIC SURGERY (CARDIOTHORACIC VASCULAR SURGERY)

## 2020-07-15 PROCEDURE — 93005 ELECTROCARDIOGRAM TRACING: CPT | Performed by: THORACIC SURGERY (CARDIOTHORACIC VASCULAR SURGERY)

## 2020-07-15 PROCEDURE — 6370000000 HC RX 637 (ALT 250 FOR IP): Performed by: THORACIC SURGERY (CARDIOTHORACIC VASCULAR SURGERY)

## 2020-07-15 PROCEDURE — 94761 N-INVAS EAR/PLS OXIMETRY MLT: CPT

## 2020-07-15 PROCEDURE — 97530 THERAPEUTIC ACTIVITIES: CPT

## 2020-07-15 PROCEDURE — 2000000000 HC ICU R&B

## 2020-07-15 PROCEDURE — 97116 GAIT TRAINING THERAPY: CPT

## 2020-07-15 PROCEDURE — 97535 SELF CARE MNGMENT TRAINING: CPT

## 2020-07-15 PROCEDURE — U0003 INFECTIOUS AGENT DETECTION BY NUCLEIC ACID (DNA OR RNA); SEVERE ACUTE RESPIRATORY SYNDROME CORONAVIRUS 2 (SARS-COV-2) (CORONAVIRUS DISEASE [COVID-19]), AMPLIFIED PROBE TECHNIQUE, MAKING USE OF HIGH THROUGHPUT TECHNOLOGIES AS DESCRIBED BY CMS-2020-01-R: HCPCS

## 2020-07-15 PROCEDURE — 6370000000 HC RX 637 (ALT 250 FOR IP): Performed by: NURSE PRACTITIONER

## 2020-07-15 PROCEDURE — 99024 POSTOP FOLLOW-UP VISIT: CPT | Performed by: THORACIC SURGERY (CARDIOTHORACIC VASCULAR SURGERY)

## 2020-07-15 PROCEDURE — 80048 BASIC METABOLIC PNL TOTAL CA: CPT

## 2020-07-15 PROCEDURE — 83735 ASSAY OF MAGNESIUM: CPT

## 2020-07-15 PROCEDURE — 6360000002 HC RX W HCPCS: Performed by: THORACIC SURGERY (CARDIOTHORACIC VASCULAR SURGERY)

## 2020-07-15 PROCEDURE — 99232 SBSQ HOSP IP/OBS MODERATE 35: CPT | Performed by: NURSE PRACTITIONER

## 2020-07-15 PROCEDURE — 85027 COMPLETE CBC AUTOMATED: CPT

## 2020-07-15 PROCEDURE — 2700000000 HC OXYGEN THERAPY PER DAY

## 2020-07-15 RX ORDER — AMIODARONE HYDROCHLORIDE 200 MG/1
400 TABLET ORAL 2 TIMES DAILY
Status: DISCONTINUED | OUTPATIENT
Start: 2020-07-16 | End: 2020-07-17

## 2020-07-15 RX ORDER — FAMOTIDINE 20 MG/1
20 TABLET, FILM COATED ORAL 2 TIMES DAILY
Status: DISCONTINUED | OUTPATIENT
Start: 2020-07-15 | End: 2020-07-19 | Stop reason: HOSPADM

## 2020-07-15 RX ORDER — METOPROLOL TARTRATE 50 MG/1
50 TABLET, FILM COATED ORAL 2 TIMES DAILY
Status: DISCONTINUED | OUTPATIENT
Start: 2020-07-15 | End: 2020-07-19 | Stop reason: HOSPADM

## 2020-07-15 RX ORDER — AMIODARONE HYDROCHLORIDE 200 MG/1
200 TABLET ORAL DAILY
Status: DISCONTINUED | OUTPATIENT
Start: 2020-07-21 | End: 2020-07-17

## 2020-07-15 RX ADMIN — INSULIN LISPRO 1 UNITS: 100 INJECTION, SOLUTION INTRAVENOUS; SUBCUTANEOUS at 12:48

## 2020-07-15 RX ADMIN — Medication 15 ML: at 08:32

## 2020-07-15 RX ADMIN — INSULIN GLARGINE 13 UNITS: 100 INJECTION, SOLUTION SUBCUTANEOUS at 19:57

## 2020-07-15 RX ADMIN — ACETAMINOPHEN 650 MG: 325 TABLET ORAL at 09:20

## 2020-07-15 RX ADMIN — ASPIRIN 81 MG: 81 TABLET ORAL at 15:55

## 2020-07-15 RX ADMIN — BISACODYL 5 MG: 5 TABLET, COATED ORAL at 08:31

## 2020-07-15 RX ADMIN — LEVOTHYROXINE SODIUM 50 MCG: 0.05 TABLET ORAL at 06:30

## 2020-07-15 RX ADMIN — POTASSIUM CHLORIDE 10 MEQ: 10 TABLET, EXTENDED RELEASE ORAL at 12:46

## 2020-07-15 RX ADMIN — FUROSEMIDE 40 MG: 10 INJECTION, SOLUTION INTRAMUSCULAR; INTRAVENOUS at 08:29

## 2020-07-15 RX ADMIN — ONDANSETRON 4 MG: 2 INJECTION INTRAMUSCULAR; INTRAVENOUS at 19:18

## 2020-07-15 RX ADMIN — ACETAMINOPHEN 650 MG: 325 TABLET ORAL at 19:52

## 2020-07-15 RX ADMIN — CEFAZOLIN SODIUM 2 G: 10 INJECTION, POWDER, FOR SOLUTION INTRAVENOUS at 03:20

## 2020-07-15 RX ADMIN — FONDAPARINUX SODIUM 2.5 MG: 2.5 INJECTION, SOLUTION SUBCUTANEOUS at 15:55

## 2020-07-15 RX ADMIN — MAGNESIUM GLUCONATE 500 MG ORAL TABLET 400 MG: 500 TABLET ORAL at 08:30

## 2020-07-15 RX ADMIN — METOPROLOL TARTRATE 50 MG: 50 TABLET, FILM COATED ORAL at 09:15

## 2020-07-15 RX ADMIN — OXYCODONE 10 MG: 5 TABLET ORAL at 18:01

## 2020-07-15 RX ADMIN — MORPHINE SULFATE 2 MG: 2 INJECTION, SOLUTION INTRAMUSCULAR; INTRAVENOUS at 13:01

## 2020-07-15 RX ADMIN — POTASSIUM CHLORIDE 10 MEQ: 10 TABLET, EXTENDED RELEASE ORAL at 08:31

## 2020-07-15 RX ADMIN — Medication 15 ML: at 19:54

## 2020-07-15 RX ADMIN — FUROSEMIDE 40 MG: 10 INJECTION, SOLUTION INTRAMUSCULAR; INTRAVENOUS at 19:52

## 2020-07-15 RX ADMIN — POLYETHYLENE GLYCOL 3350 17 G: 17 POWDER, FOR SOLUTION ORAL at 08:29

## 2020-07-15 RX ADMIN — CLOPIDOGREL BISULFATE 75 MG: 75 TABLET ORAL at 15:55

## 2020-07-15 RX ADMIN — Medication 10 ML: at 19:54

## 2020-07-15 RX ADMIN — MUPIROCIN: 20 OINTMENT TOPICAL at 19:54

## 2020-07-15 RX ADMIN — FAMOTIDINE 20 MG: 20 TABLET, FILM COATED ORAL at 08:31

## 2020-07-15 RX ADMIN — DEXTROSE MONOHYDRATE 1 MG/MIN: 50 INJECTION, SOLUTION INTRAVENOUS at 20:39

## 2020-07-15 RX ADMIN — POTASSIUM CHLORIDE 10 MEQ: 10 TABLET, EXTENDED RELEASE ORAL at 17:35

## 2020-07-15 RX ADMIN — MUPIROCIN: 20 OINTMENT TOPICAL at 08:32

## 2020-07-15 RX ADMIN — OXYCODONE 10 MG: 5 TABLET ORAL at 08:29

## 2020-07-15 RX ADMIN — AMIODARONE HYDROCHLORIDE 150 MG: 50 INJECTION, SOLUTION INTRAVENOUS at 20:26

## 2020-07-15 RX ADMIN — FAMOTIDINE 20 MG: 20 TABLET, FILM COATED ORAL at 19:53

## 2020-07-15 RX ADMIN — Medication 10 ML: at 08:34

## 2020-07-15 RX ADMIN — METOPROLOL TARTRATE 50 MG: 50 TABLET, FILM COATED ORAL at 19:53

## 2020-07-15 RX ADMIN — ATORVASTATIN CALCIUM 80 MG: 80 TABLET, FILM COATED ORAL at 19:53

## 2020-07-15 RX ADMIN — ALLOPURINOL 300 MG: 300 TABLET ORAL at 08:31

## 2020-07-15 ASSESSMENT — PAIN SCALES - GENERAL
PAINLEVEL_OUTOF10: 7
PAINLEVEL_OUTOF10: 6
PAINLEVEL_OUTOF10: 9
PAINLEVEL_OUTOF10: 6
PAINLEVEL_OUTOF10: 6

## 2020-07-15 ASSESSMENT — ENCOUNTER SYMPTOMS
RESPIRATORY NEGATIVE: 1
GASTROINTESTINAL NEGATIVE: 1

## 2020-07-15 NOTE — PROGRESS NOTES
Received a returned call from Dr. Angelika Stuart. Updated that Amio bolus and gtt was ordered and pharmacy called. Instructed just to watch pt overnight. No new orders taken at this time.

## 2020-07-15 NOTE — PROGRESS NOTES
Received a return call from Dr. Hay Headings. Updated on pt's status as well as that Amiodarone bolus and gtt ordered per protocol. Please refer to orders.

## 2020-07-15 NOTE — PROGRESS NOTES
Chest tubes meet criteria to remove per open heart protocol. Verbal order given from Dr. Nanci Silvestre to remove the Patient's chest tubes. No  air leak. No crepitus. Pt instructed on procedure. Pt premedicated with 2mg morphine. Site cleansed and prepped per protocol. Chest tubes X 2 removed without difficulty. Dry sterile dressing applied. Bilateral breath sounds audible. O2 Sats 98 on 3L nasal cannula. Incision site within normal limits. Patient tolerated well.

## 2020-07-15 NOTE — PROGRESS NOTES
4 Eyes Skin Assessment     The patient is being assess for   Shift Handoff    I agree that 2 RN's have performed a thorough Head to Toe Skin Assessment on the patient. ALL assessment sites listed below have been assessed. Areas assessed by both nurses:   [x]   Head, Face, and Ears   [x]   Shoulders, Back, and Chest, Abdomen  [x]   Arms, Elbows, and Hands   [x]   Coccyx, Sacrum, and Ischium  [x]   Legs, Feet, and Heels        Co-signer eSignature: Electronically signed by Daya Alvarez RN on 7/15/20 at 8:54 AM EDT    Does the Patient have Skin Breakdown?   No          Brady Prevention initiated:  NA   Wound Care Orders initiated:  NA      WOC nurse consulted for Pressure Injury (Stage 3,4, Unstageable, DTI, NWPT, Complex wounds)and New or Established Ostomies:  NA      Primary Nurse eSignature: Electronically signed by David Thrasher RN on 7/15/20 at 7:29 AM EDT

## 2020-07-15 NOTE — PROGRESS NOTES
Physical Therapy  Facility/Department: [facility] B2 - ICU  Daily Treatment Note  NAME: Marlon Carreon  : 1933  MRN: [id number]    Date of Service: 7/15/2020    Discharge Recommendations:  Subacute/Skilled Nursing Facility   PT Equipment Recommendations  Equipment Needed: No(TBD at SNF)    Assessment   Body structures, Functions, Activity limitations: Decreased functional mobility ; Decreased strength;Decreased endurance;Decreased balance; Increased pain  Assessment: Pt participated well with PT this session, performing transfers with less assistance and ambulating increased distance with 4WW. Pt still fatigues more quickly than usual with standing activity and requires Ax2 for portions of bed mobility. Recommend pt D/C to SNF in light of current deficits and decreased activity tolerance. Treatment Diagnosis: Decreased endurance and (I) with mobility  Specific instructions for Next Treatment: Progress ther ex and mobility as tolerated  Prognosis: Good  Decision Making: Medium Complexity  PT Education: Goals;PT Role;Plan of Care;Precautions;Transfer Training;Energy Conservation; Functional Mobility Training;Disease Specific Education;Gait Training;General Safety;Equipment;Weight-bearing Education  Patient Education: Pt verbalizes understanding. Barriers to Learning: None noted today  REQUIRES PT FOLLOW UP: Yes  Activity Tolerance: Patient Tolerated treatment well;Patient limited by endurance  Activity Tolerance: VSS during activity while on 2L O2. O2 sat = 93% immediately post-amb on 2L O2. Patient Diagnosis(es): The primary encounter diagnosis was Chest pain, unspecified type. A diagnosis of Unstable angina (HCC) was also pertinent to this visit. has a past medical history of Arthritis, Bell's palsy, CAD (coronary artery disease), Gout, Hypertension, Kidney stone, Thyroid disease, and UTI (urinary tract infection). has a past surgical history that includes joint replacement (Left);  Coronary angioplasty with stent (2019); Coronary angioplasty with stent (2016); and Coronary artery bypass graft (N/A, 2020). Restrictions  Restrictions/Precautions  Restrictions/Precautions: Cardiac, General Precautions, Fall Risk  Position Activity Restriction  Sternal Precautions: No Pushing, No Pulling, 5# Lifting Restrictions  Other position/activity restrictions: Medium fall risk per nursing assessment, ambulate pt, up in chair for meals; IJ, telemetry with ICU monitoring, 2 chest tubes, 2L O2  Subjective   General  Chart Reviewed: Yes  Response To Previous Treatment: Patient with no complaints from previous session. Family / Caregiver Present: No  Referring Practitioner: Luis Santana MD  Subjective  Subjective: Pt agreeable to work with PT/OT this morning, pleasant and cooperative. States he feels ready to get back to bed and rest.  General Comment  Comments: Pt sitting up at EOB upon entry of therapy staff  Pain Screening  Patient Currently in Pain: Denies(pt denied pain at rest and during amb)    Orientation  Orientation  Overall Orientation Status: Within Functional Limits     Objective   Bed mobility  Supine to Sit: Unable to assess(pt seated EOB upon entry of PT)  Sit to Supine: 2 Person assistance(modA x 2)  Scootin Person assistance(modA x 2 to scoot up in bed, pt assisting by pushing upward with BLE)     Transfers  Sit to Stand: Minimal Assistance(min cues needed for safe hand placement and maintenance of sternal precautions)  Stand to sit: Minimal Assistance  Bed to Chair: Minimal assistance(using 4WW, moving from bed<>toilet)     Ambulation  Surface: level tile  Device: Rollator  Other Apparatus: O2(2L O2)  Assistance: Minimal assistance  Quality of Gait: Pt amb with slow daiana with increased B stance time. Mildly unsteady with increased degree of med<>lat postural sway and listing toward R side. Symone x 1 and use of 4WW needed for added stability.   Gait Deviations: Slow Daiana;Decreased step length;Decreased step height  Distance: x 100 feet  Comments: Amb distance limited by c/o fatigue. Balance  Posture: Fair  Sitting - Static: Good  Sitting - Dynamic: Fair;+  Standing - Static: Fair;+  Standing - Dynamic: Fair(using [id number])     Exercises  Comments: Deferred LE ther ex today due to fatigue after performing functional mobility and gait, and using bathroom. Other Activities: Other (see comment)  Comment: Pt assisted into/out of bathroom using 4WW with Jane x 1. See OT note for details. AM-PAC Score  AM-PAC Inpatient Mobility Raw Score : 15 (07/15/20 1353)  AM-PAC Inpatient T-Scale Score : 39.45 (07/15/20 1353)  Mobility Inpatient CMS 0-100% Score: 57.7 (07/15/20 1353)  Mobility Inpatient CMS G-Code Modifier : CK (07/15/20 1353)    Goals  Short term goals  Time Frame for Short term goals: 1 week, 7/21/20 (unless otherwise specified)  Short term goal 1: Pt will transfer supine <-> sit with modA x 1  Short term goal 2: Pt will transfer sit <-> stand and bed>chair using 4WW with CGA x 1  Short term goal 3: Pt will ambulate x 150 feet using 4WW with CGA/SBA x 1  Short term goal 4: By 7/17/20: Pt will tolerate 12-15 reps BLE exercise for strengthening, balance, and endurance  Patient Goals   Patient goals : \"To get stronger and go to rehab so I can eventually go back home. \"    Plan    Times per week: 5-7x/week in acute care  Times per day: Daily  Specific instructions for Next Treatment: Progress ther ex and mobility as tolerated  Current Treatment Recommendations: Strengthening, Balance Training, Functional Mobility Training, Transfer Training, Gait Training, Safety Education & Training, Patient/Caregiver Education & Training, Equipment Evaluation, Education, & procurement, Home Exercise Program, Endurance Training, Stair training  Safety Devices:  All fall risk precautions in place, Call light within reach, Nurse notified, Gait belt, Patient at risk for falls, Left in bed(pt assisted back to bed per RN request, due to impending chest tube removal)     Therapy Time   Individual Concurrent Group Co-treatment   Time In 1150         Time Out 1214         Minutes 24         Timed Code Treatment Minutes: [address] [facility], [address], [place] #[id number]    If pt is unable to be seen after this session, please let this note serve as discharge summary. Please see case management note for discharge disposition. Thank you.

## 2020-07-15 NOTE — PROGRESS NOTES
Criteria met per clinical pathway to remove epicardial pacing wires & MD, Dr. Kelin Davis, gave a verbal order. Procedure explained to patient. INR is less than 2.0. Pacing wire site within normal limits. Cleansed site with Chloroprep. Atrial and Ventricular pacing wires removed per policy without difficulty. Dry sterile dressing applied. Vital signs taken every 15 minutes X 2, every 30 minutes X 1, and every hour X 1 recorded in Doc Flowsheets. Patient tolerated procedure well, heart tones easily auscultated, oxygen saturation within normal limits, 95% on 3L O2. No signs/symtoms of cardiac tamponade.

## 2020-07-15 NOTE — PROGRESS NOTES
Pt sitting in bed working on breathing exercises and his heart rate increased from 105 to the 130's. Pt was asymptomatic. RN called [facility] to double check pt's rhythm. RN ordered STAT EKG. EKG results are Afib RVR with Acute MI/STEMI. Placed call to CT Surgery on call. Pt began to have nausea but continues to deny having any chest pain or shortness of breath. Waiting for a returned call from CT Surgery.

## 2020-07-15 NOTE — PLAN OF CARE
Problem: Falls - Risk of:  Goal: Will remain free from falls  Description: Will remain free from falls  Outcome: Ongoing  Goal: Absence of physical injury  Description: Absence of physical injury  Outcome: Ongoing     Problem: Pain:  Goal: Pain level will decrease  Description: Pain level will decrease  Outcome: Ongoing  Goal: Control of acute pain  Description: Control of acute pain  Outcome: Ongoing  Goal: Control of chronic pain  Description: Control of chronic pain  Outcome: Ongoing     Problem: Preoperative Routine:  Goal: Will comply with preparation for surgery or procedure  Description: Will comply with preparation for surgery or procedure  Outcome: Ongoing

## 2020-07-15 NOTE — PROGRESS NOTES
Nutrition Assessment     Type and Reason for Visit: Initial, Consult, Patient Education    Nutrition Recommendations/Plan:   1. Continue Cardiac diet with FR  2. Add low volume ONS to promote nutrition adequacy   3. Education initiated, would benefit from reinforcement p/t d/c especially if wife present   4. Monitor nutrition adequacy, pertinent labs, bowel habits, wt changes, and clinical progress     Nutrition Assessment:  Consult for diet education. LOS assessment. NPO x past three days. Diet advanced this am to cardiac with FR. Ate fairly minimal this am. Consuming lunch now. Encouraged adequate nutrition intake. Diet education initiated but will benefit from more reinforcement p/t discharge. Wife not present at this time. Nutrition Related Findings: Chest tubes in place. Current Nutrition Therapies:    DIET CARDIAC; Daily Fluid Restriction: 1500 ml    Anthropometric Measures:  · Height: 5' 9\" (175.3 cm)  · Current Body Wt: 208 lb (94.3 kg)   · BMI: 30.7    Nutrition Diagnosis:   · Food & Nutrition-related knowledge deficit related to cardiac dysfunction as evidenced by (CAD and Post op CABG)      Nutrition Interventions:   Food and/or Nutrient Delivery:  Continue Current Diet, Start Oral Nutrition Supplement  Nutrition Education/Counseling:  Education initiated   Coordination of Nutrition Care:  Continued Inpatient Monitoring    Goals: Tolerate diet and consume greater than 50% of meals and ONS this admission       Nutrition Monitoring and Evaluation:   Behavioral-Environmental Outcomes:  Beliefs and Attitutes, Knowledge or Skill   Food/Nutrient Intake Outcomes:  Food and Nutrient Intake, Supplement Intake  Physical Signs/Symptoms Outcomes:  Meal Time Behavior     Discharge Planning:    Continue current diet     Electronically signed by Ton Her.  Carmen Roberts RD, LD on 7/15/20 at 12:49 PM EDT    Contact: [phone]

## 2020-07-15 NOTE — PROGRESS NOTES
[address]  Cardiology  Progress Note    Admission date:  2020    Reason for follow up visit: CAD    HPI/CC: Ivet Gutierrez is a 80 y.o. male was admitted 2020 for chest pain. Troponin elevated. [facility] 2020 showed severe LM disease, referred to CT surgery. On 2020 he had CABG x2 and EDWINA clip. Rhythm overnight has been sinus-sinus tachycardia. Subjective: Feels better than yesterday, no complaints. Vitals:  Blood pressure (!) 107/58, pulse 93, temperature 98.1 °F (36.7 °C), temperature source Oral, resp. rate 12, height 5' 9\" (1.753 m), weight 208 lb 5.4 oz (94.5 kg), SpO2 95 %.   Temp  Av.1 °F (36.7 °C)  Min: 97.5 °F (36.4 °C)  Max: 98.7 °F (37.1 °C)  Pulse  Av.7  Min: 82  Max: 116  BP  Min: 93/54  Max: 145/67  SpO2  Av.1 %  Min: 91 %  Max: 100 %    24 hour I/O    Intake/Output Summary (Last 24 hours) at 7/15/2020 1130  Last data filed at 7/15/2020 9025  Gross per 24 hour   Intake 1415 ml   Output 2365 ml   Net -950 ml     Current Facility-Administered Medications   Medication Dose Route Frequency Provider Last Rate Last Dose    famotidine (PEPCID) tablet 20 mg  20 mg Oral BID Hans Ivy MD   20 mg at 07/15/20 0831    metoprolol tartrate (LOPRESSOR) tablet 50 mg  50 mg Oral BID Natasha Eric MD   50 mg at 07/15/20 0915    sodium chloride flush 0.9 % injection 10 mL  10 mL Intravenous 2 times per day Hans Ivy MD   10 mL at 07/15/20 0834    sodium chloride flush 0.9 % injection 10 mL  10 mL Intravenous PRN Hans Ivy MD        potassium chloride 20 mEq/50 mL IVPB (Central Line)  20 mEq Intravenous PRN Hans Ivy MD   Stopped at 20 0345    magnesium sulfate 2 g in 50 mL IVPB premix  2 g Intravenous PRN Hans Ivy MD   Stopped at 20 2135    acetaminophen (TYLENOL) tablet 650 mg  650 mg Oral Q6H Hans Ivy MD   650 mg at 07/15/20 0920    oxyCODONE (ROXICODONE) immediate release tablet 5 mg  5 mg Oral Q4H PRN Elkin Nuhar Monika Roy MD   5 mg at 07/14/20 1012    Or    oxyCODONE (ROXICODONE) immediate release tablet 10 mg  10 mg Oral Q4H PRN Luis Mayorga MD   10 mg at 07/15/20 0829    morphine (PF) injection 2 mg  2 mg Intravenous Q2H PRN Luis Mayorga MD   2 mg at 07/13/20 1718    Or    morphine (PF) injection 4 mg  4 mg Intravenous Q2H PRN Luis Mayorga MD   4 mg at 07/13/20 2133    diphenhydrAMINE (BENADRYL) tablet 25 mg  25 mg Oral Nightly PRN Luis Mayorga MD        polyethylene glycol [facility]) packet 17 g  17 g Oral Daily Luis Mayorga MD   17 g at 07/15/20 0829    bisacodyl (DULCOLAX) EC tablet 5 mg  5 mg Oral Daily Luis Mayorga MD   5 mg at 07/15/20 0831    bisacodyl (DULCOLAX) suppository 10 mg  10 mg Rectal Daily PRN Luis Mayorga MD        ondansetron [facility]) injection 4 mg  4 mg Intravenous Q8H PRN Luis Mayorga MD        chlorhexidine (PERIDEX) 0.12 % solution 15 mL  15 mL Mouth/Throat BID Luis Mayorga MD   15 mL at 07/15/20 6146    furosemide (LASIX) injection 40 mg  40 mg Intravenous BID Luis Mayorga MD   40 mg at 07/15/20 0829    magnesium oxide (MAG-OX) tablet 400 mg  400 mg Oral Daily Luis Mayorga MD   400 mg at 07/15/20 0830    mupirocin (BACTROBAN) 2 % ointment   Nasal BID Luis Mayorga MD        nitroGLYCERIN (NITRODUR) 0.2 MG/HR 1 patch  1 patch Transdermal Daily Luis Mayorga MD   Stopped at 07/15/20 2158    potassium chloride (KLOR-CON M) extended release tablet 10 mEq  10 mEq Oral TID WC Luis Mayorga MD   10 mEq at 07/15/20 0831    fondaparinux (ARIXTRA) injection 2.5 mg  2.5 mg Subcutaneous Daily Luis Mayorga MD   Stopped at 07/14/20 0930    aspirin EC tablet 81 mg  81 mg Oral Daily Luis Mayorga MD   81 mg at 07/14/20 0832    albuterol sulfate  (90 Base) MCG/ACT inhaler 2 puff  2 puff Inhalation Q6H PRN Luis Mayorga MD        insulin glargine (LANTUS) injection vial 13 Units  0.15 Units/kg Subcutaneous Nightly Radah Moser MD   13 Units at 07/14/20 2347    insulin lispro (HUMALOG) injection vial 0-6 Units  0-6 Units Subcutaneous TID WC Radha Moser MD        insulin lispro (HUMALOG) injection vial 0-3 Units  0-3 Units Subcutaneous Nightly Radha Moser MD        glucose (GLUTOSE) 40 % oral gel 15 g  15 g Oral PRN Radha Moser MD        dextrose 50 % IV solution  12.5 g Intravenous PRN Radha Moser MD        glucagon (rDNA) injection 1 mg  1 mg Intramuscular PRN Radha Moser MD        dextrose 5 % solution  100 mL/hr Intravenous PRN Radha Moser MD        clopidogrel (PLAVIX) tablet 75 mg  75 mg Oral Daily Radha Moser MD        0.9 % sodium chloride bolus  20 mL Intravenous Once Radha Moser MD        melatonin tablet 5 mg  5 mg Oral Nightly PRN Aram Starr, APRN - CNP        atorvastatin (LIPITOR) tablet 80 mg  80 mg Oral Nightly Aramperry Starr APRN - CNP   80 mg at 07/14/20 2241    ketorolac (TORADOL) injection 15 mg  15 mg Intravenous Q6H PRN Radha Moser MD   15 mg at 07/14/20 1012    allopurinol (ZYLOPRIM) tablet 300 mg  300 mg Oral Daily Radha Moser MD   300 mg at 07/15/20 0831    levothyroxine (SYNTHROID) tablet 50 mcg  50 mcg Oral Daily Radha Moser MD   50 mcg at 07/15/20 0630     Review of Systems   Constitutional: Negative. Respiratory: Negative. Cardiovascular: Negative. Gastrointestinal: Negative. Neurological: Negative.       Objective:     Telemetry monitor: SR    Physical Exam:  Constitutional:  Comfortable and alert, NAD, appears stated age  Eyes: PERRL, sclera nonicteric  Neck:  Supple, no masses, no thyroidmegaly, JVD difficult to assess  Skin:  Warm and dry; no rash or lesions; +sternal incision  Heart:  Regular, normal apex, S1 and S2 normal, no M/G/R  Lungs:  Normal respiratory effort; clear; no wheezing/rhonchi/rales  Abdomen: soft, non tender, + bowel sounds  Extremities:  No edema or cyanosis; no clubbing  Neuro: alert and oriented, moves legs and arms equally, normal mood and affect  Right radial site soft, no hematoma, 2+ pulse, nontender, no ooze, moderate ecchymosis    Data Reviewed:    CABG 7/13/2020:  1. Urgent coronary artery bypass grafting surgery x2 with a single  greater saphenous vein graft to the obtuse marginal branch of the  circumflex, pedicled left internal artery to the LAD. 2.  On-pump beating heart/no cross clamp technique. 3.  Left atrial appendage obliteration with 45-mm AtriCure left atrial  clip. 4.  [place]bi catheter placement. 5.  Cardiopulmonary bypass. 6.  Endoscopic vein harvest to the right greater saphenous vein. 7.  Transesophageal echo. 8.  Epiaortic ultrasound. 9.  Doppler verification of grafts. 10.  Bilateral five-level intercostal nerve block with Exparel. 11.  Platelet-gel application. Echo 7/11/2020:   Left ventricular systolic function is normal with a visually estimated   ejection fraction of 55%. The left ventricle is normal in size with mild concentric hypertrophy. No regional wall motion abnormalities are noted. Diastolic filling parameters suggests impaired left ventricular relaxation. Coronary angiogram 7/9/2020:  Unstable angina  PROCEDURES PERFORMED    Left heart catheterization  LVgram  Coronary angiogam  Coronary cath  PROCEDURE DESCRIPTION   Risks/benefits/alternatives/outcomes were discussed with patient and/or family and informed consent was obtained. Using the [facility] scale, the patient's right radial artery was found to be a level B. Patient was prepped draped in the usual sterile fashion. Local anaesthetic was applied over puncture site. Using a back wall technique, a 6 Upper sorbian Terumo sheath was inserted into right radial artery. Verapamil, nitroglycerin, nicardipine were administered through the sheath. Heparin was administered. Diagnostic 5 Persian Red Tricycletronic pigtail and ultra catheters were used for diagnostic angiograms. At the conclusion of the procedure, a TR band was placed over the puncture site and hemostasis was obtained. There were no immediate complications. I supervised sedation with versed 2 mg/fentanyl 50 Mcg during the procedure. 100 cc contrast was utilized. <20cc EBL. FINDINGS     LVEDP  15   GRADIENT ACROSS AORTIC VALVE  <10mm HG   LV FUNCTION EF 60 %   WALL MOTION  normal   MITRAL REGURGITATION  mild      LM  Proximal-mid 10 to 20% stenosis with distal 90% stenosis that extends into both the LAD and circumflex, calcified vessel.         LAD  There is a stent in the ostium of the LAD that extends to the proximal and mid segments, there is 99% ostial/proximal stenosis with mid 60% stenosis and distal 60% stenosis.     D1, D2, D3 have diffuse disease with D1 having proximal 60% stenosis and D2 and D3 have ostial 90% stenoses, these are small diagonal branches.         LCX Ostial/proximal 90% stenosis, calcified vessel, proximal 30 to 40% stenosis followed by mid-distal 60 to 70% stenosis that extends into the first obtuse marginal.  There is a bifurcation point distally in this vessel and the lower branch has ostial 70 to 80% stenosis.       RCA  Dominant, calcified, ostial/proximal 40 to 50% stenosis. Mid-distal 30% stenosis. Vessel is calcified, PDA and PLV have [id number] 20 to 30% stenoses. CONCLUSIONS:    Severe left main/LAD/circumflex disease  Refer for CABG  Continue heparin drip, hold Plavix in anticipation of surgery, will consider bridging with Integrilin/cangrelor pending removal of TR band. Echo 7/17/2019:  Normal left ventricular systolic function with ejection fraction of 55-60%. No regional wall motion abnormalites are seen. Grade I diastolic dysfunction with normal filling pressure. Mild aortic   stenosis. Mild aortic regurgitation. Systolic pulmonic artery pressure (SPAP) is normal estimated at 23 mmHg   (Right atrial pressure of 3 mmHg).     Lab Reviewed:     Renal

## 2020-07-15 NOTE — PROGRESS NOTES
Ok to remove pacing wires per Dr. [place] Manual as well as both chest tubes 2 hours after pacing wires removed if criteria is met.

## 2020-07-16 LAB
ANION GAP SERPL CALCULATED.3IONS-SCNC: 9 MMOL/L (ref 3–16)
BUN BLDV-MCNC: 29 MG/DL (ref 7–20)
CALCIUM SERPL-MCNC: 8.6 MG/DL (ref 8.3–10.6)
CHLORIDE BLD-SCNC: 101 MMOL/L (ref 99–110)
CO2: 26 MMOL/L (ref 21–32)
CREAT SERPL-MCNC: 1 MG/DL (ref 0.8–1.3)
EKG ATRIAL RATE: 74 BPM
EKG ATRIAL RATE: 93 BPM
EKG DIAGNOSIS: NORMAL
EKG DIAGNOSIS: NORMAL
EKG Q-T INTERVAL: 348 MS
EKG Q-T INTERVAL: 420 MS
EKG QRS DURATION: 72 MS
EKG QRS DURATION: 72 MS
EKG QTC CALCULATION (BAZETT): 477 MS
EKG QTC CALCULATION (BAZETT): 525 MS
EKG R AXIS: -11 DEGREES
EKG R AXIS: -2 DEGREES
EKG T AXIS: 23 DEGREES
EKG T AXIS: 24 DEGREES
EKG VENTRICULAR RATE: 113 BPM
EKG VENTRICULAR RATE: 94 BPM
GFR AFRICAN AMERICAN: >60
GFR NON-AFRICAN AMERICAN: >60
GLUCOSE BLD-MCNC: 149 MG/DL (ref 70–99)
GLUCOSE BLD-MCNC: 193 MG/DL (ref 70–99)
GLUCOSE BLD-MCNC: 212 MG/DL (ref 70–99)
GLUCOSE BLD-MCNC: 243 MG/DL (ref 70–99)
HCT VFR BLD CALC: 26.2 % (ref 40.5–52.5)
HEMOGLOBIN: 8.8 G/DL (ref 13.5–17.5)
MAGNESIUM: 2 MG/DL (ref 1.8–2.4)
MCH RBC QN AUTO: 31.3 PG (ref 26–34)
MCHC RBC AUTO-ENTMCNC: 33.7 G/DL (ref 31–36)
MCV RBC AUTO: 93 FL (ref 80–100)
PDW BLD-RTO: 14.4 % (ref 12.4–15.4)
PERFORMED ON: ABNORMAL
PLATELET # BLD: 132 K/UL (ref 135–450)
PMV BLD AUTO: 9.2 FL (ref 5–10.5)
POTASSIUM SERPL-SCNC: 4 MMOL/L (ref 3.5–5.1)
RBC # BLD: 2.82 M/UL (ref 4.2–5.9)
SODIUM BLD-SCNC: 136 MMOL/L (ref 136–145)
WBC # BLD: 12.9 K/UL (ref 4–11)

## 2020-07-16 PROCEDURE — 2700000000 HC OXYGEN THERAPY PER DAY

## 2020-07-16 PROCEDURE — 83735 ASSAY OF MAGNESIUM: CPT

## 2020-07-16 PROCEDURE — 6360000002 HC RX W HCPCS: Performed by: THORACIC SURGERY (CARDIOTHORACIC VASCULAR SURGERY)

## 2020-07-16 PROCEDURE — 6370000000 HC RX 637 (ALT 250 FOR IP): Performed by: THORACIC SURGERY (CARDIOTHORACIC VASCULAR SURGERY)

## 2020-07-16 PROCEDURE — 93005 ELECTROCARDIOGRAM TRACING: CPT | Performed by: THORACIC SURGERY (CARDIOTHORACIC VASCULAR SURGERY)

## 2020-07-16 PROCEDURE — 2000000000 HC ICU R&B

## 2020-07-16 PROCEDURE — 97530 THERAPEUTIC ACTIVITIES: CPT

## 2020-07-16 PROCEDURE — 93010 ELECTROCARDIOGRAM REPORT: CPT | Performed by: INTERNAL MEDICINE

## 2020-07-16 PROCEDURE — 97116 GAIT TRAINING THERAPY: CPT

## 2020-07-16 PROCEDURE — 85027 COMPLETE CBC AUTOMATED: CPT

## 2020-07-16 PROCEDURE — 2580000003 HC RX 258: Performed by: THORACIC SURGERY (CARDIOTHORACIC VASCULAR SURGERY)

## 2020-07-16 PROCEDURE — 80048 BASIC METABOLIC PNL TOTAL CA: CPT

## 2020-07-16 PROCEDURE — 6370000000 HC RX 637 (ALT 250 FOR IP): Performed by: NURSE PRACTITIONER

## 2020-07-16 PROCEDURE — 99232 SBSQ HOSP IP/OBS MODERATE 35: CPT | Performed by: NURSE PRACTITIONER

## 2020-07-16 PROCEDURE — 94761 N-INVAS EAR/PLS OXIMETRY MLT: CPT

## 2020-07-16 PROCEDURE — 97535 SELF CARE MNGMENT TRAINING: CPT

## 2020-07-16 PROCEDURE — 2500000003 HC RX 250 WO HCPCS: Performed by: THORACIC SURGERY (CARDIOTHORACIC VASCULAR SURGERY)

## 2020-07-16 RX ORDER — BUMETANIDE 0.25 MG/ML
1 INJECTION, SOLUTION INTRAMUSCULAR; INTRAVENOUS ONCE
Status: COMPLETED | OUTPATIENT
Start: 2020-07-16 | End: 2020-07-16

## 2020-07-16 RX ADMIN — POTASSIUM CHLORIDE 10 MEQ: 10 TABLET, EXTENDED RELEASE ORAL at 09:36

## 2020-07-16 RX ADMIN — METOPROLOL TARTRATE 50 MG: 50 TABLET, FILM COATED ORAL at 09:36

## 2020-07-16 RX ADMIN — POLYETHYLENE GLYCOL 3350 17 G: 17 POWDER, FOR SOLUTION ORAL at 09:35

## 2020-07-16 RX ADMIN — APIXABAN 5 MG: 5 TABLET, FILM COATED ORAL at 21:23

## 2020-07-16 RX ADMIN — INSULIN LISPRO 1 UNITS: 100 INJECTION, SOLUTION INTRAVENOUS; SUBCUTANEOUS at 21:25

## 2020-07-16 RX ADMIN — Medication 15 ML: at 21:23

## 2020-07-16 RX ADMIN — ASPIRIN 81 MG: 81 TABLET ORAL at 09:36

## 2020-07-16 RX ADMIN — DEXTROSE MONOHYDRATE 0.5 MG/MIN: 50 INJECTION, SOLUTION INTRAVENOUS at 18:34

## 2020-07-16 RX ADMIN — FONDAPARINUX SODIUM 2.5 MG: 2.5 INJECTION, SOLUTION SUBCUTANEOUS at 09:37

## 2020-07-16 RX ADMIN — MAGNESIUM GLUCONATE 500 MG ORAL TABLET 400 MG: 500 TABLET ORAL at 09:36

## 2020-07-16 RX ADMIN — FUROSEMIDE 40 MG: 10 INJECTION, SOLUTION INTRAMUSCULAR; INTRAVENOUS at 16:22

## 2020-07-16 RX ADMIN — FAMOTIDINE 20 MG: 20 TABLET, FILM COATED ORAL at 21:22

## 2020-07-16 RX ADMIN — OXYCODONE 5 MG: 5 TABLET ORAL at 21:23

## 2020-07-16 RX ADMIN — MUPIROCIN: 20 OINTMENT TOPICAL at 21:23

## 2020-07-16 RX ADMIN — BUMETANIDE 1 MG: 0.25 INJECTION, SOLUTION INTRAMUSCULAR; INTRAVENOUS at 16:23

## 2020-07-16 RX ADMIN — INSULIN GLARGINE 13 UNITS: 100 INJECTION, SOLUTION SUBCUTANEOUS at 21:25

## 2020-07-16 RX ADMIN — Medication 15 ML: at 09:37

## 2020-07-16 RX ADMIN — ALLOPURINOL 300 MG: 300 TABLET ORAL at 09:37

## 2020-07-16 RX ADMIN — Medication 10 ML: at 09:37

## 2020-07-16 RX ADMIN — POTASSIUM CHLORIDE 10 MEQ: 10 TABLET, EXTENDED RELEASE ORAL at 16:23

## 2020-07-16 RX ADMIN — APIXABAN 5 MG: 5 TABLET, FILM COATED ORAL at 16:22

## 2020-07-16 RX ADMIN — CLOPIDOGREL BISULFATE 75 MG: 75 TABLET ORAL at 09:37

## 2020-07-16 RX ADMIN — ATORVASTATIN CALCIUM 80 MG: 80 TABLET, FILM COATED ORAL at 21:23

## 2020-07-16 RX ADMIN — POTASSIUM CHLORIDE 10 MEQ: 10 TABLET, EXTENDED RELEASE ORAL at 13:32

## 2020-07-16 RX ADMIN — FUROSEMIDE 40 MG: 10 INJECTION, SOLUTION INTRAMUSCULAR; INTRAVENOUS at 09:37

## 2020-07-16 RX ADMIN — INSULIN LISPRO 2 UNITS: 100 INJECTION, SOLUTION INTRAVENOUS; SUBCUTANEOUS at 13:36

## 2020-07-16 RX ADMIN — OXYCODONE 5 MG: 5 TABLET ORAL at 13:43

## 2020-07-16 RX ADMIN — BISACODYL 5 MG: 5 TABLET, COATED ORAL at 09:36

## 2020-07-16 RX ADMIN — Medication 10 ML: at 21:23

## 2020-07-16 RX ADMIN — INSULIN LISPRO 1 UNITS: 100 INJECTION, SOLUTION INTRAVENOUS; SUBCUTANEOUS at 09:38

## 2020-07-16 RX ADMIN — AMIODARONE HYDROCHLORIDE 400 MG: 200 TABLET ORAL at 21:23

## 2020-07-16 RX ADMIN — MUPIROCIN: 20 OINTMENT TOPICAL at 09:37

## 2020-07-16 RX ADMIN — LEVOTHYROXINE SODIUM 50 MCG: 0.05 TABLET ORAL at 09:36

## 2020-07-16 RX ADMIN — INSULIN LISPRO 2 UNITS: 100 INJECTION, SOLUTION INTRAVENOUS; SUBCUTANEOUS at 18:32

## 2020-07-16 RX ADMIN — AMIODARONE HYDROCHLORIDE 400 MG: 200 TABLET ORAL at 09:36

## 2020-07-16 RX ADMIN — DEXTROSE MONOHYDRATE 0.5 MG/MIN: 50 INJECTION, SOLUTION INTRAVENOUS at 04:30

## 2020-07-16 RX ADMIN — FAMOTIDINE 20 MG: 20 TABLET, FILM COATED ORAL at 09:35

## 2020-07-16 RX ADMIN — METOPROLOL TARTRATE 50 MG: 50 TABLET, FILM COATED ORAL at 21:23

## 2020-07-16 ASSESSMENT — ENCOUNTER SYMPTOMS
GASTROINTESTINAL NEGATIVE: 1
RESPIRATORY NEGATIVE: 1

## 2020-07-16 ASSESSMENT — PAIN SCALES - GENERAL
PAINLEVEL_OUTOF10: 0
PAINLEVEL_OUTOF10: 4
PAINLEVEL_OUTOF10: 0
PAINLEVEL_OUTOF10: 3
PAINLEVEL_OUTOF10: 0

## 2020-07-16 NOTE — PROGRESS NOTES
CVTS Cardiothoracic Progress Note:                                CC:  Post op follow up     Surgery: 7/13 Urgent CABG X 2, with pedicled LIMA to LAD, single Greater Saphenous VG to OM, EDWINA obliteration with 45 mm Atricure LA Clip, SGC, CPB, EVH Right Greater Saphenous vein, ELISEO, Epiaortic ultrasound, Doppler verification of grafts, Bilateral 5 level intercostal nerve block(Exparel), Platelet gel application. Hospital course:  7/14 Sitting up in chair, c/o pain to CT site overnight. 7/15 Sitting up in chair, reports significant improvement in pain overnight. Wanting to eat breakfast.   7/16 awake, alert and up in chair. TPW and CTs out yesterday. Went into a-fib yesterday evening. Subjective:   Dietary Intake: still with decreased appetite. No Nausea    Pain Control: controlled, PRN pain meds   Complaints: none  Bowels: no BM, +flatus.  active bowel sounds       Past Medical History:   Diagnosis Date    Arthritis     Bell's palsy     CAD (coronary artery disease)     Gout     Hypertension     Kidney stone     Thyroid disease     UTI (urinary tract infection)         Past Surgical History:   Procedure Laterality Date    CORONARY ANGIOPLASTY WITH STENT PLACEMENT  07/25/2019    Dr Bonnie Corona, Rotational Atherectomy and BRET x 3 to LM/LAD    CORONARY ANGIOPLASTY WITH STENT PLACEMENT  12/22/2016    Dr Bonnie Corona, Orbital Atherectomy and BRET x 2 to LAD - Synergy 2.75x16 and 3x8, post dilated to 3mm NC.     CORONARY ARTERY BYPASS GRAFT N/A 7/13/2020    CORONARY ARTERY BYPASS GRAFTING X2, INTERNAL MAMMARY ARTERY, SAPHENOUS VEIN GRAFT, ON PUMP BEATING HEART,  45MM EDWINA CLIP, 5 LEVEL BILATERAL INTERCOSTAL NERVE BLOCK performed by Rigoberto Bridges MD at [address] [place] [address] Left         Allergies as of 07/08/2020    (No Known Allergies)        Patient Active Problem List   Diagnosis    Coronary artery disease involving native coronary artery of native heart with unstable angina pectoris ([facility] [address]     Pulmonary vascular congestion. ________________________________________________________________________      Objective:   General appearance: awake, alert, in nad   Lungs: diminished  Heart: irregularly irregular rhythm. A-fib, rate controlled. Chest: symmetrical expansion with inspiration and expirations; no rocking of sternum noted   Abdomen: soft, non-tender   Bowel sounds: normoactive   Kidneys: UOP 2500 for the past 24 hours. Cr 1  Wound/Incisions: Midsternal incision CDI; RLE incisions CDI; Pacing wires out 7/15. Extremities: BLE pulses present; 1+ swelling noted in BLE  Neurological: awake, alert, no focal deficits   Chest tubes/Drains: Chest tube # 1 and chest tube #2 out 7/15.     _Assessment:   Post-op: 3 days. Condition: In stable condition. Plan:   1. Cardiovascular: S/P CABG x 2 with EDWINA clip. ASA, statin, BB. Went into a-fib around 7:20 PM 7/15. On amio gtt. Rate controlled. Will start PO amio today. 2. Pulmonary: expansion measures- IS, OOBTC, PT/OT, ambulation. Wean O2 as tolerated. 3. Neurology: continue analgesia as needed     4. Nephrology: fluid management- diurese as tolerated. Lasix IV BID. Weight trending down but still up from pre-op weight. One time dose of Bumex ordered.    -Per nursing staff pt only voiding between 100 and 200 ml at a time, pt reporting some difficulty urinating. Obtain bladder scan for post void residual and consider starting proscar if no improvement. 5. Endocrinology: on SSI. Continue to trend and adjust accordingly. 6. Hematology: acute blood loss anemia. Hg 8.8 today (9.3 yesterday). Will continue to trend. 7. Microbiology: WBC 12.9 today (up from 11 yesterday). Pt afebrile. Monitor. 8. Nutrition: ADAT. Pt with poor intake. Dietitian consulted. Rec low volume ONS. Changed diet to general to encourage intake. 9. Labs: continue to monitor      10. Post-op Drains/Wires: jennings d/c'd 7/15. TPW and CTs d/c'd 7/15.      11.

## 2020-07-16 NOTE — PROGRESS NOTES
Physical Therapy  Facility/Department: [facility] B2 - ICU  Daily Treatment Note  NAME: Maury Mooney  : 1933  MRN: [id number]    Date of Service: 2020    Discharge Recommendations:  Subacute/Skilled Nursing Facility   PT Equipment Recommendations  Equipment Needed: No(TBD at SNF)    Assessment   Body structures, Functions, Activity limitations: Decreased functional mobility ; Decreased strength;Decreased endurance;Decreased balance; Increased pain  Assessment: Pt participated well with PT this session, performing transfers with less assistance and ambulating increased distance with 4WW. Pt still fatigues more quickly than usual with standing activity and requires Ax2 for portions of transfers (particularly from lower-height surfaces). Pt also still with occasional lapses in short-term memory. Recommend pt D/C to SNF in light of current deficits and decreased activity tolerance. Treatment Diagnosis: Decreased endurance and (I) with mobility  Specific instructions for Next Treatment: Progress ther ex and mobility as tolerated  Prognosis: Good  Decision Making: Medium Complexity  PT Education: Goals;PT Role;Plan of Care;Precautions;Transfer Training;Energy Conservation; Functional Mobility Training;Disease Specific Education;Gait Training;General Safety;Equipment;Weight-bearing Education; Family Education  Patient Education: Pt verbalizes understanding. Barriers to Learning: Impaired memory at times  REQUIRES PT FOLLOW UP: Yes  Activity Tolerance: Patient Tolerated treatment well;Patient limited by endurance  Activity Tolerance: VSS during activity while on 3L O2. O2 sat = 94% immediately post-amb on 3L O2. Patient Diagnosis(es): The primary encounter diagnosis was Chest pain, unspecified type. A diagnosis of Unstable angina (HCC) was also pertinent to this visit.      has a past medical history of Arthritis, Bell's palsy, CAD (coronary artery disease), Gout, Hypertension, Kidney stone, Thyroid disease, and UTI height  Distance: x 12 feet, x 120 feet  Comments: Amb distance limited by c/o fatigue. Balance  Posture: Fair  Sitting - Static: Good  Sitting - Dynamic: Fair;+  Standing - Static: Fair;+(using [id number])  Standing - Dynamic: Fair(using [id number])     Exercises  Comments: Deferred LE ther ex today due to fatigue after performing functional mobility and gait. Other Activities: Other (see comment)  Comment: Pt assisted into/out of bathroom using 4WW with Jane x 1. See OT note for details. AM-PAC Score  AM-PAC Inpatient Mobility Raw Score : 16 (07/16/20 1701)  AM-PAC Inpatient T-Scale Score : 40.78 (07/16/20 1701)  Mobility Inpatient CMS 0-100% Score: 54.16 (07/16/20 1701)  Mobility Inpatient CMS G-Code Modifier : CK (07/16/20 1701)    Goals  Short term goals  Time Frame for Short term goals: 1 week, 7/21/20 (unless otherwise specified)  Short term goal 1: Pt will transfer supine <-> sit with modA x 1  Short term goal 2: Pt will transfer sit <-> stand and bed>chair using 4WW with CGA x 1  Short term goal 3: Pt will ambulate x 150 feet using 4WW with CGA/SBA x 1  Short term goal 4: By 7/17/20: Pt will tolerate 12-15 reps BLE exercise for strengthening, balance, and endurance  Patient Goals   Patient goals : \"To get stronger and go to rehab so I can eventually go back home. \"    Plan    Times per week: 5-7x/week in acute care  Times per day: Daily  Specific instructions for Next Treatment: Progress ther ex and mobility as tolerated  Current Treatment Recommendations: Strengthening, Balance Training, Functional Mobility Training, Transfer Training, Gait Training, Safety Education & Training, Patient/Caregiver Education & Training, Equipment Evaluation, Education, & procurement, Home Exercise Program, Endurance Training, Stair training  Safety Devices:  All fall risk precautions in place, Call light within reach, Nurse notified, Gait belt, Patient at risk for falls, Left in chair;Chair alarm in place    Therapy Time

## 2020-07-16 NOTE — PROGRESS NOTES
Pt son at bedside at this time POC reviewed and updated,. Pt son questioning status of DCP to [facility]. Writer discussed with DCP Jessica at this time. Clarified sons preference to D/C to [facility] at D/C. Per Jessica per [facility], pt accepted 2 days ago. Pt can be accepted when medically ready w/o pre-cert. Pt son updated.

## 2020-07-16 NOTE — PROGRESS NOTES
Assessment complete. OOB to chair. A&Ox4. VSS. Tele SR. Denies current c/o pain. O2 95% on 5lpm NC. Weaned to 4 lpm NC. Sx incisions CDI. Voiding per urinal. C/O some dysuria. Call light in reach. Will monitor.

## 2020-07-16 NOTE — PROGRESS NOTES
Occupational Therapy  Facility/Department: [facility] B2 - ICU  Daily Treatment Note  NAME: Abel Conley  : 1933  MRN: [id number]    Date of Service: 2020    Discharge Recommendations:  Subacute/Skilled Nursing Facility       Assessment   Performance deficits / Impairments: Decreased functional mobility ; Decreased ADL status; Decreased balance;Decreased endurance;Decreased strength;Decreased safe awareness;Decreased high-level IADLs  Assessment: Patient Jane for mobility this date with 4ww, Jane for toilet transfers pt noted with increased activity tolerance progressing towards OT goals. Will continue to follow and recommend SNF at discharge. REQUIRES OT FOLLOW UP: Yes  Activity Tolerance  Activity Tolerance: Patient Tolerated treatment well  Safety Devices  Type of devices: Gait belt;Bed alarm in place;Call light within reach; Left in bed;Nurse notified         Patient Diagnosis(es): The primary encounter diagnosis was Chest pain, unspecified type. A diagnosis of Unstable angina (HCC) was also pertinent to this visit. has a past medical history of Arthritis, Bell's palsy, CAD (coronary artery disease), Gout, Hypertension, Kidney stone, Thyroid disease, and UTI (urinary tract infection). has a past surgical history that includes joint replacement (Left); Coronary angioplasty with stent (2019); Coronary angioplasty with stent (2016); and Coronary artery bypass graft (N/A, 2020).     Restrictions  Restrictions/Precautions  Restrictions/Precautions: Cardiac, General Precautions, Fall Risk  Position Activity Restriction  Sternal Precautions: No Pushing, No Pulling, 5# Lifting Restrictions  Other position/activity restrictions: Medium fall risk per nursing assessment, ambulate pt, up in chair for meals; IJ, telemetry with ICU monitoring, 2 chest tubes, 2L O2  Subjective   General  Chart Reviewed: Yes  Patient assessed for rehabilitation services?: Yes  Family / Caregiver Present: No  Referring note for discharge disposition. Thank you.

## 2020-07-16 NOTE — PROGRESS NOTES
Occupational Therapy  Facility/Department: [facility] B2 - ICU  Daily Treatment Note  NAME: Damir Alba  : 1933  MRN: [id number]    Date of Service: 2020    Discharge Recommendations:  Subacute/Skilled Nursing Facility       Assessment   Performance deficits / Impairments: Decreased functional mobility ; Decreased ADL status; Decreased balance;Decreased endurance;Decreased strength;Decreased safe awareness;Decreased high-level IADLs  Assessment: Pt Jane for LE dressing this date with CGA for standing balance while toileting, though pt continues to require cues for sternal precautions when coming to stand from low chair. OT Education: Plan of Care;Precautions;OT Role;Transfer Training;Equipment;ADL Adaptive Strategies; Family Education  REQUIRES OT FOLLOW UP: Yes  Safety Devices  Safety Devices in place: Yes  Type of devices: Left in chair;Gait belt;Call light within reach; Chair alarm in place;Nurse notified         Patient Diagnosis(es): The primary encounter diagnosis was Chest pain, unspecified type. A diagnosis of Unstable angina (HCC) was also pertinent to this visit. has a past medical history of Arthritis, Bell's palsy, CAD (coronary artery disease), Gout, Hypertension, Kidney stone, Thyroid disease, and UTI (urinary tract infection). has a past surgical history that includes joint replacement (Left); Coronary angioplasty with stent (2019); Coronary angioplasty with stent (2016); and Coronary artery bypass graft (N/A, 2020).     Restrictions  Restrictions/Precautions  Restrictions/Precautions: Cardiac, General Precautions, Fall Risk  Position Activity Restriction  Sternal Precautions: No Pushing, No Pulling, 5# Lifting Restrictions  Other position/activity restrictions: Medium fall risk per nursing assessment, ambulate pt, up in chair for meals; IJ, telemetry with ICU monitoring, 3L O2  Subjective   General  Chart Reviewed: Yes  Patient assessed for rehabilitation services?: Yes  Family / Caregiver Present: Yes(son)  Referring Practitioner: Dr. Sylvia Javier 2020  Diagnosis: CAD, s/p CABG x2 2020  Subjective  Subjective: Pt asking to put his underwear on today. Vital Signs  Patient Currently in Pain: Denies   Orientation     Objective    ADL  LE Dressing: Minimal assistance(briefs)  Toileting: Stand by assistance     Balance  Sitting Balance: Supervision  Standing Balance: Contact guard assistance(1 min standing to toilet)    Functional Mobility  Functional - Mobility Device: 4-Wheeled Walker  Activity: To/from bathroom  Assist Level: Contact guard assistance  Functional Mobility Comments: c/o fatigue and dizziness.     Toilet Transfers  Toilet - Technique: Ambulating(with 4ww)  Equipment Used: Standard toilet  Toilet Transfer: Minimal assistance     Bed mobility  Supine to Sit: Unable to assess  Sit to Supine: Unable to assess  Scootin Person assistance  Comment: LE pt up to chair pre/post session    Transfers  Sit to stand: 2 Person assistance;Minimal assistance(cues for sequencing;sternal precautions)  Stand to sit: Minimal assistance         Cognition  Overall Cognitive Status: WNL  Arousal/Alertness: Appropriate responses to stimuli  Memory: Decreased short term memory  Safety Judgement: Decreased awareness of need for assistance;Decreased awareness of need for safety  Problem Solving: Decreased awareness of errors  Insights: Decreased awareness of deficits  Initiation: Requires cues for some  Sequencing: Requires cues for some         Plan   Plan  Times per week: 4-5x's a week while in ICU           AM-PAC Score        AM-PAC Inpatient Daily Activity Raw Score: 17 (20)  AM-PAC Inpatient ADL T-Scale Score : 37.26 (20)  ADL Inpatient CMS 0-100% Score: 50.11 (20)  ADL Inpatient CMS G-Code Modifier : CK (20)    Goals  Short term goals  Time Frame for Short term goals: 1 week unless otherwise specified   Short term goal 1: Pt will complete LE Dressing with Jane-STG met 7/16  Short term goal 2: Pt will complete toilet transfers with CGA of 1 by 7/19-ongoing eze Lara  Short term goal 3: Pt will complete oral care at sink with CGA for standing balance--ongoing  Patient Goals   Patient goals : \"to get stronger and go to rehab so I can go home\"       Therapy Time   Individual Concurrent Group Co-treatment   Time In       1150   Time Out       1214   Minutes       24           Kiya Neves, OTR/L  If pt is unable to be seen after this session, please let this note serve as discharge summary. Please see case management note for discharge disposition. Thank you.

## 2020-07-16 NOTE — PROGRESS NOTES
4 Eyes Skin Assessment     The patient is being assess for   Shift Handoff    I agree that 2 RN's have performed a thorough Head to Toe Skin Assessment on the patient. ALL assessment sites listed below have been assessed. Areas assessed by both nurses:   [x]   Head, Face, and Ears   [x]   Shoulders, Back, and Chest, Abdomen  [x]   Arms, Elbows, and Hands   [x]   Coccyx, Sacrum, and Ischium  [x]   Legs, Feet, and Heels          **SHARE this note so that the co-signing nurse is able to place an eSignature**    Co-signer eSignature: {Esignature:[id number]}    Does the Patient have Skin Breakdown?   No          Brady Prevention initiated:  No   Wound Care Orders initiated:  No      WOC nurse consulted for Pressure Injury (Stage 3,4, Unstageable, DTI, NWPT, Complex wounds)and New or Established Ostomies:  NA      Primary Nurse eSignature: Electronically signed by Luli Watt RN on 7/15/20 at 8:02 PM EDT

## 2020-07-16 NOTE — CARE COORDINATION
Call received from primary nurse, Sherif Henry, asking about status of skilled facility at discharge. CM did not receive call back from patient's son to confirm location. However, Sherif Henry, states son is at bedside and confirmed [facility] as the chosen facility. CM contacted facility and spoke with Ti Vivar in admissions ([phone]), who states they accepted patient 2 days ago. No message received by case mgmt team. Ti Vivar states they can accept patient today. No cert needed since primary medicare. Information above relayed to primary nurse, Sherif Henry.     Marilou Bronson RN CM

## 2020-07-16 NOTE — CARE COORDINATION
In anticipation of discharge today, transport set up for 1800 with [facility].  PENDING DISCHARGE ORDER AND RESULTED COVID TEST (collected yesterday at 1860)

## 2020-07-16 NOTE — PROGRESS NOTES
[address]  Cardiology  Progress Note    Admission date:  2020    Reason for follow up visit: CAD    HPI/CC: Ivanna Cummings is a 80 y.o. male was admitted 2020 for chest pain. Troponin elevated. [facility] 2020 showed severe LM disease, referred to CT surgery. On 2020 he had CABG x2 and EDWINA clip. Developed atrial fibrillation 7/15/2020. Subjective: Sternal chest discomfort that has been present since surgery he states. Vitals:  Blood pressure (!) 114/49, pulse 86, temperature 98.5 °F (36.9 °C), temperature source Oral, resp. rate 17, height 5' 9\" (1.753 m), weight 207 lb 0.2 oz (93.9 kg), SpO2 95 %.   Temp  Av.2 °F (36.8 °C)  Min: 97.9 °F (36.6 °C)  Max: 98.5 °F (36.9 °C)  Pulse  Av.3  Min: 76  Max: 153  BP  Min: 77/36  Max: 140/72  SpO2  Av.6 %  Min: 91 %  Max: 100 %    24 hour I/O    Intake/Output Summary (Last 24 hours) at 2020 1156  Last data filed at 2020 0935  Gross per 24 hour   Intake 718 ml   Output 2070 ml   Net -1352 ml     Current Facility-Administered Medications   Medication Dose Route Frequency Provider Last Rate Last Dose    bumetanide (BUMEX) injection 1 mg  1 mg Intravenous Once Mukesh Christine MD        famotidine (PEPCID) tablet 20 mg  20 mg Oral BID Taylor Couch MD   20 mg at 20 0935    metoprolol tartrate (LOPRESSOR) tablet 50 mg  50 mg Oral BID Natasha Eric MD   50 mg at 20 3205    amiodarone (CORDARONE) tablet 400 mg  400 mg Oral BID Taylor Couch MD   400 mg at 20 8841    Followed by   Krishna Singh ON 2020] amiodarone (CORDARONE) tablet 200 mg  200 mg Oral Daily Taylor Couch MD        amiodarone (CORDARONE) 450 mg in dextrose 5 % 250 mL infusion  0.5 mg/min Intravenous Continuous Taylor Couch MD 16.7 mL/hr at 20 0430 0.5 mg/min at 20 0430    sodium chloride flush 0.9 % injection 10 mL  10 mL Intravenous 2 times per day Taylor Couch MD   10 mL at 20 0937    sodium chloride flush 0.9 % injection 10 mL  10 mL Intravenous PRN Vee Lyle MD        potassium chloride 20 mEq/50 mL IVPB (Central Line)  20 mEq Intravenous PRN Vee Lyle MD   Stopped at 07/14/20 0345    magnesium sulfate 2 g in 50 mL IVPB premix  2 g Intravenous PRN Vee Lyle MD   Stopped at 07/13/20 2135    oxyCODONE (ROXICODONE) immediate release tablet 5 mg  5 mg Oral Q4H PRN Vee Lyle MD   5 mg at 07/14/20 1012    Or    oxyCODONE (ROXICODONE) immediate release tablet 10 mg  10 mg Oral Q4H PRN Vee Lyle MD   10 mg at 07/15/20 1801    morphine (PF) injection 2 mg  2 mg Intravenous Q2H PRN Vee Lyle MD   2 mg at 07/15/20 1301    Or    morphine (PF) injection 4 mg  4 mg Intravenous Q2H PRN Vee Lyle MD   4 mg at 07/13/20 2133    diphenhydrAMINE (BENADRYL) tablet 25 mg  25 mg Oral Nightly PRN Vee Lyle MD        polyethylene glycol [facility]) packet 17 g  17 g Oral Daily Vee Lyle MD   17 g at 07/16/20 0935    bisacodyl (DULCOLAX) EC tablet 5 mg  5 mg Oral Daily Vee Lyle MD   5 mg at 07/16/20 7567    bisacodyl (DULCOLAX) suppository 10 mg  10 mg Rectal Daily PRN Vee Lyle MD        ondansetron [facility]) injection 4 mg  4 mg Intravenous Q8H PRN Vee Lyle MD   4 mg at 07/15/20 1918    chlorhexidine (PERIDEX) 0.12 % solution 15 mL  15 mL Mouth/Throat BID Vee Lyle MD   15 mL at 07/16/20 9520    furosemide (LASIX) injection 40 mg  40 mg Intravenous BID Vee Lyle MD   40 mg at 07/16/20 8903    magnesium oxide (MAG-OX) tablet 400 mg  400 mg Oral Daily Vee Lyle MD   400 mg at 07/16/20 2819    mupirocin (BACTROBAN) 2 % ointment   Nasal BID Vee Lyle MD        nitroGLYCERIN (NITRODUR) 0.2 MG/HR 1 patch  1 patch Transdermal Daily Vee Lyle MD   Stopped at 07/15/20 7474    potassium chloride (KLOR-CON M) extended release tablet 10 mEq  10 mEq Oral TID  Vee Lyle MD   10 mEq at 07/16/20 0936    fondaparinux (ARIXTRA) injection 2.5 mg  2.5 mg Subcutaneous Daily Madhu Bond MD   2.5 mg at 07/16/20 2181    aspirin EC tablet 81 mg  81 mg Oral Daily Madhu Bond MD   81 mg at 07/16/20 0936    albuterol sulfate  (90 Base) MCG/ACT inhaler 2 puff  2 puff Inhalation Q6H PRN Madhu Bond MD        insulin glargine (LANTUS) injection vial 13 Units  0.15 Units/kg Subcutaneous Nightly Madhu Bond MD   13 Units at 07/15/20 1957    insulin lispro (HUMALOG) injection vial 0-6 Units  0-6 Units Subcutaneous TID WC Madhu Bond MD   1 Units at 07/16/20 0352    insulin lispro (HUMALOG) injection vial 0-3 Units  0-3 Units Subcutaneous Nightly Madhu Bond MD   Stopped at 07/15/20 2003    glucose (GLUTOSE) 40 % oral gel 15 g  15 g Oral PRN Madhu Bond MD        dextrose 50 % IV solution  12.5 g Intravenous PRN Madhu Bond MD        glucagon (rDNA) injection 1 mg  1 mg Intramuscular PRN Madhu Bond MD        dextrose 5 % solution  100 mL/hr Intravenous PRN Madhu Bond MD        clopidogrel (PLAVIX) tablet 75 mg  75 mg Oral Daily Madhu Bond MD   75 mg at 07/16/20 4598    melatonin tablet 5 mg  5 mg Oral Nightly PRN Mloly Vega APRN - CNP        atorvastatin (LIPITOR) tablet 80 mg  80 mg Oral Nightly EDEN Beyer CNP   80 mg at 07/15/20 1953    allopurinol (ZYLOPRIM) tablet 300 mg  300 mg Oral Daily Madhu Bond MD   300 mg at 07/16/20 9837    levothyroxine (SYNTHROID) tablet 50 mcg  50 mcg Oral Daily Madhu Bond MD   50 mcg at 07/16/20 0153     Review of Systems   Constitutional: Negative. Respiratory: Negative. Cardiovascular: Positive for chest pain. Gastrointestinal: Negative. Neurological: Negative.       Objective:     Telemetry monitor: SR    Physical Exam:  Constitutional:  Comfortable and alert, NAD, appears stated age  Eyes: PERRL, sclera nonicteric  Neck:  Supple, no masses, no thyroidmegaly, JVD difficult to assess  Skin:  Warm and dry; no rash or lesions; +sternal incision  Heart:  Regular, normal apex, S1 and S2 normal, no M/G/R  Lungs:  Normal respiratory effort; clear; no wheezing/rhonchi/rales  Abdomen: soft, non tender, + bowel sounds  Extremities:  No edema or cyanosis; no clubbing  Neuro: alert and oriented, moves legs and arms equally, normal mood and affect  Right radial site soft, no hematoma, 2+ pulse, nontender, no ooze, moderate ecchymosis    Data Reviewed:    CABG 7/13/2020:  1. Urgent coronary artery bypass grafting surgery x2 with a single  greater saphenous vein graft to the obtuse marginal branch of the  circumflex, pedicled left internal artery to the LAD. 2.  On-pump beating heart/no cross clamp technique. 3.  Left atrial appendage obliteration with 45-mm AtriCure left atrial  clip. 4.  Lucas-Abbi catheter placement. 5.  Cardiopulmonary bypass. 6.  Endoscopic vein harvest to the right greater saphenous vein. 7.  Transesophageal echo. 8.  Epiaortic ultrasound. 9.  Doppler verification of grafts. 10.  Bilateral five-level intercostal nerve block with Exparel. 11.  Platelet-gel application. Echo 7/11/2020:   Left ventricular systolic function is normal with a visually estimated   ejection fraction of 55%. The left ventricle is normal in size with mild concentric hypertrophy. No regional wall motion abnormalities are noted. Diastolic filling parameters suggests impaired left ventricular relaxation. Coronary angiogram 7/9/2020:  Unstable angina  PROCEDURES PERFORMED    Left heart catheterization  LVgram  Coronary angiogam  Coronary cath  PROCEDURE DESCRIPTION   Risks/benefits/alternatives/outcomes were discussed with patient and/or family and informed consent was obtained. Using the [facility] scale, the patient's right radial artery was found to be a level B. Patient was prepped draped in the usual sterile fashion.   Local anaesthetic was applied over Dr. Ruben Rothman, not MI.      Oswald Coates, APRN-CNP  [facility]  [phone]

## 2020-07-16 NOTE — PROGRESS NOTES
Updated Chiqui Bolanos DCP at this time. Pt not medically ready for D/C to [facility] at this time. Dispo 1-2 days pending BM and stable rhythm for 24 hr > .

## 2020-07-17 LAB
ANION GAP SERPL CALCULATED.3IONS-SCNC: 7 MMOL/L (ref 3–16)
BUN BLDV-MCNC: 30 MG/DL (ref 7–20)
CALCIUM SERPL-MCNC: 8.3 MG/DL (ref 8.3–10.6)
CHLORIDE BLD-SCNC: 101 MMOL/L (ref 99–110)
CO2: 27 MMOL/L (ref 21–32)
CREAT SERPL-MCNC: 0.8 MG/DL (ref 0.8–1.3)
GFR AFRICAN AMERICAN: >60
GFR NON-AFRICAN AMERICAN: >60
GLUCOSE BLD-MCNC: 131 MG/DL (ref 70–99)
HCT VFR BLD CALC: 25.3 % (ref 40.5–52.5)
HEMOGLOBIN: 8.9 G/DL (ref 13.5–17.5)
MAGNESIUM: 1.9 MG/DL (ref 1.8–2.4)
MCH RBC QN AUTO: 32.5 PG (ref 26–34)
MCHC RBC AUTO-ENTMCNC: 35 G/DL (ref 31–36)
MCV RBC AUTO: 92.9 FL (ref 80–100)
PDW BLD-RTO: 14.7 % (ref 12.4–15.4)
PLATELET # BLD: 145 K/UL (ref 135–450)
PMV BLD AUTO: 9 FL (ref 5–10.5)
POTASSIUM SERPL-SCNC: 3.5 MMOL/L (ref 3.5–5.1)
RBC # BLD: 2.72 M/UL (ref 4.2–5.9)
SODIUM BLD-SCNC: 135 MMOL/L (ref 136–145)
WBC # BLD: 10.8 K/UL (ref 4–11)

## 2020-07-17 PROCEDURE — 80048 BASIC METABOLIC PNL TOTAL CA: CPT

## 2020-07-17 PROCEDURE — 2500000003 HC RX 250 WO HCPCS: Performed by: NURSE PRACTITIONER

## 2020-07-17 PROCEDURE — 2580000003 HC RX 258: Performed by: NURSE PRACTITIONER

## 2020-07-17 PROCEDURE — 6370000000 HC RX 637 (ALT 250 FOR IP): Performed by: THORACIC SURGERY (CARDIOTHORACIC VASCULAR SURGERY)

## 2020-07-17 PROCEDURE — 99024 POSTOP FOLLOW-UP VISIT: CPT | Performed by: THORACIC SURGERY (CARDIOTHORACIC VASCULAR SURGERY)

## 2020-07-17 PROCEDURE — 2000000000 HC ICU R&B

## 2020-07-17 PROCEDURE — 36592 COLLECT BLOOD FROM PICC: CPT

## 2020-07-17 PROCEDURE — 6370000000 HC RX 637 (ALT 250 FOR IP): Performed by: NURSE PRACTITIONER

## 2020-07-17 PROCEDURE — 97116 GAIT TRAINING THERAPY: CPT

## 2020-07-17 PROCEDURE — 85027 COMPLETE CBC AUTOMATED: CPT

## 2020-07-17 PROCEDURE — 97530 THERAPEUTIC ACTIVITIES: CPT

## 2020-07-17 PROCEDURE — 97110 THERAPEUTIC EXERCISES: CPT

## 2020-07-17 PROCEDURE — 83735 ASSAY OF MAGNESIUM: CPT

## 2020-07-17 PROCEDURE — 97535 SELF CARE MNGMENT TRAINING: CPT

## 2020-07-17 PROCEDURE — 6360000002 HC RX W HCPCS: Performed by: THORACIC SURGERY (CARDIOTHORACIC VASCULAR SURGERY)

## 2020-07-17 PROCEDURE — 2580000003 HC RX 258: Performed by: THORACIC SURGERY (CARDIOTHORACIC VASCULAR SURGERY)

## 2020-07-17 PROCEDURE — 6360000002 HC RX W HCPCS: Performed by: NURSE PRACTITIONER

## 2020-07-17 RX ORDER — SPIRONOLACTONE 25 MG/1
25 TABLET ORAL DAILY
Status: DISCONTINUED | OUTPATIENT
Start: 2020-07-17 | End: 2020-07-19 | Stop reason: HOSPADM

## 2020-07-17 RX ADMIN — Medication 15 ML: at 11:04

## 2020-07-17 RX ADMIN — DILTIAZEM HYDROCHLORIDE 5 MG/HR: 5 INJECTION INTRAVENOUS at 11:44

## 2020-07-17 RX ADMIN — ALLOPURINOL 300 MG: 300 TABLET ORAL at 11:01

## 2020-07-17 RX ADMIN — MUPIROCIN: 20 OINTMENT TOPICAL at 11:04

## 2020-07-17 RX ADMIN — POTASSIUM CHLORIDE 10 MEQ: 10 TABLET, EXTENDED RELEASE ORAL at 11:01

## 2020-07-17 RX ADMIN — Medication 15 ML: at 20:36

## 2020-07-17 RX ADMIN — MAGNESIUM GLUCONATE 500 MG ORAL TABLET 400 MG: 500 TABLET ORAL at 11:04

## 2020-07-17 RX ADMIN — MUPIROCIN: 20 OINTMENT TOPICAL at 20:36

## 2020-07-17 RX ADMIN — BISACODYL 10 MG: 10 SUPPOSITORY RECTAL at 06:04

## 2020-07-17 RX ADMIN — ASPIRIN 81 MG: 81 TABLET ORAL at 11:02

## 2020-07-17 RX ADMIN — AMIODARONE HYDROCHLORIDE 400 MG: 200 TABLET ORAL at 11:03

## 2020-07-17 RX ADMIN — OXYCODONE 5 MG: 5 TABLET ORAL at 11:42

## 2020-07-17 RX ADMIN — OXYCODONE 10 MG: 5 TABLET ORAL at 20:35

## 2020-07-17 RX ADMIN — FAMOTIDINE 20 MG: 20 TABLET, FILM COATED ORAL at 11:03

## 2020-07-17 RX ADMIN — POTASSIUM CHLORIDE 10 MEQ: 10 TABLET, EXTENDED RELEASE ORAL at 18:17

## 2020-07-17 RX ADMIN — SPIRONOLACTONE 25 MG: 25 TABLET ORAL at 13:28

## 2020-07-17 RX ADMIN — APIXABAN 5 MG: 5 TABLET, FILM COATED ORAL at 20:35

## 2020-07-17 RX ADMIN — APIXABAN 5 MG: 5 TABLET, FILM COATED ORAL at 11:02

## 2020-07-17 RX ADMIN — POTASSIUM CHLORIDE 10 MEQ: 10 TABLET, EXTENDED RELEASE ORAL at 13:29

## 2020-07-17 RX ADMIN — BISACODYL 5 MG: 5 TABLET, COATED ORAL at 11:02

## 2020-07-17 RX ADMIN — FUROSEMIDE 40 MG: 10 INJECTION, SOLUTION INTRAMUSCULAR; INTRAVENOUS at 18:17

## 2020-07-17 RX ADMIN — ATORVASTATIN CALCIUM 80 MG: 80 TABLET, FILM COATED ORAL at 20:35

## 2020-07-17 RX ADMIN — POLYETHYLENE GLYCOL 3350 17 G: 17 POWDER, FOR SOLUTION ORAL at 11:01

## 2020-07-17 RX ADMIN — AMIODARONE HYDROCHLORIDE 75 MG: 50 INJECTION, SOLUTION INTRAVENOUS at 07:50

## 2020-07-17 RX ADMIN — METOPROLOL TARTRATE 50 MG: 50 TABLET, FILM COATED ORAL at 11:02

## 2020-07-17 RX ADMIN — FAMOTIDINE 20 MG: 20 TABLET, FILM COATED ORAL at 20:35

## 2020-07-17 RX ADMIN — LEVOTHYROXINE SODIUM 50 MCG: 0.05 TABLET ORAL at 06:04

## 2020-07-17 RX ADMIN — Medication 10 ML: at 20:36

## 2020-07-17 RX ADMIN — FUROSEMIDE 40 MG: 10 INJECTION, SOLUTION INTRAMUSCULAR; INTRAVENOUS at 11:03

## 2020-07-17 RX ADMIN — Medication 10 ML: at 11:05

## 2020-07-17 RX ADMIN — MELATONIN TAB 5 MG 5 MG: 5 TAB at 20:35

## 2020-07-17 RX ADMIN — METOPROLOL TARTRATE 50 MG: 50 TABLET, FILM COATED ORAL at 20:35

## 2020-07-17 ASSESSMENT — PAIN SCALES - GENERAL
PAINLEVEL_OUTOF10: 0
PAINLEVEL_OUTOF10: 0
PAINLEVEL_OUTOF10: 6
PAINLEVEL_OUTOF10: 0
PAINLEVEL_OUTOF10: 0
PAINLEVEL_OUTOF10: 7
PAINLEVEL_OUTOF10: 0

## 2020-07-17 ASSESSMENT — PAIN DESCRIPTION - LOCATION: LOCATION: STERNUM

## 2020-07-17 ASSESSMENT — PAIN DESCRIPTION - PAIN TYPE: TYPE: SURGICAL PAIN

## 2020-07-17 ASSESSMENT — PAIN DESCRIPTION - ONSET: ONSET: ON-GOING

## 2020-07-17 ASSESSMENT — PAIN DESCRIPTION - FREQUENCY: FREQUENCY: CONTINUOUS

## 2020-07-17 ASSESSMENT — PAIN DESCRIPTION - PROGRESSION: CLINICAL_PROGRESSION: NOT CHANGED

## 2020-07-17 ASSESSMENT — PAIN DESCRIPTION - DESCRIPTORS: DESCRIPTORS: SORE;ACHING

## 2020-07-17 ASSESSMENT — PAIN DESCRIPTION - ORIENTATION: ORIENTATION: MID

## 2020-07-17 NOTE — PROGRESS NOTES
4 Eyes Skin Assessment     The patient is being assess for   Shift Handoff    I agree that 2 RN's have performed a thorough Head to Toe Skin Assessment on the patient. ALL assessment sites listed below have been assessed. Areas assessed by both nurses:   [x]   Head, Face, and Ears   [x]   Shoulders, Back, and Chest, Abdomen  [x]   Arms, Elbows, and Hands   [x]   Coccyx, Sacrum, and Ischium  [x]   Legs, Feet, and Heels          **SHARE this note so that the co-signing nurse is able to place an eSignature**    Co-signer eSignature: Electronically signed by Mohan Harris RN on 7/17/20 at 10:39 PM EDT    Does the Patient have Skin Breakdown?   Yes LDA WOUND CARE was Initiated documentation include the Zina-wound, Wound Assessment, Measurements, Dressing Treatment, Drainage, and Color\",          Brady Prevention initiated:  Yes   Wound Care Orders initiated:  NA      WOC nurse consulted for Pressure Injury (Stage 3,4, Unstageable, DTI, NWPT, Complex wounds)and New or Established Ostomies:  NA      Primary Nurse eSignature: Electronically signed by Amilcar Landis RN on 7/17/20 at 7:05 PM EDT

## 2020-07-17 NOTE — PROGRESS NOTES
Physical Therapy  Facility/Department: [facility] B2 - ICU  Daily Treatment Note  NAME: Armando Perry  : 1933  MRN: [id number]    Date of Service: 2020    Discharge Recommendations:  Subacute/Skilled Nursing Facility        Assessment   Body structures, Functions, Activity limitations: Decreased functional mobility ; Decreased strength;Decreased endurance;Decreased balance; Increased pain  Assessment: Pt ambulated 225 ft on RA ith 4WW and CGA, sit<>stand required Jane and verbal cues to maintain precautions. Pt is distractible but redirectable. Cont per POC to address deficits. Treatment Diagnosis: Decreased endurance and (I) with mobility  Specific instructions for Next Treatment: Progress ther ex and mobility as tolerated  Prognosis: Good  Decision Making: Medium Complexity  PT Education: Goals;PT Role;Plan of Care;Precautions;Transfer Training;Energy Conservation; Functional Mobility Training;Disease Specific Education;Gait Training;General Safety;Equipment;Weight-bearing Education; Family Education;Home Exercise Program  Patient Education: Pt verbalizes understanding. Barriers to Learning: Impaired memory at times, distractable  REQUIRES PT FOLLOW UP: Yes  Activity Tolerance  Activity Tolerance: Patient Tolerated treatment well;Patient limited by endurance  Activity Tolerance: VSS on room air     Patient Diagnosis(es): The primary encounter diagnosis was Chest pain, unspecified type. A diagnosis of Unstable angina (HCC) was also pertinent to this visit. has a past medical history of Arthritis, Bell's palsy, CAD (coronary artery disease), Gout, Hypertension, Kidney stone, Thyroid disease, and UTI (urinary tract infection). has a past surgical history that includes joint replacement (Left); Coronary angioplasty with stent (2019); Coronary angioplasty with stent (2016); and Coronary artery bypass graft (N/A, 2020).     Restrictions  Restrictions/Precautions  Restrictions/Precautions: Cardiac,

## 2020-07-17 NOTE — PROGRESS NOTES
4 Eyes Skin Assessment     The patient is being assess for   Shift Handoff    I agree that 2 RN's have performed a thorough Head to Toe Skin Assessment on the patient. ALL assessment sites listed below have been assessed. Areas assessed by both nurses:   [x]   Head, Face, and Ears   [x]   Shoulders, Back, and Chest, Abdomen  [x]   Arms, Elbows, and Hands   [x]   Coccyx, Sacrum, and Ischium  [x]   Legs, Feet, and Heels            **SHARE this note so that the co-signing nurse is able to place an eSignature**    Co-signer eSignature: Electronically signed by Tabatha Raphael RN on 7/17/20 at 4:30 PM EDT    Does the Patient have Skin Breakdown?   No          Brady Prevention initiated:  Yes   Wound Care Orders initiated:  No      [facility] nurse consulted for Pressure Injury (Stage 3,4, Unstageable, DTI, NWPT, Complex wounds)and New or Established Ostomies:  No      Primary Nurse eSignature: Electronically signed by Bruce Johnson RN on 7/17/20 at 7:34 AM EDT

## 2020-07-17 NOTE — PROGRESS NOTES
Patient stated that he had his son emptying his urinal during their his visit. States that he has been \"peeing like crazy\" and that his son had emptied the urinal five times. Explained to the patient that were are collecting strict intake and output, which involves counting all that he takes in and puts out. Reiterated fluid restriction. Patient states and demonstrates understanding.     Mina Pedraza RN

## 2020-07-17 NOTE — PROGRESS NOTES
Occupational Therapy  Facility/Department: [facility] B2 - ICU  Daily Treatment Note  NAME: Gale Garcia  : 1933  MRN: [id number]    Date of Service: 2020    Discharge Recommendations:  Subacute/Skilled Nursing Facility     Assessment   Performance deficits / Impairments: Decreased functional mobility ; Decreased ADL status; Decreased balance;Decreased endurance;Decreased strength;Decreased safe awareness;Decreased high-level IADLs  Assessment: Pt performed functional transfers with CGA/min A today with 0KT. Instruction provided on sternal precautions but he needs additional instruction. Cont OT. Prognosis: Good  OT Education: Plan of Care;Precautions;OT Role;Transfer Training;Equipment;ADL Adaptive Strategies; Family Education  REQUIRES OT FOLLOW UP: Yes  Activity Tolerance  Activity Tolerance: Patient Tolerated treatment well  Safety Devices  Type of devices: Left in chair;Gait belt;Call light within reach; Chair alarm in place;Nurse notified       Patient Diagnosis(es): The primary encounter diagnosis was Chest pain, unspecified type. A diagnosis of Unstable angina (HCC) was also pertinent to this visit. has a past medical history of Arthritis, Bell's palsy, CAD (coronary artery disease), Gout, Hypertension, Kidney stone, Thyroid disease, and UTI (urinary tract infection). has a past surgical history that includes joint replacement (Left); Coronary angioplasty with stent (2019); Coronary angioplasty with stent (2016); and Coronary artery bypass graft (N/A, 2020).     Restrictions  Restrictions/Precautions  Restrictions/Precautions: Cardiac, General Precautions, Fall Risk  Position Activity Restriction  Sternal Precautions: No Pushing, No Pulling, 5# Lifting Restrictions  Other position/activity restrictions: Medium fall risk per nursing assessment, ambulate pt, up in chair for meals; IJ, telemetry with ICU monitoring  Subjective   General  Chart Reviewed: Yes  Patient assessed for rehabilitation services?: Yes  Family / Caregiver Present: No  Referring Practitioner: Dr. Gilman Adjutant 7/13/2020  Diagnosis: CAD, s/p CABG x2 7/13/2020  Subjective  Subjective: Pt agreeable to OT  General Comment  Comments: RN approved therapy  Vital Signs  Patient Currently in Pain: Denies   Orientation  Orientation  Overall Orientation Status: Within Functional Limits  Objective    ADL  Grooming: Contact guard assistance(stand at sink to wash face and arms, comb hair  (Stood >8 min))  LE Dressing: Minimal assistance     Balance  Sitting Balance: Supervision  Standing Balance: Contact guard assistance(4WW)  Functional Mobility  Functional - Mobility Device: 4-Wheeled Walker  Activity: To/from bathroom  Assist Level: Contact guard assistance  Functional Mobility Comments: vc's for safety     Transfers  Stand Pivot Transfers: Contact guard assistance(4WW)  Sit to stand: Minimal assistance  Stand to sit: Minimal assistance       Cognition  Following Commands: Follows multistep commands with increased time; Follows multistep commands consistently  Attention Span: Attends with cues to redirect  Memory: Decreased short term memory  Safety Judgement: Decreased awareness of need for assistance;Decreased awareness of need for safety  Problem Solving: Decreased awareness of errors  Insights: Decreased awareness of deficits  Initiation: Requires cues for some  Sequencing: Requires cues for some      Type of ROM/Therapeutic Exercise  Type of ROM/Therapeutic Exercise: AROM  Comment: seated, performed within sternal precautions  Exercises  Shoulder Elevation: 10x  Elbow Flexion: 10x  Elbow Extension: 10x  Supination: 10x  Pronation: 10x  Grasp/Release: 10x      Plan   Plan  Times per week: 4-5x's a week while in ICU    AM-PAC Score      AM-PAC Inpatient Daily Activity Raw Score: 17 (07/17/20 1221)  AM-PAC Inpatient ADL T-Scale Score : 37.26 (07/17/20 1221)  ADL Inpatient CMS 0-100% Score: 50.11 (07/17/20 1221)  ADL Inpatient CMS G-Code Modifier : CK (07/17/20 1221)  Goals  Short term goals  Time Frame for Short term goals: 1 week unless otherwise specified 7/21  Short term goal 1: Pt will complete LE Dressing with Jane-STG met 7/16  Short term goal 2: Pt will complete toilet transfers with CGA of 1 by 7/19-ongoing currenlty Jane  Short term goal 3: Pt will complete oral care at sink with CGA for standing balance--ongoing  Short term goal 4: Follow sternal precautions independently during ADLs and transfers-ongoing, needs additional instructions  Patient Goals   Patient goals : \"to get stronger and go to rehab so I can go home\"     Therapy Time   Individual Concurrent Group Co-treatment   Time In 0942         Time Out 1022         Minutes 40         Timed Code Treatment Minutes: 40 Minutes     If pt is discharged prior to next OT session, this note will serve as the discharge summary.   Valentino Baton OT

## 2020-07-17 NOTE — PROGRESS NOTES
CVTS Cardiothoracic Progress Note:                                CC:  Post op follow up     Surgery: 7/13 Urgent CABG X 2, with pedicled LIMA to LAD, single Greater Saphenous VG to OM, EDWINA obliteration with 45 mm Atricure LA Clip, SGC, CPB, EVH Right Greater Saphenous vein, ELISEO, Epiaortic ultrasound, Doppler verification of grafts, Bilateral 5 level intercostal nerve block(Exparel), Platelet gel application. Hospital course:  7/14 Sitting up in chair, c/o pain to CT site overnight. 7/15 Sitting up in chair, reports significant improvement in pain overnight. Wanting to eat breakfast.   7/16 awake, alert and up in chair. TPW and CTs out yesterday. Went into a-fib yesterday evening. 7/17 In SR until early this morning, back in a-fib. Otherwise no complaints overnight. Appetite and PO intake improving. Subjective:   Dietary Intake: improving.     No Nausea    Pain Control: controlled, PRN pain meds   Complaints: none  Bowels: BM 7/17       Past Medical History:   Diagnosis Date    Arthritis     Bell's palsy     CAD (coronary artery disease)     Gout     Hypertension     Kidney stone     Thyroid disease     UTI (urinary tract infection)         Past Surgical History:   Procedure Laterality Date    CORONARY ANGIOPLASTY WITH STENT PLACEMENT  07/25/2019    Dr Samir Cage, Rotational Atherectomy and BRET x 3 to LM/LAD    CORONARY ANGIOPLASTY WITH STENT PLACEMENT  12/22/2016    Dr Samir Cage, Orbital Atherectomy and BRET x 2 to LAD - Synergy 2.75x16 and 3x8, post dilated to 3mm NC.     CORONARY ARTERY BYPASS GRAFT N/A 7/13/2020    CORONARY ARTERY BYPASS GRAFTING X2, INTERNAL MAMMARY ARTERY, SAPHENOUS VEIN GRAFT, ON PUMP BEATING HEART,  45MM EDWINA CLIP, 5 LEVEL BILATERAL INTERCOSTAL NERVE BLOCK performed by Madhu Bond MD at [address] [place] [address] Left         Allergies as of 07/08/2020    (No Known Allergies)        Patient Active Problem List   Diagnosis    Coronary artery disease involving native coronary artery of native heart with unstable angina pectoris ([facility]    Essential hypertension    Mixed hyperlipidemia    Obesity    Angina, class III ([facility]    Unstable angina ([facility]    Chest pain    NSTEMI (non-ST elevated myocardial infarction) ([facility]    Dyslipidemia    Left main coronary artery disease        Vital Signs: BP (!) 101/56   Pulse 122   Temp 98.1 °F (36.7 °C) (Oral)   Resp 18   Ht 5' 9\" (1.753 m)   Wt 207 lb 4.8 oz (94 kg)   SpO2 91%   BMI 30.61 kg/m²  O2 Flow Rate (L/min): 0 L/min     Admission Weight: Weight: 202 lb (91.6 kg)    Weight on 7/13 (88.5 kg) Pre-op   7/14 96.4 kg   7/15 94.5 kg  7/16 93.9 kg   7/17 94 kg     Intake/Output:     Intake/Output Summary (Last 24 hours) at 7/17/2020 1105  Last data filed at 7/17/2020 0603  Gross per 24 hour   Intake 270 ml   Output 1325 ml   Net -1055 ml        Extubation Time: 7/13 @ 1226 PM   Transition Time: 7/14 @ 2:24 AM     LABORATORY DATA:     CBC:   Recent Labs     07/15/20  0540 07/16/20  0644 07/17/20  0550   WBC 11.0 12.9* 10.8   HGB 9.3* 8.8* 8.9*   HCT 27.4* 26.2* 25.3*   MCV 93.2 93.0 92.9   * 132* 145     BMP:   Recent Labs     07/15/20  0540 07/16/20  0644 07/17/20  0550    136 135*   K 4.2 4.0 3.5    101 101   CO2 24 26 27   BUN 21* 29* 30*   CREATININE 0.9 1.0 0.8     MG:    Recent Labs     07/15/20  0540 07/16/20  0644 07/17/20  0550   MG 1.90 2.00 1.90        CXR: 7/13/20  FINDINGS:    Cardiac silhouette is enlarged.  Left-sided chest tube, new.  [place]-Abbi    catheter with tip projecting in the region of a pulmonary artery going    towards the right lower lobe.  No pneumothorax.  Small right pleural    effusion.  Pulmonary vascular congestion. ________________________________________________________________________      Objective:   General appearance: awake, alert, in nad   Lungs: diminished  Heart: irregularly irregular rhythm.  A-fib, rate 130    Chest: symmetrical expansion with inspiration and expirations; no rocking of sternum noted   Abdomen: soft, non-tender   Bowel sounds: normoactive   Kidneys: UOP 1475 for the past 24 hours. Cr 0.8  Wound/Incisions: Midsternal incision CDI; RLE incisions CDI; Pacing wires out 7/15. Extremities: BLE pulses present; 1+ swelling noted in BLE  Neurological: awake, alert, no focal deficits   Chest tubes/Drains: Chest tube # 1 and chest tube #2 out 7/15.     _Assessment:   Post-op: 4 days. Condition: In stable condition. Plan:   1. Cardiovascular: S/P CABG x 2 with EDWINA clip. ASA, statin, BB. Went into a-fib around 7:20 PM 7/15. Started on PO amio yesterday (7/17). [place] for most of the day yesterday, back in a-fib early this morning, rate 120s-130. 75 mg amio bolus ordered without improvement. cardizem gtt ordered.   -Plavix d/c'd, eliquis started. 2. Pulmonary: expansion measures- IS, OOBTC, PT/OT, ambulation. On room air. 3. Neurology: continue analgesia as needed     4. Nephrology: fluid management- diurese as tolerated. Lasix IV BID. Weight still up from pre-op weight and no improvement since yesterday. One time dose of Bumex ordered 7/16. Will add spironolactone.   -Pt reporting some difficulty urinating yesterday. Denies any difficulty this morning. 5. Endocrinology: no issues. 6. Hematology: acute blood loss anemia. Will continue to trend. 7. Microbiology: No issues. 8. Nutrition: ADAT. Intake improving. Continue ONS per dietitian recs. 9. Labs: continue to monitor      10. Post-op Drains/Wires: jennings d/c'd 7/15. TPW and CTs d/c'd 7/15. 11. D/C Goals: D/C planning following. Planning for SNF (possibly [facility]). Covid surveillance ordered.      12. Continue post-op care of patient in the ICU    Meds:    The patient is on a beta-blocker   The patient is not on an ace-i/ARB- not indicated    The patient is on a statin   The patient is on oral antiplatelet therapy     Aster Argueta CNP  7/17/2020  11:05 AM   Note reviewed, events of last 24 hours reviewed along with vital signs and I/Os and patient examined. Assessment and plans discussed and are as outlined above.      Julieta Leventhal, MD, FACS, FACC, [facility]  _________________________________________________________

## 2020-07-18 LAB
ANION GAP SERPL CALCULATED.3IONS-SCNC: 8 MMOL/L (ref 3–16)
BUN BLDV-MCNC: 23 MG/DL (ref 7–20)
CALCIUM SERPL-MCNC: 8.5 MG/DL (ref 8.3–10.6)
CHLORIDE BLD-SCNC: 100 MMOL/L (ref 99–110)
CO2: 29 MMOL/L (ref 21–32)
CREAT SERPL-MCNC: 0.8 MG/DL (ref 0.8–1.3)
GFR AFRICAN AMERICAN: >60
GFR NON-AFRICAN AMERICAN: >60
GLUCOSE BLD-MCNC: 138 MG/DL (ref 70–99)
GLUCOSE BLD-MCNC: 140 MG/DL (ref 70–99)
GLUCOSE BLD-MCNC: 155 MG/DL (ref 70–99)
GLUCOSE BLD-MCNC: 199 MG/DL (ref 70–99)
HCT VFR BLD CALC: 25.5 % (ref 40.5–52.5)
HEMOGLOBIN: 8.8 G/DL (ref 13.5–17.5)
MAGNESIUM: 1.9 MG/DL (ref 1.8–2.4)
MAGNESIUM: 2.4 MG/DL (ref 1.8–2.4)
MCH RBC QN AUTO: 32.1 PG (ref 26–34)
MCHC RBC AUTO-ENTMCNC: 34.6 G/DL (ref 31–36)
MCV RBC AUTO: 92.7 FL (ref 80–100)
PDW BLD-RTO: 15 % (ref 12.4–15.4)
PERFORMED ON: ABNORMAL
PLATELET # BLD: 162 K/UL (ref 135–450)
PMV BLD AUTO: 8.8 FL (ref 5–10.5)
POTASSIUM SERPL-SCNC: 3.6 MMOL/L (ref 3.5–5.1)
RBC # BLD: 2.75 M/UL (ref 4.2–5.9)
REPORT: NORMAL
SARS-COV-2: NOT DETECTED
SODIUM BLD-SCNC: 137 MMOL/L (ref 136–145)
THIS TEST SENT TO: NORMAL
WBC # BLD: 10.5 K/UL (ref 4–11)

## 2020-07-18 PROCEDURE — 6370000000 HC RX 637 (ALT 250 FOR IP): Performed by: NURSE PRACTITIONER

## 2020-07-18 PROCEDURE — 2580000003 HC RX 258: Performed by: THORACIC SURGERY (CARDIOTHORACIC VASCULAR SURGERY)

## 2020-07-18 PROCEDURE — 99024 POSTOP FOLLOW-UP VISIT: CPT | Performed by: THORACIC SURGERY (CARDIOTHORACIC VASCULAR SURGERY)

## 2020-07-18 PROCEDURE — 6370000000 HC RX 637 (ALT 250 FOR IP): Performed by: THORACIC SURGERY (CARDIOTHORACIC VASCULAR SURGERY)

## 2020-07-18 PROCEDURE — 97530 THERAPEUTIC ACTIVITIES: CPT

## 2020-07-18 PROCEDURE — 6360000002 HC RX W HCPCS: Performed by: THORACIC SURGERY (CARDIOTHORACIC VASCULAR SURGERY)

## 2020-07-18 PROCEDURE — 2000000000 HC ICU R&B

## 2020-07-18 PROCEDURE — 85027 COMPLETE CBC AUTOMATED: CPT

## 2020-07-18 PROCEDURE — 83735 ASSAY OF MAGNESIUM: CPT

## 2020-07-18 PROCEDURE — 80048 BASIC METABOLIC PNL TOTAL CA: CPT

## 2020-07-18 PROCEDURE — 97116 GAIT TRAINING THERAPY: CPT

## 2020-07-18 RX ORDER — AMIODARONE HYDROCHLORIDE 200 MG/1
200 TABLET ORAL 2 TIMES DAILY
Status: DISCONTINUED | OUTPATIENT
Start: 2020-07-23 | End: 2020-07-19 | Stop reason: HOSPADM

## 2020-07-18 RX ORDER — DILTIAZEM HYDROCHLORIDE 60 MG/1
30 TABLET, FILM COATED ORAL EVERY 12 HOURS SCHEDULED
Status: DISCONTINUED | OUTPATIENT
Start: 2020-07-18 | End: 2020-07-19 | Stop reason: HOSPADM

## 2020-07-18 RX ADMIN — ATORVASTATIN CALCIUM 80 MG: 80 TABLET, FILM COATED ORAL at 20:28

## 2020-07-18 RX ADMIN — SPIRONOLACTONE 25 MG: 25 TABLET ORAL at 09:19

## 2020-07-18 RX ADMIN — POTASSIUM CHLORIDE 10 MEQ: 10 TABLET, EXTENDED RELEASE ORAL at 09:19

## 2020-07-18 RX ADMIN — POTASSIUM CHLORIDE 10 MEQ: 10 TABLET, EXTENDED RELEASE ORAL at 12:40

## 2020-07-18 RX ADMIN — FUROSEMIDE 40 MG: 10 INJECTION, SOLUTION INTRAMUSCULAR; INTRAVENOUS at 09:17

## 2020-07-18 RX ADMIN — Medication 10 ML: at 20:28

## 2020-07-18 RX ADMIN — POLYETHYLENE GLYCOL 3350 17 G: 17 POWDER, FOR SOLUTION ORAL at 11:07

## 2020-07-18 RX ADMIN — OXYCODONE 10 MG: 5 TABLET ORAL at 09:09

## 2020-07-18 RX ADMIN — OXYCODONE 5 MG: 5 TABLET ORAL at 18:44

## 2020-07-18 RX ADMIN — DIPHENHYDRAMINE HCL 25 MG: 25 TABLET ORAL at 20:27

## 2020-07-18 RX ADMIN — DILTIAZEM HYDROCHLORIDE 30 MG: 60 TABLET, FILM COATED ORAL at 10:36

## 2020-07-18 RX ADMIN — MUPIROCIN: 20 OINTMENT TOPICAL at 09:19

## 2020-07-18 RX ADMIN — DILTIAZEM HYDROCHLORIDE 30 MG: 60 TABLET, FILM COATED ORAL at 20:27

## 2020-07-18 RX ADMIN — Medication 15 ML: at 20:28

## 2020-07-18 RX ADMIN — ALLOPURINOL 300 MG: 300 TABLET ORAL at 09:19

## 2020-07-18 RX ADMIN — ASPIRIN 81 MG: 81 TABLET ORAL at 09:17

## 2020-07-18 RX ADMIN — BISACODYL 5 MG: 5 TABLET, COATED ORAL at 09:17

## 2020-07-18 RX ADMIN — METOPROLOL TARTRATE 50 MG: 50 TABLET, FILM COATED ORAL at 08:20

## 2020-07-18 RX ADMIN — MAGNESIUM GLUCONATE 500 MG ORAL TABLET 400 MG: 500 TABLET ORAL at 09:17

## 2020-07-18 RX ADMIN — OXYCODONE 5 MG: 5 TABLET ORAL at 12:38

## 2020-07-18 RX ADMIN — APIXABAN 5 MG: 5 TABLET, FILM COATED ORAL at 09:19

## 2020-07-18 RX ADMIN — Medication 10 ML: at 09:18

## 2020-07-18 RX ADMIN — Medication 15 ML: at 10:39

## 2020-07-18 RX ADMIN — POTASSIUM CHLORIDE 10 MEQ: 10 TABLET, EXTENDED RELEASE ORAL at 17:43

## 2020-07-18 RX ADMIN — MAGNESIUM SULFATE 2 G: 2 INJECTION INTRAVENOUS at 09:20

## 2020-07-18 RX ADMIN — ONDANSETRON 4 MG: 2 INJECTION INTRAMUSCULAR; INTRAVENOUS at 06:19

## 2020-07-18 RX ADMIN — LEVOTHYROXINE SODIUM 50 MCG: 0.05 TABLET ORAL at 09:17

## 2020-07-18 RX ADMIN — FAMOTIDINE 20 MG: 20 TABLET, FILM COATED ORAL at 20:27

## 2020-07-18 RX ADMIN — APIXABAN 5 MG: 5 TABLET, FILM COATED ORAL at 20:27

## 2020-07-18 RX ADMIN — FAMOTIDINE 20 MG: 20 TABLET, FILM COATED ORAL at 09:19

## 2020-07-18 RX ADMIN — FUROSEMIDE 40 MG: 10 INJECTION, SOLUTION INTRAMUSCULAR; INTRAVENOUS at 17:43

## 2020-07-18 ASSESSMENT — PAIN DESCRIPTION - LOCATION
LOCATION: STERNUM
LOCATION: STERNUM

## 2020-07-18 ASSESSMENT — PAIN SCALES - GENERAL
PAINLEVEL_OUTOF10: 10
PAINLEVEL_OUTOF10: 2
PAINLEVEL_OUTOF10: 5
PAINLEVEL_OUTOF10: 4
PAINLEVEL_OUTOF10: 5
PAINLEVEL_OUTOF10: 10
PAINLEVEL_OUTOF10: 5

## 2020-07-18 ASSESSMENT — PAIN DESCRIPTION - PAIN TYPE
TYPE: SURGICAL PAIN
TYPE: ACUTE PAIN;SURGICAL PAIN

## 2020-07-18 ASSESSMENT — PAIN DESCRIPTION - DESCRIPTORS: DESCRIPTORS: ACHING

## 2020-07-18 ASSESSMENT — PAIN DESCRIPTION - ORIENTATION: ORIENTATION: MID

## 2020-07-18 NOTE — PROGRESS NOTES
Awake alert and oriented in moderate amount of surgical discomfort. Cardizem gtt remains at 5 mg/hr. In sinus rhythm at present.

## 2020-07-18 NOTE — PROGRESS NOTES
UTI (urinary tract infection). has a past surgical history that includes joint replacement (Left); Coronary angioplasty with stent (07/25/2019); Coronary angioplasty with stent (12/22/2016); and Coronary artery bypass graft (N/A, 7/13/2020). Restrictions  Restrictions/Precautions  Restrictions/Precautions: Cardiac, General Precautions, Fall Risk  Position Activity Restriction  Sternal Precautions: No Pushing, No Pulling, 5# Lifting Restrictions  Other position/activity restrictions: Medium fall risk per nursing assessment, ambulate pt, up in chair for meals; IJ, telemetry with ICU monitoring  Subjective   General  Chart Reviewed: Yes  Response To Previous Treatment: Patient with no complaints from previous session. Family / Caregiver Present: No  Referring Practitioner: Carey Kuhn MD  Subjective  Subjective: Pt supine in bed on approach, pleasant and agreeable to PT tx  General Comment  Comments: RN cleared pt for session, requesting to limit ambulation secondary to increased HR this date  Pain Screening  Patient Currently in Pain: Yes  Pain Assessment  Pain Assessment: 0-10  Pain Level: 5  Pain Type: Acute pain;Surgical pain  Pain Location: Sternum  Non-Pharmaceutical Pain Intervention(s): Ambulation/Increased Activity;Repositioned; Therapeutic presence;Distraction  Vital Signs  Patient Currently in Pain: Yes       Orientation  Orientation  Overall Orientation Status: Within Functional Limits  Cognition   Cognition  Overall Cognitive Status: Exceptions  Arousal/Alertness: Appropriate responses to stimuli  Following Commands: Follows multistep commands with increased time; Follows multistep commands consistently  Attention Span: Attends with cues to redirect  Memory: Decreased short term memory  Safety Judgement: Decreased awareness of need for assistance;Decreased awareness of need for safety  Problem Solving: Decreased awareness of errors  Insights: Decreased awareness of deficits  Initiation: Requires cues for some  Sequencing: Requires cues for some  Objective   Bed mobility  Supine to Sit: Minimal assistance(To L with HOB elevated, Jane for trunk, maintains sternal precautions with minimal cues)  Sit to Supine: Unable to assess(Pt seated in chair at end of session)  Scooting: Minimal assistance(To scoot to EOB)     Transfers  Sit to Stand: Minimal Assistance;Contact guard assistance  Stand to sit: Contact guard assistance  Comment: EOB to RW CGA, commode to RW Jane, cues to maintain sternal precautions     Ambulation  Ambulation?: Yes  Ambulation 1  Surface: level tile  Device: Rolling Walker  Assistance: Contact guard assistance  Quality of Gait: Pt with mild unsteadiness with no LOB, widened ARNAV,  verbal cues to keep ARNAV closer to walker, easily distractible. Cues for safety  Gait Deviations: Slow Kaya;Decreased step length;Decreased step height  Distance: 15' x 2  Comments: Distances limited secondary to increased HR, mainainted 130-134 at rest and with gait activities  Stairs/Curb  Stairs?: Yes  Stairs  # Steps : 1  Curbs: 2\"  Assistance: Contact guard assistance  Comment: Pt ascend/descend 2\" step onto scale with grab bar.  Cues for sequence/safety     Balance  Posture: Fair  Sitting - Static: Good  Sitting - Dynamic: Good;-  Standing - Static: Good;-  Standing - Dynamic: Fair;+  Comments: CGA/SBA with RW                          AM-PAC Score  AM-PAC Inpatient Mobility Raw Score : 17 (07/18/20 1639)  AM-PAC Inpatient T-Scale Score : 42.13 (07/18/20 1639)  Mobility Inpatient CMS 0-100% Score: 50.57 (07/18/20 1639)  Mobility Inpatient CMS G-Code Modifier : CK (07/18/20 1639)          Goals  Short term goals  Time Frame for Short term goals: 1 week, 7/21/20 (unless otherwise specified)  Short term goal 1: Pt will transfer supine <-> sit with modA x 1 -- 7/18: goal partially met: supine to sit with Jane x 1, continue goal  Short term goal 2: Pt will transfer sit <-> stand and bed>chair using 4WW with CGA x 1 -- 7/18: sit to/from stand with RW CGA/Jane  Short term goal 3: Pt will ambulate x 150 feet using 4WW with CGA/SBA x 1; 7/17 goal met 225 ft with CGA  Short term goal 4: By 7/17/20: Pt will tolerate 12-15 reps BLE exercise for strengthening, balance, and endurance; goal met 7/17 ongoing  Patient Goals   Patient goals : \"To get stronger and go to rehab so I can eventually go back home. \"    Plan    Plan  Times per week: 5-7x/week in acute care  Times per day: Daily  Specific instructions for Next Treatment: Progress ther ex and mobility as tolerated  Current Treatment Recommendations: Strengthening, Balance Training, Functional Mobility Training, Transfer Training, Gait Training, Safety Education & Training, Patient/Caregiver Education & Training, Equipment Evaluation, Education, & procurement, Home Exercise Program, Endurance Training, Stair training, ROM  Safety Devices  Type of devices: All fall risk precautions in place, Call light within reach, Nurse notified, Gait belt, Patient at risk for falls, Left in chair, Chair alarm in place  Restraints  Initially in place: No     Therapy Time   Individual Concurrent Group Co-treatment   Time In 0928         Time Out 0953         Minutes 25         Timed Code Treatment Minutes: 25 Minutes     If pt d/c prior to next tx session, this note to serve as d/c summary.     Thao Parra, PT, DPT

## 2020-07-18 NOTE — PROGRESS NOTES
Occupational Therapy  Facility/Department: [facility] B2 - ICU  Daily Treatment Note  NAME: Bishnu Fox  : 1933  MRN: [id number]    Date of Service: 2020    Discharge Recommendations:  Subacute/Skilled Nursing Facility       Assessment   Performance deficits / Impairments: Decreased functional mobility ; Decreased ADL status; Decreased balance;Decreased endurance;Decreased strength;Decreased safe awareness;Decreased high-level IADLs  Assessment: Todays treatment limited this date due pt's elevated HR. Pt however, motivated to perform OOB activity. Pt abl to complete bed mobility with MIN A. CGA for transfers, CGA with RW to manage short house hold distance. MIN A in order to manage toilet transfer. Cues for use of pillow in order to adhere to precautions. Cuing provided during all transfers in order to assure pt's safety during transfers. Pt will no longer require assist x 2. Pt is progressing well towards therapy goals. Activity Tolerance  Activity Tolerance: Treatment limited secondary to medical complications (free text)  Activity Tolerance: RN limited pt's participation d/t elevated HR. Pt in 130s while seated EOB, 130 during ambulation to/from bathroom  Safety Devices  Safety Devices in place: Yes  Type of devices: Left in chair;Gait belt;Call light within reach; Chair alarm in place;Nurse notified         Patient Diagnosis(es): The primary encounter diagnosis was Chest pain, unspecified type. A diagnosis of Unstable angina (HCC) was also pertinent to this visit. has a past medical history of Arthritis, Bell's palsy, CAD (coronary artery disease), Gout, Hypertension, Kidney stone, Thyroid disease, and UTI (urinary tract infection). has a past surgical history that includes joint replacement (Left); Coronary angioplasty with stent (2019);  Coronary angioplasty with stent (2016); and Coronary artery bypass graft (N/A, 7/13/2020). Restrictions  Restrictions/Precautions  Restrictions/Precautions: Cardiac, General Precautions, Fall Risk  Position Activity Restriction  Sternal Precautions: No Pushing, No Pulling, 5# Lifting Restrictions  Other position/activity restrictions: Medium fall risk per nursing assessment, ambulate pt, up in chair for meals; IJ, telemetry with ICU monitoring  Subjective   General  Chart Reviewed: Yes  Patient assessed for rehabilitation services?: Yes  Family / Caregiver Present: No  Referring Practitioner: Dr. Lupis Collins 7/13/2020  Diagnosis: CAD, s/p CABG x2 7/13/2020  Subjective  Subjective: Pt agreeable to OT  General Comment  Comments: RN approved therapy  Vital Signs  Patient Currently in Pain: Denies   Orientation  Orientation  Overall Orientation Status: Within Functional Limits  Objective    ADL  LE Dressing: Maximum assistance(donning larry hose and socks BLEs)  Toileting: Stand by assistance        Balance  Sitting Balance: Supervision  Standing Balance: Contact guard assistance  Standing Balance  Time: 2 minutes  Activity: transfers, ambulation, standing on weight scale  Comment: use of RW  Functional Mobility  Functional - Mobility Device: Rolling Walker  Activity: To/from bathroom  Assist Level: Contact guard assistance  Functional Mobility Comments: vc's for safety  Toilet Transfers  Toilet - Technique: Ambulating  Equipment Used: Standard toilet  Toilet Transfer: Contact guard assistance  Bed mobility  Supine to Sit: Minimal assistance  Sit to Supine: (pt sitting in recliner)  Scooting: Minimal assistance  Transfers  Sit to stand: Contact guard assistance  Stand to sit: Minimal assistance(MIN A provided in order to safely descend d/t pt not bein able to use BUEs)                       Cognition  Overall Cognitive Status: Exceptions  Arousal/Alertness: Appropriate responses to stimuli  Following Commands: Follows multistep commands with increased time; Follows multistep commands consistently  Attention Span: Attends with cues to redirect  Memory: Decreased short term memory  Safety Judgement: Decreased awareness of need for assistance;Decreased awareness of need for safety  Problem Solving: Decreased awareness of errors  Insights: Decreased awareness of deficits  Initiation: Requires cues for some  Sequencing: Requires cues for some              Plan   Plan  Times per week: 4-5x's a week while in ICU    AM-PAC Score        AM-PAC Inpatient Daily Activity Raw Score: 17 (07/18/20 1058)  AM-PAC Inpatient ADL T-Scale Score : 37.26 (07/18/20 1058)  ADL Inpatient CMS 0-100% Score: 50.11 (07/18/20 1058)  ADL Inpatient CMS G-Code Modifier : CK (07/18/20 1058)    Goals  Short term goals  Time Frame for Short term goals: 1 week unless otherwise specified 7/21  Short term goal 1: Pt will complete LE Dressing with Jane-STG met 7/16  Short term goal 2: Pt will complete toilet transfers with CGA of 1 by 7/19-ongoing currenlty Jane  Short term goal 3: Pt will complete oral care at sink with CGA for standing balance--ongoing  Short term goal 4: Follow sternal precautions independently during ADLs and transfers-ongoing, needs additional instructions  Patient Goals   Patient goals : \"to get stronger and go to rehab so I can go home\"       Therapy Time   Individual Concurrent Group Co-treatment   Time In 0929         Time Out 0956         Minutes 27         Timed Code Treatment Minutes: Kobi Mack OT

## 2020-07-18 NOTE — CONSULTS
[address] REHAB PHASE I  CABG x2    Marlon Carreon   9/4/1933 07/18/20    Previous cardiac rehab notes: none previously documented      Activity limitations:  shortness of breath  fatigue    Patient and family's stated goal of care prior to discharge:  Patient goal(s): reduce shortness of breath  increase activity tolerance  complete cardiac rehabilitation program  ambulate independently without any assist device      History:  Past Medical History:   Diagnosis Date    Arthritis     Bell's palsy     CAD (coronary artery disease)     Gout     Hypertension     Kidney stone     Thyroid disease     UTI (urinary tract infection)          ACTIVITY TOLERANCE    Patient's baseline reported from RN prior to activity:  RPE: 6/20   RPD: 0.5/10   O2: 97% on RA   HR:  86   BP:  119/62       Patient's tolerance during activity:  Stage 3  Ambulate:   Patient assistance level: 1 x assist   Assist Devices: rolling walker       RPE: 14/20   RPD: 3/10   Rest breaks 4 standing rest breaks   Distance 35+35+35+35   HR 93   O2 92% on RA      Time: 40 Minutes    Discussed with RN to see patient. Per RN-okay to work with pt. RN assisted pt from sitting to standing position from chair. Pt ambulated in hallway with 1x assist and rolling walker assistive device. Assisted pt back to chair and placed phone and call light within reach. Pt has no needs at this time. Touched base with RN after pt's session, discussed pt toleration. Will continue to follow up during the duration of the CR order.      MOSES Murillo 07/18/20 12:00 PM    Exercise Physiologist    Phone: [phone]

## 2020-07-18 NOTE — PROGRESS NOTES
Progress Note    S/P Urgent CABG X 2, with pedicled LIMA to LAD, single Greater Saphenous VG to OM, EDWINA obliteration with 45 mm Atricure LA Clip, SGC, CPB, EVH Right Greater Saphenous vein, ELISEO, Epiaortic ultrasound, Doppler verification of grafts, Bilateral 5 level intercostal nerve block(Exparel), Platelet gel application. 7/13/20    Vital Signs:                                                 BP (!) 105/54   Pulse 128   Temp 98.9 °F (37.2 °C) (Oral)   Resp 16   Ht 5' 9\" (1.753 m)   Wt 203 lb 8 oz (92.3 kg)   SpO2 93%   BMI 30.05 kg/m²  O2 Flow Rate (L/min): 0 L/min   SR  Admission Weight: 202 lb (91.6 kg)      Drips: dilt    I/O:      Intake/Output Summary (Last 24 hours) at 7/18/2020 1007  Last data filed at 7/18/2020 0917  Gross per 24 hour   Intake 291.33 ml   Output 1075 ml   Net -783.67 ml     CV: reg, wound c/d/i  Pulm: decreased  Abd: soft  Ext: warm, trace edema    Data Review:  CBC:   Recent Labs     07/16/20  0644 07/17/20  0550 07/18/20  0402   WBC 12.9* 10.8 10.5   HGB 8.8* 8.9* 8.8*   HCT 26.2* 25.3* 25.5*   MCV 93.0 92.9 92.7   * 145 162     BMP:   Recent Labs     07/16/20  0644 07/17/20  0550 07/18/20  0402    135* 137   K 4.0 3.5 3.6    101 100   CO2 26 27 29   BUN 29* 30* 23*   CREATININE 1.0 0.8 0.8   CALCIUM 8.6 8.3 8.5   MG 2.00 1.90 1.90     Cardiac Enzymes: No results for input(s): CKTOTAL, CKMB, CKMBINDEX, TROPONINI in the last 72 hours. PT/INR: No results for input(s): PROTIME, INR in the last 72 hours. APTT: No results for input(s): APTT in the last 72 hours. Assessment/Plan:  CV - AF 7/15. Amio, dilt gtt for rate control - switch to po   - BB, aldactone   - statin  pulm - off O2  Renal - Cr nl. At preop weight.   Heme - acute blood loss anemia   - ASA 81 for cad   - eliquis for AF  dispo - eventually ARU once AF issues settled    Suzie Hatfield MD  7/18/2020  10:07 AM

## 2020-07-19 VITALS
RESPIRATION RATE: 15 BRPM | BODY MASS INDEX: 29.89 KG/M2 | DIASTOLIC BLOOD PRESSURE: 66 MMHG | HEART RATE: 101 BPM | HEIGHT: 69 IN | WEIGHT: 201.8 LBS | TEMPERATURE: 98.8 F | OXYGEN SATURATION: 94 % | SYSTOLIC BLOOD PRESSURE: 127 MMHG

## 2020-07-19 LAB
ANION GAP SERPL CALCULATED.3IONS-SCNC: 9 MMOL/L (ref 3–16)
BUN BLDV-MCNC: 25 MG/DL (ref 7–20)
CALCIUM SERPL-MCNC: 8.8 MG/DL (ref 8.3–10.6)
CHLORIDE BLD-SCNC: 99 MMOL/L (ref 99–110)
CO2: 29 MMOL/L (ref 21–32)
CREAT SERPL-MCNC: 0.7 MG/DL (ref 0.8–1.3)
GFR AFRICAN AMERICAN: >60
GFR NON-AFRICAN AMERICAN: >60
GLUCOSE BLD-MCNC: 119 MG/DL (ref 70–99)
HCT VFR BLD CALC: 27.1 % (ref 40.5–52.5)
HEMOGLOBIN: 9.4 G/DL (ref 13.5–17.5)
MAGNESIUM: 2.1 MG/DL (ref 1.8–2.4)
MCH RBC QN AUTO: 32.2 PG (ref 26–34)
MCHC RBC AUTO-ENTMCNC: 34.6 G/DL (ref 31–36)
MCV RBC AUTO: 93.1 FL (ref 80–100)
PDW BLD-RTO: 15.1 % (ref 12.4–15.4)
PLATELET # BLD: 205 K/UL (ref 135–450)
PMV BLD AUTO: 8.8 FL (ref 5–10.5)
POTASSIUM SERPL-SCNC: 3.7 MMOL/L (ref 3.5–5.1)
RBC # BLD: 2.91 M/UL (ref 4.2–5.9)
SODIUM BLD-SCNC: 137 MMOL/L (ref 136–145)
WBC # BLD: 10.1 K/UL (ref 4–11)

## 2020-07-19 PROCEDURE — 6370000000 HC RX 637 (ALT 250 FOR IP): Performed by: THORACIC SURGERY (CARDIOTHORACIC VASCULAR SURGERY)

## 2020-07-19 PROCEDURE — 99024 POSTOP FOLLOW-UP VISIT: CPT | Performed by: THORACIC SURGERY (CARDIOTHORACIC VASCULAR SURGERY)

## 2020-07-19 PROCEDURE — 2580000003 HC RX 258: Performed by: THORACIC SURGERY (CARDIOTHORACIC VASCULAR SURGERY)

## 2020-07-19 PROCEDURE — 85027 COMPLETE CBC AUTOMATED: CPT

## 2020-07-19 PROCEDURE — 83735 ASSAY OF MAGNESIUM: CPT

## 2020-07-19 PROCEDURE — 97110 THERAPEUTIC EXERCISES: CPT

## 2020-07-19 PROCEDURE — 6360000002 HC RX W HCPCS: Performed by: THORACIC SURGERY (CARDIOTHORACIC VASCULAR SURGERY)

## 2020-07-19 PROCEDURE — 97116 GAIT TRAINING THERAPY: CPT

## 2020-07-19 PROCEDURE — 80048 BASIC METABOLIC PNL TOTAL CA: CPT

## 2020-07-19 PROCEDURE — 6370000000 HC RX 637 (ALT 250 FOR IP): Performed by: NURSE PRACTITIONER

## 2020-07-19 RX ORDER — AMIODARONE HYDROCHLORIDE 200 MG/1
200 TABLET ORAL 2 TIMES DAILY
Qty: 20 TABLET | Refills: 0 | Status: SHIPPED | OUTPATIENT
Start: 2020-07-23 | End: 2020-10-15 | Stop reason: ALTCHOICE

## 2020-07-19 RX ORDER — FAMOTIDINE 20 MG/1
20 TABLET, FILM COATED ORAL 2 TIMES DAILY
Qty: 42 TABLET | Refills: 0 | Status: SHIPPED | OUTPATIENT
Start: 2020-07-19 | End: 2020-07-28 | Stop reason: ALTCHOICE

## 2020-07-19 RX ORDER — METOPROLOL TARTRATE 50 MG/1
50 TABLET, FILM COATED ORAL 2 TIMES DAILY
Qty: 60 TABLET | Refills: 3 | Status: SHIPPED | OUTPATIENT
Start: 2020-07-19 | End: 2020-11-13

## 2020-07-19 RX ORDER — SPIRONOLACTONE 25 MG/1
25 TABLET ORAL DAILY
Qty: 30 TABLET | Refills: 3 | Status: SHIPPED | OUTPATIENT
Start: 2020-07-20 | End: 2020-07-28 | Stop reason: DRUGHIGH

## 2020-07-19 RX ORDER — OXYCODONE HYDROCHLORIDE 5 MG/1
5 TABLET ORAL EVERY 6 HOURS PRN
Qty: 28 TABLET | Refills: 0 | Status: SHIPPED | OUTPATIENT
Start: 2020-07-19 | End: 2020-07-26

## 2020-07-19 RX ADMIN — LEVOTHYROXINE SODIUM 50 MCG: 0.05 TABLET ORAL at 09:24

## 2020-07-19 RX ADMIN — POLYETHYLENE GLYCOL 3350 17 G: 17 POWDER, FOR SOLUTION ORAL at 09:23

## 2020-07-19 RX ADMIN — METOPROLOL TARTRATE 50 MG: 50 TABLET, FILM COATED ORAL at 09:23

## 2020-07-19 RX ADMIN — ALLOPURINOL 300 MG: 300 TABLET ORAL at 09:23

## 2020-07-19 RX ADMIN — SPIRONOLACTONE 25 MG: 25 TABLET ORAL at 09:24

## 2020-07-19 RX ADMIN — ASPIRIN 81 MG: 81 TABLET ORAL at 09:24

## 2020-07-19 RX ADMIN — BISACODYL 5 MG: 5 TABLET, COATED ORAL at 09:23

## 2020-07-19 RX ADMIN — APIXABAN 5 MG: 5 TABLET, FILM COATED ORAL at 09:23

## 2020-07-19 RX ADMIN — MAGNESIUM GLUCONATE 500 MG ORAL TABLET 400 MG: 500 TABLET ORAL at 09:23

## 2020-07-19 RX ADMIN — POTASSIUM CHLORIDE 10 MEQ: 10 TABLET, EXTENDED RELEASE ORAL at 09:24

## 2020-07-19 RX ADMIN — Medication 15 ML: at 09:27

## 2020-07-19 RX ADMIN — FUROSEMIDE 40 MG: 10 INJECTION, SOLUTION INTRAMUSCULAR; INTRAVENOUS at 09:24

## 2020-07-19 RX ADMIN — Medication 10 ML: at 09:24

## 2020-07-19 RX ADMIN — DILTIAZEM HYDROCHLORIDE 30 MG: 60 TABLET, FILM COATED ORAL at 09:24

## 2020-07-19 RX ADMIN — FAMOTIDINE 20 MG: 20 TABLET, FILM COATED ORAL at 09:24

## 2020-07-19 ASSESSMENT — PAIN SCALES - GENERAL
PAINLEVEL_OUTOF10: 5
PAINLEVEL_OUTOF10: 0

## 2020-07-19 ASSESSMENT — PAIN DESCRIPTION - PAIN TYPE: TYPE: ACUTE PAIN;SURGICAL PAIN

## 2020-07-19 ASSESSMENT — PAIN DESCRIPTION - LOCATION: LOCATION: CHEST;STERNUM

## 2020-07-19 NOTE — PLAN OF CARE
Problem: Falls - Risk of:  Goal: Will remain free from falls  Description: Will remain free from falls  Outcome: Ongoing  Note: Pt high fall risk. Instructed to use call light before getting out of bed. Call light within reach. Bed in low position. Bed alarm on. Will continue to monitor. Goal: Absence of physical injury  Description: Absence of physical injury  Outcome: Ongoing     Problem: Pain:  Goal: Pain level will decrease  Description: Pain level will decrease  Outcome: Ongoing  Goal: Control of acute pain  Description: Control of acute pain  Outcome: Ongoing  Goal: Control of chronic pain  Description: Control of chronic pain  Outcome: Ongoing     Problem: Preoperative Routine:  Goal: Will comply with preparation for surgery or procedure  Description: Will comply with preparation for surgery or procedure  Outcome: Ongoing     Problem: Skin Integrity:  Goal: Will show no infection signs and symptoms  Description: Will show no infection signs and symptoms  Outcome: Ongoing  Note: Patient skin kept clean and dry, cleansed if soiled. Patient turned every 2 hours if not able to turn self. No evidence of skin breakdown this shift, will continue to monitor.     Goal: Absence of new skin breakdown  Description: Absence of new skin breakdown  Outcome: Ongoing

## 2020-07-19 NOTE — PLAN OF CARE
[address] [place] [address] REHAB PHASE I  POST OP OHS Education      Open heart surgery post-operative education and information was provided to the patient and/or family. Discharge instructions following open heart surgery:    Explained supplies needed at home, activity instructions, incision care, diet/nutritional intake, medications, daily weights and temperature, smoking cessation, wearing KIRIT hose, using incentive spirometer, awareness of heart beating, and when to call the doctor. Reminded patient of follow up with doctor   Explained the activity log and walking protocol   Reviewed lifestyle changes        Outpatient cardiac rehab referral was completed and patient was educated on benefits of participation in outpatient cardiac rehabilitation (OPCR) and secondary prevention. Patient was informed of choices for local programs close to their home and were provided with contact information for these programs. . List located in \"Your care after heart surgery\" binder. Cardiac Rehab referral completed for Post Open Heart Surgery and provided with a brochure explaining Cardiac Rehab exercise and education program that begins 4-6 weeks post surgery, when released by surgeon. Contact numbers present on brochure. Re-enforced walking program, use of incentive spirometer, and deep breathing initiated by nursing staff in hospital to continue in home program. Thank you for this referral.     Pt, pt's wife, and pt's son   demonstrate understanding. No additional questions at this time.     Outpatient Cardiac Rehab Location [address] [place] [address] 07/19/20 4:00 PM    Exercise Physiologist    Phone: [phone]

## 2020-07-19 NOTE — CARE COORDINATION
CASE MANAGEMENT DISCHARGE SUMMARY      Discharge to: SNF @ [address] [place] [address] completed: 3 [place] Medicare stay met  Hospital Exemption Notification (HENS) completed: yes    New Durable Medical Equipment ordered/agency: per facility    Transportation:    Family/car: no   Medical Transport explained to pt/family. Pt/family voice no agency preference. Agency used: [employer] up time: 1700   Ambulance form completed: Yes    Confirmed discharge plan with:     Patient: yes per RN     Family, name and contact number: Family to bedside, RN to inform   Phone number for report to facility: [phone], name: Garima Barrera RN    Note: Discharging nurse to complete CORTNEY, reconcile AVS, and place final copy with patient's discharge packet. RN to ensure that written prescriptions for  Level II medications are sent with patient to the facility as per protocol.

## 2020-07-19 NOTE — FLOWSHEET NOTE
07/19/20 0741   Assessment   Charting Type Shift assessment   Neurological   Neuro (WDL) WDL   Level of Consciousness 0   Orientation Level Oriented X4   Cognition Appropriate judgement; Follows commands; Impulsive;Poor safety awareness;Poor attention/concentration   Language Clear   Size R Pupil (mm) 2   R Pupil Shape Round   R Pupil Reaction Brisk   Size L Pupil (mm) 2   L Pupil Shape Round   L Pupil Reaction Brisk   R Hand  Moderate   L Hand  Moderate   R Foot Dorsiflexion Moderate   L Foot Dorsiflexion Moderate   R Foot Plantar Flexion Moderate   L Foot Plantar Flexion Moderate   RUE Motor Response Responds to command   Sensation RUE Full sensation; No numbness; No pain; No tingling   LUE Motor Response Responds to command   Sensation LUE Full sensation; No numbness; No pain; No tingling   RLE Motor Response Responds to command   Sensation RLE Full sensation; No numbness; No pain; No tingling   LLE Motor Response Responds to command   Sensation LLE Full sensation; No numbness; No pain; No tingling   Gag Present   [place] Coma Scale   Eye Opening 4   Best Verbal Response 5   Best Motor Response 6   [place] Coma Scale Score 15   HEENT   HEENT (WDL) X   Right Eye Impaired vision; Intact   Left Eye Impaired vision; Intact   Throat Intact   Neck Symmetrical;Trachea midline   Tongue Pink & moist   Voice Normal   Mucous Membrane Moist;Pink; Intact   Teeth Dentures upper;Dentures lower   Respiratory   Respiratory (WDL) X   Respiratory Pattern Regular   Respiratory Depth Normal   Respiratory Quality/Effort Unlabored;Dyspnea with exertion   Chest Assessment Chest expansion symmetrical;Trachea midline   L Breath Sounds Clear;Diminished   R Breath Sounds Clear;Diminished   Cardiac   Cardiac (WDL) X  (Post CABG x2)   Cardiac Regularity Regular   Heart Sounds S1, S2   Cardiac Rhythm NSR   Rhythm Interpretation   Pulse 101   Cardiac Monitor   Telemetry Monitor On Yes   Telemetry Audible Yes   Telemetry Alarms Set Yes   Telemetry Box Number    Gastrointestinal   Abdominal (WDL) WDL   Abdomen Inspection Rounded; Soft   Tenderness Soft; No guarding;Nontender; No rebound   RUQ Bowel Sounds Active   LUQ Bowel Sounds Active   RLQ Bowel Sounds Active   LLQ Bowel Sounds Active   Passing Flatus Y   Peripheral Vascular   Peripheral Vascular (WDL) X   Edema Right lower extremity; Left lower extremity   RLE Edema Non-pitting;+1   LLE Edema Non-pitting;+1   RUE Neurovascular Assessment   R Radial Pulse +2   Temperature Warm   LUE Neurovascular Assessment   L Radial Pulse +2   Temperature Warm   RLE Neurovascular Assessment   Temperature Warm   R Post Tibial Pulse +1   R Pedal Pulse +2   LLE Neurovascular Assessment   Temperature Warm   L Post Tibial Pulse +1   L Pedal Pulse +2   Skin Color/Condition   Skin Color/Condition (WDL) WDL   Skin Color Appropriate for ethnicity   Skin Condition/Temp Dry; Warm   Skin Integrity   Skin Integrity (WDL) X  (Surgical - See LDA)   Skin Integrity Other (Comment)  (Surgical incision - see LDA)   Location Midsternal, RLE   Multiple Skin Integrity Sites Yes   Skin Integrity Site 2   Skin Integrity Location 2 Redness  (Blanchable, intact)   Location 2 Coccyx   Preventative Dressing No   Musculoskeletal   Musculoskeletal (WDL) X  (sternal precautions)   RUE Limited movement  (sternal precautions)   LUE Limited movement  (sternal precautions)   RL Extremity Weakness   LL Extremity Weakness   Genitourinary   Genitourinary (WDL) WDL   Flank Tenderness No   Suprapubic Tenderness No   Dysuria No   Urine Assessment   Bladder Scan Volume (mL) 200 mL   Incontinence No   Urine Color Yellow/straw   Urine Appearance Clear   Anus/Rectum   Anus/Rectum (WDL) WDL   Incision 07/13/20 Sternum   Date First Assessed: 07/13/20   Primary Wound Type: Incision  Location: Sternum   Wound Assessment Clean;Dry; Intact;Edges well approximated   Zina-wound Assessment Dry;Clean; Intact   Closure Surgical glue   Drainage Amount None   Dressing/Treatment Open to air   Dressing Status Clean;Dry; Intact   Incision 07/13/20 Leg Right   Date First Assessed: 07/13/20   Primary Wound Type: Incision  Location: Leg  Wound Location Orientation: Right   Wound Assessment Clean;Dry; Intact;Edges well approximated   Zina-wound Assessment Clean;Dry; Intact   Closure Surgical glue; Open to air   Drainage Amount None   Dressing/Treatment Open to air   Dressing Status Clean;Dry; Intact   Psychosocial   Psychosocial (WDL) WDL   Patient Behaviors Calm; Cooperative

## 2020-07-19 NOTE — PLAN OF CARE
[address] REHAB PHASE I  CABG x2    Vincent Norris   9/4/1933 07/19/20    Previous cardiac rehab notes: ambulate 50 feet increments    Activity limitations:  shortness of breath  fatigue    Patient and family's stated goal of care prior to discharge:  Patient goal(s): reduce shortness of breath  increase activity tolerance  complete cardiac rehabilitation program  ambulate independently without any assist device      History:  Past Medical History:   Diagnosis Date    Arthritis     Bell's palsy     CAD (coronary artery disease)     Gout     Hypertension     Kidney stone     Thyroid disease     UTI (urinary tract infection)          ACTIVITY TOLERANCE    Patient's baseline reported from RN prior to activity:  RPE: 6/20   RPD: 0.5/10   O2: 96% on RA   HR:  84       Patient's tolerance during activity:  Stage 3  Ambulate:   Patient assistance level: 1 x assist   Assist Devices: rolling walker       RPE: 14/20   RPD: 4/10   Rest breaks 4 standing rest breaks   Distance 50+50+50+50      O2 93% on RA      Time:  40 Minutes    Discussed with RN to see patient. Per RN-okay to work with pt. RN assisted pt from sitting to standing position from chair. Pt ambulated in hallway with 1x assist and rolling walker assistive device. Assisted pt back to chair and placed phone and call light within reach. Pt has no needs at this time. Touched base with RN after pt's session, discussed pt toleration. Will continue to follow up during the duration of the CR order.      MOSES Murillo 07/19/20 12:30 PM    Exercise Physiologist    Phone: [phone]

## 2020-07-19 NOTE — PROGRESS NOTES
Progress Note    S/P Urgent CABG X 2, with pedicled LIMA to LAD, single Greater Saphenous VG to OM, EDWINA obliteration with 45 mm Atricure LA Clip, SGC, CPB, EVH Right Greater Saphenous vein, ELISEO, Epiaortic ultrasound, Doppler verification of grafts, Bilateral 5 level intercostal nerve block(Exparel), Platelet gel application. 7/13/20    Vital Signs:                                                 /66   Pulse 101   Temp 98.8 °F (37.1 °C) (Oral)   Resp 15   Ht 5' 9\" (1.753 m)   Wt 201 lb 12.8 oz (91.5 kg)   SpO2 94%   BMI 29.80 kg/m²  O2 Flow Rate (L/min): 0 L/min   SR  Admission Weight: 202 lb (91.6 kg)      I/O:      Intake/Output Summary (Last 24 hours) at 7/19/2020 0930  Last data filed at 7/18/2020 1900  Gross per 24 hour   Intake 470 ml   Output 900 ml   Net -430 ml     CV: reg, wound c/d/i  Pulm: decreased  Abd: soft  Ext: warm, trace edema    Data Review:  CBC:   Recent Labs     07/17/20  0550 07/18/20  0402 07/19/20  0440   WBC 10.8 10.5 10.1   HGB 8.9* 8.8* 9.4*   HCT 25.3* 25.5* 27.1*   MCV 92.9 92.7 93.1    162 205     BMP:   Recent Labs     07/17/20  0550 07/18/20  0402 07/18/20  1340 07/19/20  0440   * 137  --  137   K 3.5 3.6  --  3.7    100  --  99   CO2 27 29  --  29   BUN 30* 23*  --  25*   CREATININE 0.8 0.8  --  0.7*   CALCIUM 8.3 8.5  --  8.8   MG 1.90 1.90 2.40 2.10     Cardiac Enzymes: No results for input(s): CKTOTAL, CKMB, CKMBINDEX, TROPONINI in the last 72 hours. PT/INR: No results for input(s): PROTIME, INR in the last 72 hours. APTT: No results for input(s): APTT in the last 72 hours. Assessment/Plan:  CV - AF 7/15. Amio, dilt po   - BB, aldactone   - statin  pulm - off O2  Renal - Cr nl. At preop weight.   Heme - acute blood loss anemia   - ASA 81 for cad   - eliquis for AF  dispo - ready for tx to rehab    Tanya Turner MD  7/19/2020  9:30 AM

## 2020-07-19 NOTE — PROGRESS NOTES
Writer spoke with Dr. Baljinder Hutchinson regarding pt's HR, as he jumped up to 140 a couple times and has been averaging in the 110s. Pt has been in sinus tach. Dr. Baljinder Hutchinson is aware, no new orders.

## 2020-07-19 NOTE — PROGRESS NOTES
Two copies of AVS placed in chart. Amiodarone and oxycodone scripts placed in security bag and placed in chart.

## 2020-07-19 NOTE — PLAN OF CARE
[address] REHAB PHASE I  CABG x2    Bishnu Fox   9/4/1933 07/19/20    Previous cardiac rehab notes: ambulate 35 feet increments    Activity limitations:  shortness of breath  fatigue    Patient and family's stated goal of care prior to discharge:  Patient goal(s): reduce shortness of breath  increase activity tolerance  complete cardiac rehabilitation program  ambulate independently without any assist device      History:  Past Medical History:   Diagnosis Date    Arthritis     Bell's palsy     CAD (coronary artery disease)     Gout     Hypertension     Kidney stone     Thyroid disease     UTI (urinary tract infection)          ACTIVITY TOLERANCE    Patient's baseline reported from RN prior to activity:  RPE: 6/20   RPD: 1/10   O2: 94% on RA   HR:  78   BP:  127/66       Patient's tolerance during activity:  Stage 3  Ambulate:   Patient assistance level: 1 x assist   Assist Devices: rolling walker       RPE: 14/20   RPD: 4/10   Rest breaks 4 standing rest breaks   Distance 50+50+50+50   -notified RN   O2 92% on RA      Time:  40 Minutes    Discussed with RN to see patient. Per RN-okay to work with pt. RN assisted pt from sitting to standing position from chair. Pt ambulated in hallway with 1x assist and rolling walker assistive device. Assisted pt back to chair and placed phone and call light within reach. Pt has no needs at this time. Touched base with RN after pt's session, discussed pt toleration. Will continue to follow up during the duration of the CR order.      MOSES Murillo 07/19/20 7:15 AM    Exercise Physiologist    Phone: [phone]

## 2020-07-19 NOTE — PROGRESS NOTES
Report called and given to Benita Salazar RN at [facility]. Medications discussed, no questions or concerns.

## 2020-07-19 NOTE — PROGRESS NOTES
Physical Therapy  Facility/Department: [facility] B2 - ICU  Daily Treatment Note  NAME: Julio Haas  : 1933  MRN: [id number]    Date of Service: 2020    Discharge Recommendations:  Subacute/Skilled Nursing Facility   PT Equipment Recommendations  Equipment Needed: No  Other: Defer to next level of care    Assessment   Body structures, Functions, Activity limitations: Decreased functional mobility ; Decreased strength;Decreased endurance;Decreased balance; Increased pain  Assessment: Pt pleasant and agreeable to PT tx seen for gait training to decrease gait deviations and improve safety and performance within the home and community and therapeutic exerises to increase strength and improve peformance with functional mobility. Pt demonstrates improved performance with functional mobility and gait, pt completes t/f with CGA and ambulates community distances with RW and CGA/SBA without LOB. Pt does require intial cues to maintain sternal preautions but then is able to maintain throughout session. Pt requires intermittent rest breaks with activity to recover from fatigue. Pt will benefit from continued skilled PT in acute care setting to address above deficits. Treatment Diagnosis: Decreased endurance and (I) with mobility  Specific instructions for Next Treatment: Progress ther ex and mobility as tolerated  Prognosis: Good  Decision Making: Medium Complexity  PT Education: Goals;PT Role;Plan of Care;Precautions;Transfer Training;Energy Conservation; Functional Mobility Training;Disease Specific Education;Gait Training;General Safety;Weight-bearing Education;Home Exercise Program  Patient Education: Pt verbalizes understanding. Barriers to Learning: Impaired memory at times, distractable  REQUIRES PT FOLLOW UP: Yes  Activity Tolerance  Activity Tolerance: Patient Tolerated treatment well;Patient limited by endurance; Patient limited by fatigue  Activity Tolerance:  at rest up to 120 with gait activities.  Fatigue and SOB with gait training with SpO2 >90% on RA. Intermittent seated rest breaks to recover     Patient Diagnosis(es): The primary encounter diagnosis was Chest pain, unspecified type. Diagnoses of Unstable angina (HCC), S/P CABG x 2, and Coronary artery disease involving native coronary artery of native heart with unstable angina pectoris [facility]) were also pertinent to this visit. has a past medical history of Arthritis, Bell's palsy, CAD (coronary artery disease), Gout, Hypertension, Kidney stone, Thyroid disease, and UTI (urinary tract infection). has a past surgical history that includes joint replacement (Left); Coronary angioplasty with stent (07/25/2019); Coronary angioplasty with stent (12/22/2016); and Coronary artery bypass graft (N/A, 7/13/2020). Restrictions  Restrictions/Precautions  Restrictions/Precautions: Cardiac, General Precautions, Fall Risk  Position Activity Restriction  Sternal Precautions: No Pushing, No Pulling, 5# Lifting Restrictions  Other position/activity restrictions: Medium fall risk per nursing assessment, ambulate pt, up in chair for meals; IJ, telemetry with ICU monitoring  Subjective   General  Chart Reviewed: Yes  Response To Previous Treatment: Patient with no complaints from previous session. Family / Caregiver Present: No  Referring Practitioner: Carey Kuhn MD  Subjective  Subjective: Pt seated in recliner on approach, pleasant and agreeable to PT tx  General Comment  Comments: RN cleared pt for session  Pain Screening  Patient Currently in Pain: Yes  Pain Assessment  Pain Assessment: 0-10  Pain Level: 5  Pain Type: Acute pain;Surgical pain  Pain Location: Chest;Sternum  Vital Signs  Patient Currently in Pain: Yes       Orientation  Orientation  Overall Orientation Status: Within Functional Limits  Cognition   Cognition  Overall Cognitive Status: Exceptions  Arousal/Alertness: Appropriate responses to stimuli  Following Commands:  Follows multistep commands with Goals  Short term goals  Time Frame for Short term goals: 1 week, 7/21/20 (unless otherwise specified)  Short term goal 1: Pt will transfer supine <-> sit with modA x 1 -- 7/18: goal partially met: supine to sit with Jane x 1, continue goal  Short term goal 2: Pt will transfer sit <-> stand and bed>chair using 4WW with CGA x 1 -- GOAL MET 7/19: CGA with RW, cues for sternal precautions  Short term goal 3: Pt will ambulate x 150 feet using 4WW with CGA/SBA x 1; 7/17 goal met 225 ft with CGA  Short term goal 4: By 7/17/20: Pt will tolerate 12-15 reps BLE exercise for strengthening, balance, and endurance; goal met 7/17 ongoing  Patient Goals   Patient goals : \"To get stronger and go to rehab so I can eventually go back home. \"    Plan    Plan  Times per week: 5-7x/week in acute care  Times per day: Daily  Specific instructions for Next Treatment: Progress ther ex and mobility as tolerated  Current Treatment Recommendations: Strengthening, Balance Training, Functional Mobility Training, Transfer Training, Gait Training, Safety Education & Training, Patient/Caregiver Education & Training, Equipment Evaluation, Education, & procurement, Home Exercise Program, Endurance Training, Stair training, ROM  Safety Devices  Type of devices: All fall risk precautions in place, Call light within reach, Nurse notified, Gait belt, Patient at risk for falls, Left in chair, Chair alarm in place  Restraints  Initially in place: No     Therapy Time   Individual Concurrent Group Co-treatment   Time In 0950         Time Out 1030         Minutes 40         Timed Code Treatment Minutes: 40 Minutes     If pt d/c prior to next tx session this note to serve as d/c summary.     Varinder Atwood, PT, DPT

## 2020-07-20 ENCOUNTER — TELEPHONE (OUTPATIENT)
Dept: CARDIOTHORACIC SURGERY | Age: 85
End: 2020-07-20

## 2020-07-20 NOTE — DISCHARGE SUMMARY
Cardiac, Vascular & Thoracic Surgery  Discharge Summary    Patient:  Gale Garcia 9/4/1933 [id number]   Admission Date:  7/8/2020  4:23 PM  Discharge Date:  7/19/2020 MAP    Principle Diagnosis:  CAD, unstable angina, NSTEMI, severe left main trunk stenosis, HTN, dyslipidemia. Secondary Diagnosis:  Active Problems:    Coronary artery disease involving native coronary artery of native heart with unstable angina pectoris (HCC)    Unstable angina (HCC)    Chest pain    NSTEMI (non-ST elevated myocardial infarction) ([address]    Dyslipidemia    Left main coronary artery disease  Resolved Problems:    * No resolved hospital problems. *    Cardiac Cath: 7/9/20 with Dr. Bassam Ventura  <10mm HG   LV FUNCTION EF 60 %   WALL MOTION  normal   MITRAL REGURGITATION  mild       CORONARY ARTERIES     LM  Proximal-mid 10 to 20% stenosis with distal 90% stenosis that   extends into both the LAD and circumflex, calcified vessel.        LAD  There is a stent in the ostium of the LAD that extends to   the proximal and mid segments, there is 99% ostial/proximal   stenosis with mid 60% stenosis and distal 60% stenosis. D1, D2, D3 have diffuse disease with D1 having proximal 60%   stenosis and D2 and D3 have ostial 90% stenoses, these are small   diagonal branches.        LCX Ostial/proximal 90% stenosis, calcified vessel, proximal 30   to 40% stenosis followed by mid-distal 60 to 70% stenosis that   extends into the first obtuse marginal.  There is a bifurcation   point distally in this vessel and the lower branch has ostial 70   to 80% stenosis.      RCA  Dominant, calcified, ostial/proximal 40 to 50% stenosis.     Mid-distal 30% stenosis.  Vessel is calcified, PDA and PLV have   [id number]-hrr-aiwtza 20 to 30% stenoses.      Procedure: 7/13/20 Urgent CABG X 2, with pedicled LIMA to LAD, single Greater Saphenous VG to OM, EDWINA obliteration with 45 mm Atricure LA Clip, SGC, CPB, daily             amiodarone (CORDARONE) 200 MG tablet  Take 1 tablet by mouth 2 times daily 200mg 2 times daily 7/19-7/22  Then 200mg once daily for 2 weeks  Then stop             apixaban (ELIQUIS) 5 MG TABS tablet  Take 1 tablet by mouth 2 times daily             aspirin 81 MG tablet  Take 81 mg by mouth daily             atorvastatin (LIPITOR) 40 MG tablet  Take 1 tablet by mouth daily             bisacodyl (DULCOLAX) 5 MG EC tablet  Take 1 tablet by mouth daily             dilTIAZem (CARDIZEM) 30 MG tablet  Take 1 tablet by mouth every 12 hours             famotidine (PEPCID) 20 MG tablet  Take 1 tablet by mouth 2 times daily             levothyroxine (SYNTHROID) 50 MCG tablet  Take 50 mcg by mouth Daily             metoprolol tartrate (LOPRESSOR) 50 MG tablet  Take 1 tablet by mouth 2 times daily             oxyCODONE (ROXICODONE) 5 MG immediate release tablet  Take 1 tablet by mouth every 6 hours as needed for Pain for up to 7 days. Respiratory Therapy Supplies (SPIROMETER) KIT  1 Units by Does not apply route daily             spironolactone (ALDACTONE) 25 MG tablet  Take 1 tablet by mouth daily                 Patient Instructions: Activity: DO NOT LIFT, PUSH, OR PULL ANYTHING OVER 5 POUNDS FOR 6 WEEK from the day of surgery  Diet:  Regular diet   Wound Care:  [address] YOUR INCISIONS DAILY WITH A CLEAN WASHCLOTH AND ANTIBACTERIAL SOAP. Do not wash your incisions after you have cleansed other parts of your body    Follow up with Cardiothoracic Surgeon, Dr. Radha Roldan on 7/28. Office will call to schedule appointment.   Follow up with Cardiologist, Dr. Linda Love, on 8/12/20 at 9:30 Rajendra Pearson MD  7/24/2020  6:47 AM

## 2020-07-20 NOTE — TELEPHONE ENCOUNTER
[phone]/fax [phone]  Spoke w Kayleigh Samuel LM w/ DON advised of post op appt w/dr vail July 28 at 10:15 am  Advised of location. Requested call back to confirm; fax sent to [facility] [place] w/all appt information as well; FAX receipt in Epic scan bin.

## 2020-07-28 ENCOUNTER — OFFICE VISIT (OUTPATIENT)
Dept: CARDIOTHORACIC SURGERY | Age: 85
End: 2020-07-28

## 2020-07-28 VITALS
BODY MASS INDEX: 28.44 KG/M2 | HEART RATE: 80 BPM | HEIGHT: 69 IN | TEMPERATURE: 97.3 F | OXYGEN SATURATION: 97 % | SYSTOLIC BLOOD PRESSURE: 110 MMHG | WEIGHT: 192 LBS | DIASTOLIC BLOOD PRESSURE: 70 MMHG

## 2020-07-28 PROCEDURE — 99024 POSTOP FOLLOW-UP VISIT: CPT | Performed by: THORACIC SURGERY (CARDIOTHORACIC VASCULAR SURGERY)

## 2020-07-28 RX ORDER — SPIRONOLACTONE 25 MG/1
25 TABLET ORAL DAILY
Qty: 30 TABLET | Refills: 0 | Status: SHIPPED | OUTPATIENT
Start: 2020-07-28 | End: 2020-08-19

## 2020-07-28 RX ORDER — TRAMADOL HYDROCHLORIDE 50 MG/1
50 TABLET ORAL EVERY 6 HOURS PRN
Qty: 28 TABLET | Refills: 0 | Status: SHIPPED | OUTPATIENT
Start: 2020-07-28 | End: 2020-08-04

## 2020-07-28 NOTE — PROGRESS NOTES
Progress Note    CC:  Postoperative follow-up    S/P    CABG surgery. Subjective:    He feels good aside from some occasional dizziness which may be related to his relatively low blood pressure. He just got home from [facility] Sunday. He is having some difficulty sleeping at night and Kayleigh send him home without pain medications. His activity levels are appropriate for this point in his recovery. Vital Signs:                                                 /70 (Site: Right Upper Arm)   Pulse 80   Temp 97.3 °F (36.3 °C) (Infrared)   Ht 5' 9\" (1.753 m)   Wt 192 lb (87.1 kg)   SpO2 97%   BMI 28.35 kg/m²        CV:   Regular rate and rhythm with no rubs or murmurs. Pulm: Clear lung fields with no rales or rhonchi. Incisions:    Sternal incision is clean, dry and intact. Sternum is stable. Abd:  Soft  Ext: Leg incision is clean, dry and intact. No peripheral edema. Rhythm strip shows normal sinus rhythm. Assessment/Plan:  Overall doing very well post CABG surgery. I discontinued his Cardizem which was for atrial fib postop and his Flomax which she has never been on both of which can cause relatively lower blood pressure. We discussed secondary risk prevention for cardiovascular disease. I gave the patient a copy of our protocol for the secondary risk prevention of cardiovascular disease. The patient may increase activities as he feels comfortable doing so. Follow-up with his PCP and Dr. Nicole Sang as prescribed. Follow-up with me in 1 month. I gave him a prescription for tramadol.  7 days worth.     Rigoberto Bridges MD  7/28/2020  10:25 AM

## 2020-07-28 NOTE — LETTER
07/28/20      Charisma Urbano M.D., [facility] [place], [address] Cardi[facility] Thoracic Surgeons  [address]  [address] [place] [address]        RE:    Jasper Veronica,   9/4/1933    Dear Sharmin Aldridge  [facility]  [address] [facility], [address] [facility],    Jasper Martin was seen today for routine follow-up after his recent CABG surgery. Attached is the postop note.         Sincerely,     Lisa Chong MD    CC: Chrystal Shah MD

## 2020-08-11 PROBLEM — Z95.1 S/P 2-VESSEL CORONARY ARTERY BYPASS: Status: ACTIVE | Noted: 2020-08-11

## 2020-08-11 NOTE — PROGRESS NOTES
[facility]   CARDIAC EVALUATION NOTE  [phone]      PCP:  Macey Dupree MD    Reason for Consultation/Chief Complaint:  Follow up with CAD    Subjective   History of Present Illness:  Lorinda Skiff is a 80 y.o. patient with a history of CAD, S/P CABG, HTN and Hchol who presents with CP. He is a former pt of my previous practice. Pt has cv hx that dates back to 12/2016 admit to [facility] w/ unstable angina, abnl stress test. He was seen at [facility] and had cath w/ unsuccessful attempt at [address] [place] [address] (balloon would not cross). He was referred to [facility] and he had LAD PCI w/ 2 vin after orbital atherectomy was performed with diamondback catheter. Since then he feels better, no cp, but notes sob/noe, w/ minimal exertion. Htn, hchol, cad/ashd, stable on prescribed meds but ashd may have progressed, sob may be anginal equivalent, prev sx included ht burn as well for angina. On 07/11/19 he reports he was in [place] and started getting CP in 03/2019. He went to the hospital in [facility]. He had a stress test 03/15/19 which showed mild ischemia involving a very small region of apex. Mild fixed defect involving a small region of posterior basal wall. The results are not available at this time. He was informed his testing came back unremarkable. He reports the CP occurs with rest and with activity. He reports the episodes are similar to his symptoms in 2016 but are worsening. He reports the pain occasionally wakes him up. He has occasional BLE edema but takes dyazide. His cardiac cath from 07/16/19 showed severe LAD disease. Referred for consideration of CABG vs high risk PCI of LM/LAD. His cardiac cath from 07/25/19 resulted in a temp TV pacer insertion and removal. Insertion of short term external heart assist system into heart, intraoperative, percutaneous approach. Assistance with cardiac output using impeller pump, continuous. Rotational atherectomy (rotablator) of LAD.  High risk PCI of LM/LAD with 3 drug eluting stents. He was admitted -20 with CP for several weeks. His cardiac cath from 20 showed severe left main/LAD/circumflex disease. Refer for CABG. His echo from 20 showed his EF was 55% with mild concentric hypertrophy. He underwent CABGx2 on 20 with EDWINA obliteration with LA clip. Consent:  He and/or health care decision maker is aware that that he may receive a bill for this telephone service, depending on his insurance coverage, and has provided verbal consent to proceed: Yes      Documentation:  I communicated with the patient and/or health care decision maker about medications and symptoms. Details of this discussion including any medical advice provided: see below. I affirm this is a Patient Initiated Episode with an Established Patient who has not had a related appointment within my department in the past 7 days or scheduled within the next 24 hours. Total Time: minutes: 5-10 minutes    Note: not billable if this call serves to triage the patient into an appointment for the relevant concern    2020    TELEHEALTH EVALUATION -- Audio/Visual (During OWBSD-84 public health emergency)      Vincent Norris (:  1933) has requested an audio/video evaluation for the following concern(s): CAD S/P CABG. Today he reports he feels well overall. His home healthcare nurse states the CABG incision site is healing well. His leg is healing for vessel harvesting. He denies CP, SOB, BLE edema, dizziness or syncope. He uses a rollator walker. He has had a slight cough which is improving. Past Medical History:   has a past medical history of Arthritis, Bell's palsy, CAD (coronary artery disease), Gout, Hypertension, Kidney stone, Thyroid disease, and UTI (urinary tract infection).      From care everywhere:    Past Medical History:   Diagnosis Date    Anxiety    History of kidney stones    Hypothyroid    Tremors of nervous system    Unspecified essential hypertension not apply route daily 19  Yes Michela Steele MD   levothyroxine (SYNTHROID) 50 MCG tablet Take 50 mcg by mouth Daily   Yes Historical Provider, MD   allopurinol (ZYLOPRIM) 300 MG tablet Take 300 mg by mouth daily   Yes Historical Provider, MD   aspirin 81 MG tablet Take 81 mg by mouth daily   Yes Historical Provider, MD   spironolactone (ALDACTONE) 25 MG tablet Take 1 tablet by mouth daily  Patient not taking: Reported on 2020   Hans Ivy MD   amiodarone (CORDARONE) 200 MG tablet Take 1 tablet by mouth 2 times daily 200mg 2 times daily -  Then 200mg once daily for 2 weeks  Then stop  Patient not taking: Reported on 2020   Ruthann Councilman, MD          Allergies:  Patient has no known allergies. Review of Systems:   A 14 point review of symptoms completed. Pertinent positives identified in the HPI, all other review of symptoms negative as below. Objective   PHYSICAL EXAM:    There were no vitals filed for this visit.                Labs   CBC:   Lab Results   Component Value Date    WBC 10.1 2020    RBC 2.91 2020    HGB 9.4 2020    HCT 27.1 2020    MCV 93.1 2020    RDW 15.1 2020     2020     CMP:  Lab Results   Component Value Date     2020    K 3.7 2020    K 3.3 2020    CL 99 2020    CO2 29 2020    BUN 25 2020    CREATININE 0.7 2020    GFRAA >60 2020    AGRATIO 1.3 2020    LABGLOM >60 2020    GLUCOSE 119 2020    PROT 7.0 2020    PROT 8.1 10/11/2019    CALCIUM 8.8 2020    BILITOT 0.7 2020    ALKPHOS 58 2020    AST 38 2020    ALT 43 2020     PT/INR:  No results found for: PTINR  HgBA1c:  Lab Results   Component Value Date    LABA1C 5.6 2020     Lab Results   Component Value Date    TROPONINI 0.04 (H) 2020         Cardiac Data     Last EK19  SR HR 69    2019    nsr baseline artifact Today stable ekg nsr no ischemic changes     Echo: 08/31/17    Findings:     ~Left Ventricle: Normal LV size and function, EF is 60-65 percent, normal wall thickness, normal wall motion, abnormal relaxation    ~Right Ventricle: Normal size and function    ~Left Atrium: Mildly enlarged    ~Right Atrium: Normal size    ~Mitral Valve: mildly thickened and calcified, trivial regurgitation    ~Aortic Valve: Sclerotic, normal mobility, trivial regurgitation    ~Tricuspid Valve: Appears normal, trivial regurgitation    ~Pulmonic Valve: Appears normal, trivial regurgitation    ~Great Vessels: Aortic root and IVC size are normal    ~Pericardium:No significant effusion      Conclusions:    Normal LV size and function, EF is 60-65 percent, abnormal relaxation      Echo 07/11/20   Left ventricular systolic function is normal with a visually estimated   ejection fraction of 55%. The left ventricle is normal in size with mild   concentric hypertrophy. No regional wall motion abnormalities are noted. Diastolic filling parameters suggests impaired left ventricular relaxation. Stress Test: 09/01/17  Final Conclusions/Summary     Stress ECG Impressions:  No significant ST changes during vasodilator infusion    Summary:  Normal myocardial perfusion imaging stress test EF 63 percent No regional wall motion abnormality     Stress test: 03/2019    Cath: 12/22/16  FINDINGS         CORONARY ARTERIES    Please see dr guerra's report regarding coronary anatomy        PERCUTANEOUS INTERVENTION DESCRIPTION     Bivalirudin was used for anticoagulation.  A 6fr VL3.5 guide was   used to intubate the LM.  A choice floppy wire was used to cross   the lesion.  A boston VisualOn  catheter/balloon was used to   cross the lesion.  Choice floppy wire was exchanged for csi wire   in anticipation of atherectomy.   catheter was removed.    CSI atherectomy of LAD was performed with 1.25mm crown.  Lesion   was then stented with boston sci synergy 2.01o25fn BRET and boston   tvCompass synergy 3x8mm BRET.   Stents were implanted to avoid jailing of   D2.  CSI wire was removed and a choice floppy wire was advanced   through stents as there was wire bias preventing postdilation.    With choice floppy wire, Stents were postdilated with 3mm NC   balloon.   There was 0% residual stenosis.  There was RADHA 3 flow   before and after PCI.          CONCLUSIONS:     Successful PCI of LAD of two drug eluting stents    Cardiac cath: 07/16/19  FINDINGS               LVGRAM     LVEDP 7   GRADIENT ACROSS AORTIC VALVE None   LV FUNCTION EF 50%   WALL MOTION Normal   MITRAL REGURGITATION Mild            CORONARY ARTERIES     LM Calcified. Distal 20-30% stenosis. LAD Severely calcified. Ostial 85-90% stenosis. Prox 75% stenosis. Prox-mid vessel is stented with Mid 50% stenosis. Distal vessel has 60-70% stenosis. D1 and D2 have ostial 90% stenoses. LCX Calcified. Prox-mid 40% stenosis. Mid-distal 50-60% stenosis extends into ostium of OM1 and OM2               RCA Calcified. Ostial 40-50% stenosis. Muka-vba-woyqnc 30% stenoses. PDA and PLV ostium have 40% stenoses. RPDA has distal 50-60% stenosis. CONCLUSIONS:      Severe LAD disease, refer for consideration of CABG vs high risk PCI of LM/LAD           Cardiac cath: 07/25/19  FINDINGS               Left heart cath     LVEDP 14            CORONARY ARTERIES        See diagnostic cath from 7/16/19 for findings. PERCUTANEOUS INTERVENTION DESCRIPTION      Bivalirudin was used for anticoagulation. A 7fr XB 3.5 guide was used to intubate the LM. A choice PT wire was used to cross the LAD lesion. The choice PT wire was then exchanged over a [employer]  otw balloon for a rota floppy wire. Then rotational atherectomy was performed with 1.5mm cristina. This was removed and lesions in ostium and prox/mid vessel were dilated with 2.5mm compliant balloon.   Then a 7fr guidezilla was advanced into LM and then stents were delivered from mid vessel to ostium, they were boston [employer] 2.45y56vw synergy BRET, 3x12mm synergy BRET, and 2.57g41co BRET. Stents were postdilated with 3mm NC balloon. There was 0% residual stenosis. There was RADHA 3 flow before and after PCI. Stent covered ostium and was noted to minimally project into LM. Distal stenosis was felt to be best treated medically. CONCLUSIONS:      Successful high risk PCI of LM/LAD with 3 drug eluting stents        Cardiac cath: 07/09/20  FINDINGS         LVGRAM     LVEDP  15   GRADIENT ACROSS AORTIC VALVE  <10mm HG   LV FUNCTION EF 60 %   WALL MOTION  normal   MITRAL REGURGITATION  mild         CORONARY ARTERIES     LM  Proximal-mid 10 to 20% stenosis with distal 90% stenosis that extends into both the LAD and circumflex, calcified vessel. LAD  There is a stent in the ostium of the LAD that extends to the proximal and mid segments, there is 99% ostial/proximal stenosis with mid 60% stenosis and distal 60% stenosis. D1, D2, D3 have diffuse disease with D1 having proximal 60% stenosis and D2 and D3 have ostial 90% stenoses, these are small diagonal branches. LCX Ostial/proximal 90% stenosis, calcified vessel, proximal 30 to 40% stenosis followed by mid-distal 60 to 70% stenosis that extends into the first obtuse marginal.  There is a bifurcation point distally in this vessel and the lower branch has ostial 70 to 80% stenosis. RCA  Dominant, calcified, ostial/proximal 40 to 50% stenosis. Mid-distal 30% stenosis. Vessel is calcified, PDA and PLV have mqcmbmrb-dmb-ljmfdi 20 to 30% stenoses. CONCLUSIONS:      Severe left main/LAD/circumflex disease  Refer for CABG  Continue heparin drip, hold Plavix in anticipation of surgery, will consider bridging with Integrilin/cangrelor pending removal of TR band.        Carotid: 12/21/16  IMPRESSION:     1.  The peak systolic velocity in the right internal carotid artery is 149 cm/s distally. This is indicative of a stenosis of between 50-69%. 2.  The peak systolic velocity in the left internal carotid artery is 101 cm/s along the mid course indicative of a stenosis of less than 50%. 3.  There is mixed fibrofatty plaque in the mid and distal portions of the right and left common carotid artery extending into the carotid bulbs. 4.  There is normal antegrade flow demonstrated in the right and left vertebral artery. CAB20  OPERATION PERFORMED:  1. Urgent coronary artery bypass grafting surgery x2 with a single  greater saphenous vein graft to the obtuse marginal branch of the  circumflex, pedicled left internal artery to the LAD. 2.  On-pump beating heart/no cross clamp technique. 3.  Left atrial appendage obliteration with 45-mm AtriCure left atrial  clip. 4.  Redmond-Abbi catheter placement. 5.  Cardiopulmonary bypass. 6.  Endoscopic vein harvest to the right greater saphenous vein. 7.  Transesophageal echo. 8.  Epiaortic ultrasound. 9.  Doppler verification of grafts. 10.  Bilateral five-level intercostal nerve block with Exparel. 11.  Platelet-gel application. Studies:       I have reviewed labs and imaging/xray/diagnostic testing in this note. Assessment      1. Coronary artery disease involving native coronary artery of native heart with unstable angina pectoris ([address]    2. S/P 2-vessel coronary artery bypass    3. Dyslipidemia    4. Essential hypertension            Plan   1. Cardiac rehab with [facility] [address] in [place]  2. Continue current medications  3. Follow up in 2 months with NP with virtual visit      Scribe's attestation: This note was scribed in the presence of Dr. Jaycee Eason by Loren Sapp Aurelio is a 80 y.o. male being evaluated by a Virtual Visit encounter to address concerns as mentioned above. A caregiver was present when appropriate.  Due to this being a TeleHealth encounter (During GNCLU-38 public health emergency), evaluation of the following organ systems was limited: Vitals/Constitutional/EENT/Resp/CV/GI//MS/Neuro/Skin/Heme-Lymph-Imm. Pursuant to the emergency declaration under the [facility], [facility] authority and the [employer] Act, this Virtual Visit was conducted with patient's (and/or legal guardian's) consent, to reduce the patient's risk of exposure to COVID-19 and provide necessary medical care. The patient (and/or legal guardian) has also been advised to contact this office for worsening conditions or problems, and seek emergency medical treatment and/or call 911 if deemed necessary. Services were provided through a video synchronous discussion virtually to substitute for in-person clinic visit. Patient and provider were located at their individual homes. --Viet Marks MD on 8/12/2020 at 9:35 AM    An electronic signature was used to authenticate this note. Thank you for allowing us to participate in the care of [address]. Please call me with any questions [phone]. Viet Marks MD, [facility]   Interventional Cardiologist  [place] [address]  [phone] [facility]  [phone] [address]  8/12/2020 9:35 AM    I will address the patient's cardiac risk factors and adjusted pharmacologic treatment as needed. In addition, I have reinforced the need for patient directed risk factor modification. Tobacco use was discussed with the patient and educated on the negative effects and was asked not to use. All questions and concerns were addressed to the patient/family. Alternatives to my treatment were discussed. I, Dr Viet Marks, personally performed the services described in this documentation, as scribed by the above signed scribe in my presence. It is both accurate and complete to my knowledge.   I agree with the details independently gathered by the clinical support staff and the scribed note accurately describes my personal service to the patient.

## 2020-08-12 ENCOUNTER — VIRTUAL VISIT (OUTPATIENT)
Dept: CARDIOLOGY CLINIC | Age: 85
End: 2020-08-12
Payer: MEDICARE

## 2020-08-12 PROCEDURE — G8427 DOCREV CUR MEDS BY ELIG CLIN: HCPCS | Performed by: INTERNAL MEDICINE

## 2020-08-12 PROCEDURE — G8417 CALC BMI ABV UP PARAM F/U: HCPCS | Performed by: INTERNAL MEDICINE

## 2020-08-12 PROCEDURE — 1111F DSCHRG MED/CURRENT MED MERGE: CPT | Performed by: INTERNAL MEDICINE

## 2020-08-12 PROCEDURE — 1036F TOBACCO NON-USER: CPT | Performed by: INTERNAL MEDICINE

## 2020-08-12 PROCEDURE — 99213 OFFICE O/P EST LOW 20 MIN: CPT | Performed by: INTERNAL MEDICINE

## 2020-08-12 PROCEDURE — 4040F PNEUMOC VAC/ADMIN/RCVD: CPT | Performed by: INTERNAL MEDICINE

## 2020-08-12 PROCEDURE — 1123F ACP DISCUSS/DSCN MKR DOCD: CPT | Performed by: INTERNAL MEDICINE

## 2020-08-12 NOTE — PATIENT INSTRUCTIONS
1. Cardiac rehab with [facility]  2. Continue current medications  3.  Follow up in 2 months with NP with virtual visit

## 2020-08-12 NOTE — LETTER
[facility] Cardiology - [address] [facility] [address] [facility]  Phone: [phone]  Fax: [phone]    Minerva Martins MD        August 12, 2020     Pj Ley MD  [facility]    Patient: Giacomo Alvarez  MR Number: <[id number]>  YOB: 1933  Date of Visit: 8/12/2020    Dear Dr. Pj Ley: Thank you for the request for consultation for Giacomo Alvarez. Below are the relevant portions of my assessment and plan of care. If you have questions, please do not hesitate to call me. I look forward to following Royce along with you. Sincerely,        Minerva Martins MD           [address]   CARDIAC EVALUATION NOTE  [phone]      PCP:  Pj Ley MD    Reason for Consultation/Chief Complaint:  Follow up with CAD    Subjective   History of Present Illness:  Giacomo Alvarez is a 80 y.o. patient with a history of CAD, S/P CABG, HTN and Hchol who presents with CP. He is a former pt of my previous practice. Pt has cv hx that dates back to 12/2016 admit to [facility] w/ unstable angina, abnl stress test. He was seen at [facility] and had cath w/ unsuccessful attempt at [address] [place] [address] (balloon would not cross). He was referred to [facility] and he had LAD PCI w/ 2 vin after orbital atherectomy was performed with diamondback catheter. Since then he feels better, no cp, but notes sob/noe, w/ minimal exertion. Htn, hchol, cad/ashd, stable on prescribed meds but ashd may have progressed, sob may be anginal equivalent, prev sx included ht burn as well for angina. On 07/11/19 he reports he was in [place] and started getting CP in 03/2019. He went to the hospital in [facility]. He had a stress test 03/15/19 which showed mild ischemia involving a very small region of apex. Mild fixed defect involving a small region of posterior basal wall. The results are not available at this time.  He was informed his testing came back unremarkable. He reports the CP occurs with rest and with activity. He reports the episodes are similar to his symptoms in 2016 but are worsening. He reports the pain occasionally wakes him up. He has occasional BLE edema but takes dyazide. His cardiac cath from 19 showed severe LAD disease. Referred for consideration of CABG vs high risk PCI of LM/LAD. His cardiac cath from 19 resulted in a temp TV pacer insertion and removal. Insertion of short term external heart assist system into heart, intraoperative, percutaneous approach. Assistance with cardiac output using impeller pump, continuous. Rotational atherectomy (rotablator) of LAD. High risk PCI of LM/LAD with 3 drug eluting stents. He was admitted -20 with CP for several weeks. His cardiac cath from 20 showed severe left main/LAD/circumflex disease. Refer for CABG. His echo from 20 showed his EF was 55% with mild concentric hypertrophy. He underwent CABGx2 on 20 with EDWINA obliteration with LA clip. Consent:  He and/or health care decision maker is aware that that he may receive a bill for this telephone service, depending on his insurance coverage, and has provided verbal consent to proceed: Yes      Documentation:  I communicated with the patient and/or health care decision maker about medications and symptoms. Details of this discussion including any medical advice provided: see below. I affirm this is a Patient Initiated Episode with an Established Patient who has not had a related appointment within my department in the past 7 days or scheduled within the next 24 hours.     Total Time: minutes: 5-10 minutes    Note: not billable if this call serves to triage the patient into an appointment for the relevant concern    2020    TELEHEALTH EVALUATION -- Audio/Visual (During SPNLP-39 public health emergency)      Nora Joe (:  1933) has requested an audio/video evaluation for the following concern(s): CAD S/P CABG. Today he reports he feels well overall. His home healthcare nurse states the CABG incision site is healing well. His leg is healing for vessel harvesting. He denies CP, SOB, BLE edema, dizziness or syncope. He uses a rollator walker. He has had a slight cough which is improving. Past Medical History:   has a past medical history of Arthritis, Bell's palsy, CAD (coronary artery disease), Gout, Hypertension, Kidney stone, Thyroid disease, and UTI (urinary tract infection). From care everywhere:    Past Medical History:   Diagnosis Date    Anxiety    History of kidney stones    Hypothyroid    Tremors of nervous system    Unspecified essential hypertension     PSH:     Past Surgical History:   Procedure Laterality Date    ARTHROPLASTY PATELLA    Left Cardiac Catheterization With Radial Approach Left 2016   LEFT CARDIAC CATHETERIZATION WITH RADIAL APPROACH performed by Keith Rashid MD at [facility] CATH LAB    OTHER   2 Stents    OTHER   Kidney stone surgery    PERCUTANEOUS CORONARY INTERVENTION N/A 2016   PERCUTANEOUS CORONARY INTERVENTION performed by Hazel De Santiago MD at [facility] CATH LAB              Surgical History:   has a past surgical history that includes joint replacement (Left); Coronary angioplasty with stent (2019); Coronary angioplasty with stent (2016); and Coronary artery bypass graft (N/A, 2020). Social History:   reports that he has never smoked. He has never used smokeless tobacco. He reports previous alcohol use. He reports that he does not use drugs.        SOCIAL HISTORY:     TOBACCO USE:  History   Smoking Status    Former Smoker    Packs/day: 0.50    Years: 20.00    Types: Cigarettes   Smokeless Tobacco    Never Used   Comment: Patient states he quit 27 years ago       FAMILY HISTORY:     Family History   Problem Relation Age of Onset    Cancer Mother      [facility] COPD Sister    Component Value Date     2020    K 3.7 2020    K 3.3 2020    CL 99 2020    CO2 29 2020    BUN 25 2020    CREATININE 0.7 2020    GFRAA >60 2020    AGRATIO 1.3 2020    LABGLOM >60 2020    GLUCOSE 119 2020    PROT 7.0 2020    PROT 8.1 10/11/2019    CALCIUM 8.8 2020    BILITOT 0.7 2020    ALKPHOS 58 2020    AST 38 2020    ALT 43 2020     PT/INR:  No results found for: PTINR  HgBA1c:  Lab Results   Component Value Date    LABA1C 5.6 2020     Lab Results   Component Value Date    TROPONINI 0.04 (H) 2020         Cardiac Data     Last EK19  SR HR 69    2019    nsr baseline artifact     Today stable ekg nsr no ischemic changes     Echo: 17    Findings:     ~Left Ventricle: Normal LV size and function, EF is 60-65 percent, normal wall thickness, normal wall motion, abnormal relaxation    ~Right Ventricle: Normal size and function    ~Left Atrium: Mildly enlarged    ~Right Atrium: Normal size    ~Mitral Valve: mildly thickened and calcified, trivial regurgitation    ~Aortic Valve: Sclerotic, normal mobility, trivial regurgitation    ~Tricuspid Valve: Appears normal, trivial regurgitation    ~Pulmonic Valve: Appears normal, trivial regurgitation    ~Great Vessels: Aortic root and IVC size are normal    ~Pericardium:No significant effusion      Conclusions:    Normal LV size and function, EF is 60-65 percent, abnormal relaxation      Echo 20   Left ventricular systolic function is normal with a visually estimated   ejection fraction of 55%. The left ventricle is normal in size with mild   concentric hypertrophy. No regional wall motion abnormalities are noted. Diastolic filling parameters suggests impaired left ventricular relaxation.     Stress Test: 17  Final Conclusions/Summary     Stress ECG Impressions:  No significant ST changes during vasodilator infusion Summary:  Normal myocardial perfusion imaging stress test EF 63 percent No regional wall motion abnormality     Stress test: 03/2019    Cath: 12/22/16  FINDINGS         CORONARY ARTERIES    Please see dr guerra's report regarding coronary anatomy        PERCUTANEOUS INTERVENTION DESCRIPTION     Bivalirudin was used for anticoagulation.  A 6fr VL3.5 guide was   used to intubate the LM.  A choice floppy wire was used to cross   the lesion.  A boston sci  catheter/balloon was used to   cross the lesion. Choice floppy wire was exchanged for csi wire   in anticipation of atherectomy.   catheter was removed.    CSI atherectomy of LAD was performed with 1.25mm crown.  Lesion   was then stented with boston sci synergy 2.41h81de BRET and boston   sci synergy 3x8mm BRET.   Stents were implanted to avoid jailing of   D2.  CSI wire was removed and a choice floppy wire was advanced   through stents as there was wire bias preventing postdilation.    With choice floppy wire, Stents were postdilated with 3mm NC   balloon.   There was 0% residual stenosis.  There was RADHA 3 flow   before and after PCI.          CONCLUSIONS:     Successful PCI of LAD of two drug eluting stents    Cardiac cath: 07/16/19  FINDINGS               LVGRAM     LVEDP 7   GRADIENT ACROSS AORTIC VALVE None   LV FUNCTION EF 50%   WALL MOTION Normal   MITRAL REGURGITATION Mild            CORONARY ARTERIES     LM Calcified. Distal 20-30% stenosis. LAD Severely calcified. Ostial 85-90% stenosis. Prox 75% stenosis. Prox-mid vessel is stented with Mid 50% stenosis. Distal vessel has 60-70% stenosis. D1 and D2 have ostial 90% stenoses. LCX Calcified. Prox-mid 40% stenosis. Mid-distal 50-60% stenosis extends into ostium of OM1 and OM2               RCA Calcified. Ostial 40-50% stenosis. Dvmx-rgi-rqmjog 30% stenoses. PDA and PLV ostium have 40% stenoses. RPDA has distal 50-60% stenosis.                 CONCLUSIONS: Severe LAD disease, refer for consideration of CABG vs high risk PCI of LM/LAD           Cardiac cath: 07/25/19  FINDINGS               Left heart cath     LVEDP 14            CORONARY ARTERIES        See diagnostic cath from 7/16/19 for findings. PERCUTANEOUS INTERVENTION DESCRIPTION      Bivalirudin was used for anticoagulation. A 7fr XB 3.5 guide was used to intubate the LM. A choice PT wire was used to cross the LAD lesion. The choice PT wire was then exchanged over a [employer]  otw balloon for a rota floppy wire. Then rotational atherectomy was performed with 1.5mm cristina. This was removed and lesions in ostium and prox/mid vessel were dilated with 2.5mm compliant balloon. Then a 7fr guidezilla was advanced into LM and then stents were delivered from mid vessel to ostium, they were boston sci 2.07n16lv synergy BRET, 3x12mm synergy BRET, and 2.78g63qg BRET. Stents were postdilated with 3mm NC balloon. There was 0% residual stenosis. There was RADHA 3 flow before and after PCI. Stent covered ostium and was noted to minimally project into LM. Distal stenosis was felt to be best treated medically. CONCLUSIONS:      Successful high risk PCI of LM/LAD with 3 drug eluting stents        Cardiac cath: 07/09/20  FINDINGS         LVGRAM     LVEDP  15   GRADIENT ACROSS AORTIC VALVE  <10mm HG   LV FUNCTION EF 60 %   WALL MOTION  normal   MITRAL REGURGITATION  mild         CORONARY ARTERIES     LM  Proximalmid 10 to 20% stenosis with distal 90% stenosis that extends into both the LAD and circumflex, calcified vessel. LAD  There is a stent in the ostium of the LAD that extends to the proximal and mid segments, there is 99% ostial/proximal stenosis with mid 60% stenosis and distal 60% stenosis. D1, D2, D3 have diffuse disease with D1 having proximal 60% stenosis and D2 and D3 have ostial 90% stenoses, these are small diagonal branches. LCX Ostial/proximal 90% stenosis, calcified vessel, proximal 30 to 40% stenosis followed by middistal 60 to 70% stenosis that extends into the first obtuse marginal.  There is a bifurcation point distally in this vessel and the lower branch has ostial 70 to 80% stenosis. RCA  Dominant, calcified, ostial/proximal 40 to 50% stenosis. Middistal 30% stenosis. Vessel is calcified, PDA and PLV have proximalmiddistal 20 to 30% stenoses. CONCLUSIONS:      Severe left main/LAD/circumflex disease  Refer for CABG  Continue heparin drip, hold Plavix in anticipation of surgery, will consider bridging with Integrilin/cangrelor pending removal of TR band. Carotid: 16  IMPRESSION:     1.  The peak systolic velocity in the right internal carotid artery is 149 cm/s distally. This is indicative of a stenosis of between 50-69%. 2.  The peak systolic velocity in the left internal carotid artery is 101 cm/s along the mid course indicative of a stenosis of less than 50%. 3.  There is mixed fibrofatty plaque in the mid and distal portions of the right and left common carotid artery extending into the carotid bulbs. 4.  There is normal antegrade flow demonstrated in the right and left vertebral artery. CAB20  OPERATION PERFORMED:  1. Urgent coronary artery bypass grafting surgery x2 with a single  greater saphenous vein graft to the obtuse marginal branch of the  circumflex, pedicled left internal artery to the LAD. 2.  On-pump beating heart/no cross clamp technique. 3.  Left atrial appendage obliteration with 45-mm AtriCure left atrial  clip. 4.  [place]-Abbi catheter placement. 5.  Cardiopulmonary bypass. 6.  Endoscopic vein harvest to the right greater saphenous vein. 7.  Transesophageal echo. 8.  Epiaortic ultrasound. 9.  Doppler verification of grafts. 10.  Bilateral five-level intercostal nerve block with Exparel. 11.  Platelet-gel application.     Studies: I have reviewed labs and imaging/xray/diagnostic testing in this note. Assessment      1. Coronary artery disease involving native coronary artery of native heart with unstable angina pectoris ([facility] [address]    2. S/P 2-vessel coronary artery bypass    3. Dyslipidemia    4. Essential hypertension            Plan   1. Cardiac rehab with [facility] [address] in [place]  2. Continue current medications  3. Follow up in 2 months with NP with virtual visit      Scribe's attestation: This note was scribed in the presence of Dr. Leni Rodríguez by Lee Stock RN          Tonia Pretty is a 80 y.o. male being evaluated by a Virtual Visit encounter to address concerns as mentioned above. A caregiver was present when appropriate. Due to this being a TeleHealth encounter (During TDMIS-44 public health emergency), evaluation of the following organ systems was limited: Vitals/Constitutional/EENT/Resp/CV/GI//MS/Neuro/Skin/Heme-Lymph-Imm. Pursuant to the emergency declaration under the [address] [place] [address] and the [employer] Act, this Virtual Visit was conducted with patient's (and/or legal guardian's) consent, to reduce the patient's risk of exposure to COVID-19 and provide necessary medical care. The patient (and/or legal guardian) has also been advised to contact this office for worsening conditions or problems, and seek emergency medical treatment and/or call 911 if deemed necessary. Services were provided through a video synchronous discussion virtually to substitute for in-person clinic visit. Patient and provider were located at their individual homes. --Leni Rodríguez MD on 8/12/2020 at 9:35 AM    An electronic signature was used to authenticate this note. Thank you for allowing us to participate in the care of Tonia Pretty. Please call me with any questions [phone].     Leni Rodríguez MD, [facility] Interventional Cardiologist  [address]  [phone] [address] [facility]  [phone] [address] [place] [address]  8/12/2020 9:35 AM    I will address the patient's cardiac risk factors and adjusted pharmacologic treatment as needed. In addition, I have reinforced the need for patient directed risk factor modification. Tobacco use was discussed with the patient and educated on the negative effects and was asked not to use. All questions and concerns were addressed to the patient/family. Alternatives to my treatment were discussed. I, Dr Shana Quinteros, personally performed the services described in this documentation, as scribed by the above signed scribe in my presence. It is both accurate and complete to my knowledge. I agree with the details independently gathered by the clinical support staff and the scribed note accurately describes my personal service to the patient.

## 2020-08-19 PROBLEM — J44.9 CHRONIC OBSTRUCTIVE LUNG DISEASE (HCC): Status: ACTIVE | Noted: 2020-08-19

## 2020-08-19 PROBLEM — R53.81 PHYSICAL DEBILITY: Status: ACTIVE | Noted: 2020-07-28

## 2020-08-19 PROBLEM — G89.29 CHRONIC RIGHT-SIDED LOW BACK PAIN WITHOUT SCIATICA: Status: ACTIVE | Noted: 2018-06-20

## 2020-08-19 PROBLEM — R74.8 ABNORMAL LIVER ENZYMES: Status: ACTIVE | Noted: 2018-08-01

## 2020-08-19 PROBLEM — M54.50 CHRONIC RIGHT-SIDED LOW BACK PAIN WITHOUT SCIATICA: Status: ACTIVE | Noted: 2018-06-20

## 2020-08-19 PROBLEM — R07.9 CHEST PAIN: Status: RESOLVED | Noted: 2020-07-09 | Resolved: 2020-08-19

## 2020-08-19 PROBLEM — M17.11 PRIMARY OSTEOARTHRITIS OF RIGHT KNEE: Status: ACTIVE | Noted: 2020-07-20

## 2020-08-19 RX ORDER — SPIRONOLACTONE 25 MG/1
TABLET ORAL
Qty: 90 TABLET | Refills: 1 | Status: SHIPPED | OUTPATIENT
Start: 2020-08-19

## 2020-08-19 NOTE — TELEPHONE ENCOUNTER
Received request for RF for spironolactone 25 mg taken daily. Last seen in office by you on 8/12/2020. F/U scheduled for 10/15/2020.  Pended for your approval.

## 2020-08-25 ENCOUNTER — OFFICE VISIT (OUTPATIENT)
Dept: CARDIOTHORACIC SURGERY | Age: 85
End: 2020-08-25

## 2020-08-25 VITALS
WEIGHT: 197.4 LBS | SYSTOLIC BLOOD PRESSURE: 136 MMHG | OXYGEN SATURATION: 95 % | TEMPERATURE: 97.5 F | DIASTOLIC BLOOD PRESSURE: 90 MMHG | BODY MASS INDEX: 29.24 KG/M2 | HEIGHT: 69 IN | HEART RATE: 74 BPM

## 2020-08-25 PROCEDURE — 99024 POSTOP FOLLOW-UP VISIT: CPT | Performed by: THORACIC SURGERY (CARDIOTHORACIC VASCULAR SURGERY)

## 2020-08-25 RX ORDER — TRAMADOL HYDROCHLORIDE 50 MG/1
50 TABLET ORAL EVERY 8 HOURS PRN
Qty: 21 TABLET | Refills: 0 | OUTPATIENT
Start: 2020-08-25 | End: 2020-09-01

## 2020-08-25 RX ORDER — TRAMADOL HYDROCHLORIDE 50 MG/1
50 TABLET ORAL EVERY 6 HOURS PRN
COMMUNITY
End: 2020-08-25 | Stop reason: SDUPTHER

## 2020-08-25 NOTE — PROGRESS NOTES
Progress Note    CC:  Postoperative follow-up    S/P    CABG surgery. Subjective:    He feels good. He still has some chest discomfort particularly in the morning when he gets up. He is still taking 1 tramadol a day. His activity level is appropriate for this point in his recovery. I encouraged him to start cardiac rehab. Vital Signs:                                                 BP (!) 136/90 (Site: Left Upper Arm)   Pulse 74   Temp 97.5 °F (36.4 °C) (Infrared)   Ht 5' 9\" (1.753 m)   Wt 197 lb 6.4 oz (89.5 kg)   SpO2 95%   BMI 29.15 kg/m²        CV:   Regular rate and rhythm with no rubs or murmurs. Pulm: Clear lung fields with no rales or rhonchi. Incisions:    Sternal incision is clean, dry and intact. Sternum is stable. Abd:  Soft  Ext: Leg incision is clean, dry and intact. No peripheral edema. Assessment/Plan:  Overall doing very well post CABG surgery. I will refill his tramadol 20 tablets one more time. He understands this will be the last refill. We discussed secondary risk prevention for cardiovascular disease. I gave the patient a copy of our protocol for the secondary risk prevention of cardiovascular disease. The patient may increase activities as he feels comfortable doing so. Follow-up with his PCP and Dr. Tracey Vasquez as prescribed. Follow-up with me as needed.     Madan Quiñonez MD  8/25/2020  10:06 AM

## 2020-08-28 ENCOUNTER — TELEPHONE (OUTPATIENT)
Dept: CARDIOLOGY CLINIC | Age: 85
End: 2020-08-28

## 2020-08-28 NOTE — TELEPHONE ENCOUNTER
Pt was released by the heart surgeon and was told to have cardiac rehab. Pt needs order sent to [facility] in [place]. Please notify pt went sent so he can set up.  Ty

## 2020-08-28 NOTE — TELEPHONE ENCOUNTER
Attempted to call Hugh Berry at [phone] but there was no answer and no VM set up. Will try again.

## 2020-09-01 ENCOUNTER — TELEPHONE (OUTPATIENT)
Dept: CARDIOTHORACIC SURGERY | Age: 85
End: 2020-09-01

## 2020-09-01 NOTE — TELEPHONE ENCOUNTER
Pt called to report that he had a CXR at [facility] last evening due to c/o increased shortness of breath. Films pushed through to PACS and viewed by Dr. Chris Licea. Pt to be sched for an outpatient Left Thoracentesis ASAP. Last dose of ASA today.  Order placed in [address] and notified Johnie Brown in scheduling.  Thomas Prasad

## 2020-09-02 ENCOUNTER — TELEPHONE (OUTPATIENT)
Dept: CARDIOTHORACIC SURGERY | Age: 85
End: 2020-09-02

## 2020-09-03 ENCOUNTER — HOSPITAL ENCOUNTER (OUTPATIENT)
Dept: GENERAL RADIOLOGY | Age: 85
Discharge: HOME | End: 2020-09-03
Payer: MEDICARE

## 2020-09-03 ENCOUNTER — HOSPITAL ENCOUNTER (OUTPATIENT)
Dept: ULTRASOUND IMAGING | Age: 85
Discharge: HOME | End: 2020-09-03
Payer: MEDICARE

## 2020-09-03 ENCOUNTER — HOSPITAL ENCOUNTER (OUTPATIENT)
Age: 85
Discharge: HOME | End: 2020-09-03
Payer: MEDICARE

## 2020-09-03 LAB
INR BLD: 1.49 (ref 0.86–1.14)
PROTHROMBIN TIME: 17.4 SEC (ref 10–13.2)

## 2020-09-03 PROCEDURE — 71045 X-RAY EXAM CHEST 1 VIEW: CPT

## 2020-09-03 PROCEDURE — 85610 PROTHROMBIN TIME: CPT

## 2020-09-03 PROCEDURE — 36415 COLL VENOUS BLD VENIPUNCTURE: CPT

## 2020-09-03 PROCEDURE — 32555 ASPIRATE PLEURA W/ IMAGING: CPT

## 2020-09-10 ENCOUNTER — TELEPHONE (OUTPATIENT)
Dept: CARDIOLOGY CLINIC | Age: 85
End: 2020-09-10

## 2020-09-10 NOTE — TELEPHONE ENCOUNTER
N states that pt was put on 5 mg Eliquis [facility] Pt is now home and does not have any Eliquis. If [facility] wants him to continue this, he will need a 90 day Rx sent to [facility] in [place]. Ph: [phone].  Please call to advise

## 2020-09-11 ENCOUNTER — TELEPHONE (OUTPATIENT)
Dept: CARDIOLOGY CLINIC | Age: 85
End: 2020-09-11

## 2020-09-11 NOTE — TELEPHONE ENCOUNTER
Need to know why he was started on Eliquis and by which physician as this office did not start him on eliquis per chart.

## 2020-09-11 NOTE — TELEPHONE ENCOUNTER
Pt calling again today wanting his Eliquis called into his pharmacy. Pt pharmacy told pt they called 2 times already and told him to called again. Pt ran out 9/10/2020. Please advise.

## 2020-09-14 NOTE — TELEPHONE ENCOUNTER
Refill request for Eliquis. Pt needs 90 day supply.   Didn't see medication on pt list.    :  [facility] [address] [phone] - F [phone]

## 2020-10-15 ENCOUNTER — VIRTUAL VISIT (OUTPATIENT)
Dept: CARDIOLOGY CLINIC | Age: 85
End: 2020-10-15
Payer: MEDICARE

## 2020-10-15 PROCEDURE — 99442 PR PHYS/QHP TELEPHONE EVALUATION 11-20 MIN: CPT | Performed by: NURSE PRACTITIONER

## 2020-10-15 NOTE — PROGRESS NOTES
[address]   Cardiology Note  Telephone Visit    Chandu Rooney is a 80 y.o. male evaluated via telephone on 10/15/2020. Consent:  He and/or health care decision maker is aware that that he may receive a bill for this telephone service, depending on his insurance coverage, and has provided verbal consent to proceed: Yes  Documentation:  I affirm this is a Patient Initiated Episode with a Patient who has not had a related appointment within my department in the past 7 days or scheduled within the next 24 hours. Patient identification was verified at the start of the visit: Yes  Total Time: minutes: 11-20 minutes  Note: not billable if this call serves to triage the patient into an appointment for the relevant concern        Date:  October 15, 2020  Patientname: Chandu Rooney  YOB: 1933    Primary Care physician: Blanca Mccabe MD    HISTORY OF PRESENT ILLNESS: Chandu Rooney is a 80 y.o. male with a history of CAD, PAF, HTN HLD. In 12/2016 he had abnormal stress test and had atherectomy and BRET x2 LAD. In 7/2019 he had rotablator and BRET x3 LAD. LHC 7/9/2020 showed severe LM disease, referred to CT surgery. Echo showed EF 55%. On 7/13/2020 he had CABG x2 and EDWINA clip. Developed atrial fibrillation post op. He had thoracentesis 9/3/2020 and 10/14/2020 for recurrent left pleural effusion. Today he presents for telephone visit for CAD, PAF, HTN, HLD. He feels better since thoracentesis yesterday. Shortness of breath has improved. He has no chest pain, dizziness, palpitations, syncope, edema. He has lost weight. He has not started cardiac rehab yet due to knee pain. Weight today 10/15/2020: 198 lbs  Discharge weight 7/19/2020: 201 lbs    Past Medical History:   has a past medical history of Arthritis, Bell's palsy, CAD (coronary artery disease), Gout, Hypertension, Kidney stone, Thyroid disease, and UTI (urinary tract infection).     Past Surgical History:   has a past surgical history that includes Coronary angioplasty with stent (07/25/2019); Coronary angioplasty with stent (12/22/2016); Coronary artery bypass graft (N/A, 7/13/2020); transesophageal echocardiogram (07/13/2020); Cardiac catheterization (07/09/2020); Total knee arthroplasty (Left, 2011); and thoracentesis (Left, 09/03/2020). Home Medications:    Prior to Admission medications    Medication Sig Start Date End Date Taking? Authorizing Provider   apixaban (ELIQUIS) 5 MG TABS tablet Take 1 tablet by mouth 2 times daily 9/14/20 12/13/20  Salud Hannon MD   spironolactone (ALDACTONE) 25 MG tablet TAKE 1 TABLET BY MOUTH EVERY DAY 8/19/20   Salud Hannon MD   amiodarone (CORDARONE) 200 MG tablet Take 1 tablet by mouth 2 times daily 200mg 2 times daily 7/19-7/22  Then 200mg once daily for 2 weeks  Then stop 7/23/20   Nick Yang MD   metoprolol tartrate (LOPRESSOR) 50 MG tablet Take 1 tablet by mouth 2 times daily 7/19/20   Nick Yang MD   bisacodyl (DULCOLAX) 5 MG EC tablet Take 1 tablet by mouth daily 7/20/20   Nick Yang MD   atorvastatin (LIPITOR) 40 MG tablet Take 1 tablet by mouth daily 10/9/19   Salud Hannon MD   Respiratory Therapy Supplies (SPIROMETER) KIT 1 Units by Does not apply route daily 8/12/19   Salud Hannon MD   levothyroxine (SYNTHROID) 50 MCG tablet Take 50 mcg by mouth Daily    Historical Provider, MD   allopurinol (ZYLOPRIM) 300 MG tablet Take 300 mg by mouth daily    Historical Provider, MD   aspirin 81 MG tablet Take 81 mg by mouth daily    Historical Provider, MD     Allergies:  Patient has no known allergies. Social History:   reports that he has never smoked. He has never used smokeless tobacco. He reports previous alcohol use. He reports that he does not use drugs. Family History: family history is not on file. Review of Systems   Review of Systems   Constitutional: Negative. Respiratory: Positive for shortness of breath. Cardiovascular: Negative. Neurological: Negative. OBJECTIVE:    Vital signs:    Patient-Reported Vitals 10/15/2020   Patient-Reported Weight 198lb   Patient-Reported Height 5'9\"   Patient-Reported Systolic (No Data)   Patient-Reported Diastolic -   Patient-Reported Pulse Pt no able to obtain   Patient-Reported Temperature Pt not able to obtain   Patient-Reported SpO2 Pt not able to obtain   Patient-Reported Peak Flow Pt not able to obtain      Physical Exam:  LE, telephone visit, speech clear, able to complete sentences, no distress    Data Reviewed:      CABG 7/13/2020:  1.  Urgent coronary artery bypass grafting surgery x2 with a single  greater saphenous vein graft to the obtuse marginal branch of the  circumflex, pedicled left internal artery to the LAD. 2.  On-pump beating heart/no cross clamp technique. 3.  Left atrial appendage obliteration with 45-mm AtriCure left atrial  clip. 4.  [place] catheter placement. 5.  Cardiopulmonary bypass. 6.  Endoscopic vein harvest to the right greater saphenous vein. 7.  Transesophageal echo. 8.  Epiaortic ultrasound. 9.  Doppler verification of grafts. 10.  Bilateral five-level intercostal nerve block with Exparel.   11.  Platelet-gel application.     Echo 2/33/0658:   Left ventricular systolic function is normal with a visually estimated   ejection fraction of 55%.   The left ventricle is normal in size with mild concentric hypertrophy.   No regional wall motion abnormalities are noted.   Diastolic filling parameters suggests impaired left ventricular relaxation.     Coronary angiogram 7/9/2020:  Unstable angina  PROCEDURES PERFORMED    Left heart catheterization  LVgram  Coronary angiogam  Coronary cath  PROCEDURE DESCRIPTION   Risks/benefits/alternatives/outcomes were discussed with patient and/or family and informed consent was obtained.  Using the Barbeau scale, the patient's right radial artery was found to be a level B.  Patient was prepped draped in the usual sterile fashion.  Local anaesthetic was applied over puncture site.  Using a back wall technique, a 6 Japanese Terumo sheath was inserted into right radial artery.  Verapamil, nitroglycerin, nicardipine were administered through the sheath.  Heparin was administered.  Diagnostic 5 Upper sorbian Medtronic pigtail and ultra catheters were used for diagnostic angiograms.  At the conclusion of the procedure, a TR band was placed over the puncture site and hemostasis was obtained. Va Cheeks were no immediate complications. I supervised sedation with versed 2 mg/fentanyl 50 Mcg during the procedure.  100 cc contrast was utilized. <20cc EBL. FINDINGS      LVEDP  15   GRADIENT ACROSS AORTIC VALVE  <10mm HG   LV FUNCTION EF 60 %   WALL MOTION  normal   MITRAL REGURGITATION  mild      LM  Proximal-mid 10 to 20% stenosis with distal 90% stenosis that extends into both the LAD and circumflex, calcified vessel.         LAD  There is a stent in the ostium of the LAD that extends to the proximal and mid segments, there is 99% ostial/proximal stenosis with mid 60% stenosis and distal 60% stenosis.     D1, D2, D3 have diffuse disease with D1 having proximal 60% stenosis and D2 and D3 have ostial 90% stenoses, these are small diagonal branches.         LCX Ostial/proximal 90% stenosis, calcified vessel, proximal 30 to 40% stenosis followed by mid-distal 60 to 70% stenosis that extends into the first obtuse marginal.  There is a bifurcation point distally in this vessel and the lower branch has ostial 70 to 80% stenosis.          RCA  Dominant, calcified, ostial/proximal 40 to 50% stenosis.  Mid-distal 30% stenosis.  Vessel is calcified, PDA and PLV have [id number] 20 to 30% stenoses.      CONCLUSIONS:    Severe left main/LAD/circumflex disease  Refer for CABG  Continue heparin drip, hold Plavix in anticipation of surgery, will consider bridging with Integrilin/cangrelor pending removal of TR band.     Echo 7/17/2019:  Normal left ventricular systolic function with ejection fraction of 55-60%.   No regional wall motion abnormalites are seen.   Grade I diastolic dysfunction with normal filling pressure. Mild aortic   stenosis.   Mild aortic regurgitation.   Systolic pulmonic artery pressure (SPAP) is normal estimated at 23 mmHg   (Right atrial pressure of 3 mmHg). Cardiology Labs Reviewed:   CBC: No results for input(s): WBC, HGB, HCT, PLT in the last 72 hours. BMP:No results for input(s): NA, K, CO2, BUN, CREATININE, LABGLOM, GLUCOSE in the last 72 hours. PT/INR: No results for input(s): PROTIME, INR in the last 72 hours. APTT:No results for input(s): APTT in the last 72 hours. FASTING LIPID PANEL:  Lab Results   Component Value Date    HDL 25 07/13/2020    LDLCALC 32 07/13/2020    TRIG 104 07/13/2020     LIVER PROFILE:No results for input(s): AST, ALT, ALB in the last 72 hours. BNP: No results found for: PROBNP    Reviewed all labs and imaging today    Assessment:   CAD: no angina; s/p CABG x2 and EDWINA clip 7/13/2020; s/p orbital atherectomy and BRET x2 in 2016 and repeat in 2019 with BRET x3  Paroxysmal atrial fibrillation: stable              - [id number] score >2 on eliquis   - noted post CABG   - amiodarone discontinued after 1 month  HTN: controlled  HLD: controlled, LDL 46, continue statin  Left pleural effusion: s/p thoracentesis 10/14/2020 and 9/3/2020     Plan:   1. Encouraged cardiac rehab  2. Continue aspirin, eliquis, statin, lopressor, spironolactone   3. Stay active along with a healthy diet  4. Check BP at home and call the office if consistently out of goal range  5.  Follow up in 6 months    Haylie Ortez, APRN-CNP  [facility], [place]  [phone]

## 2020-10-16 ASSESSMENT — ENCOUNTER SYMPTOMS: SHORTNESS OF BREATH: 1

## 2020-11-13 RX ORDER — METOPROLOL TARTRATE 50 MG/1
TABLET, FILM COATED ORAL
Qty: 180 TABLET | Refills: 1 | Status: SHIPPED | OUTPATIENT
Start: 2020-11-13

## 2020-11-13 NOTE — TELEPHONE ENCOUNTER
Received RF request for metoprolol tartrate 50 mg taken BID S/P urgent CABG x2 w/EDWINA clip placed on 7/13/2020 by Dr. Anderson Downey. Anna Garvin. Last seen in office by you on 10/14/2020. Pended for your approval since no longer under surgeon's care.

## 2022-06-14 ENCOUNTER — TELEPHONE (OUTPATIENT)
Dept: CARDIOLOGY CLINIC | Age: 87
End: 2022-06-14

## (undated) DEVICE — CANNULA PERF 15FR L12.5IN RG STPCOCK WIREWOUND BODY

## (undated) DEVICE — SUTURE NONABSORBABLE MONOFILAMENT 7-0 BV-1 1X24 IN PROLENE 8702H

## (undated) DEVICE — LEAD PACEMKR MYOCARDIAL UNIPOLAR TEMP

## (undated) DEVICE — PUNCH AORT BLDE DIA4.4MM LNG HNDL SHRP 2 CUT EDGE FOR COR

## (undated) DEVICE — BLADE STRNM SAW STR 10/BX

## (undated) DEVICE — PATCH SURG FIBRIN SEAL 4.8X4.8 CM ABSORBABLE TACHOSIL

## (undated) DEVICE — STABILIZER SURG FOR BEAT HRT SURG URCHIN EVO

## (undated) DEVICE — STERILE POLYISOPRENE POWDER-FREE SURGICAL GLOVES: Brand: PROTEXIS

## (undated) DEVICE — SYSTEM BLD DEL

## (undated) DEVICE — APPLICATOR LNG TP 12.5IN

## (undated) DEVICE — KIT APPL 11:1 PROC W/ FIBRIJET MED CUP APPL TIP TY

## (undated) DEVICE — SUTURE PROL SZ 6-0 L24IN NONABSORBABLE BLU L13MM C-1 3/8 8726H

## (undated) DEVICE — DRAIN SURG FLAT W7MMXL20CM FULL PERF

## (undated) DEVICE — KIT,ANTI FOG,W/SPONGE & FLUID,SOFT PACK: Brand: MEDLINE

## (undated) DEVICE — SUTURE PROL SZ 4-0 L36IN NONABSORBABLE BLU BB L17MM 3/8 CIR 8581H

## (undated) DEVICE — THORACIC CATHETER, STRAIGHT, SILICONE, WITH CLOTSTOP®: Brand: AXIOM® ATRAUM™ WITH CLOTSTOP®

## (undated) DEVICE — RETRACTOR VEIN LOOP

## (undated) DEVICE — RESERVOIR,SUCTION,100CC,SILICONE: Brand: MEDLINE

## (undated) DEVICE — Device

## (undated) DEVICE — SUTURE VCRL SZ 4-0 L18IN ABSRB UD L19MM PS-2 3/8 CIR PRIM J496H

## (undated) DEVICE — WAX SURG 2.5GM HEMSTAT BNE BEESWAX PARAFFIN ISO PALMITATE

## (undated) DEVICE — CANNULA ART 21FR L145IN VENT CONN SFT FLOW EXT

## (undated) DEVICE — CANNULA PERF 7FR L5.5IN AORT ROOT RADPQ STD TIP W/ VENT LN

## (undated) DEVICE — LIGHT HANDLE: Brand: DEVON

## (undated) DEVICE — ADHESIVE SKIN CLSR 0.7ML TOP DERMBND ADV

## (undated) DEVICE — SHUNT CV L14MM DIA1.5MM IC SIL TAPR TIP RADPQ CLRVW

## (undated) DEVICE — COVER TRNSDUC W6XL96IN POLY TELESCOPICALLY FLD W/ ATTCH FRM

## (undated) DEVICE — SUTURE ETHBND EXCEL SZ 2-0 L36IN NONABSORBABLE GRN L26MM SH X523H

## (undated) DEVICE — APPLIER CLP L9.375IN APER 2.1MM CLS L3.8MM 20 SM TI CLP

## (undated) DEVICE — TIP APPL TOP 2 SPRY

## (undated) DEVICE — FOGARTY - HYDRAGRIP SURGICAL - CLAMP INSERTS: Brand: FOGARTY SOFTJAW

## (undated) DEVICE — SUTURE PROL SZ 3-0 L36IN NONABSORBABLE BLU L26MM SH 1/2 CIR 8522H

## (undated) DEVICE — SHUNT CV L14MM DIA1.25MM IC BLB CLRVW

## (undated) DEVICE — SUTURE ETHBND EXCEL SZ 0 L18IN NONABSORBABLE GRN L36MM CT-1 CX21D

## (undated) DEVICE — SYSTEM VES HARV ENDOSCP VASOVIEW HEMOPRO

## (undated) DEVICE — LEAD PACE 2.6FR L50CM UPLR TEMP ATR NONSTEROID ELUT PIN

## (undated) DEVICE — JACKSON-PRATT 100CC BULB RESERVOIR: Brand: CARDINAL HEALTH

## (undated) DEVICE — SOLUTION IV IRRIG POUR BRL 0.9% SODIUM CHL 2F7124

## (undated) DEVICE — [HIGH FLOW INSUFFLATOR,  DO NOT USE IF PACKAGE IS DAMAGED,  KEEP DRY,  KEEP AWAY FROM SUNLIGHT,  PROTECT FROM HEAT AND RADIOACTIVE SOURCES.]: Brand: PNEUMOSURE

## (undated) DEVICE — STABILIZER SURG TISS W/ CANSTR TBNG EVOLUTION OCTPS

## (undated) DEVICE — SUTURE SZ 7 L18IN NONABSORBABLE SIL CCS L48MM 1/2 CIR STRNM M655G

## (undated) DEVICE — PACK PROCEDURE SURG OPN HRT A BASIC

## (undated) DEVICE — KIT BLWR MISTER 5P 15L W/ TBNG SET IRRIG MIST TO IMPROVE

## (undated) DEVICE — SUTURE ABSORBABLE BRAIDED 2-0 CT-1 27 IN UD VICRYL J259H

## (undated) DEVICE — SUTURE VCRL SZ 0 L27IN ABSRB UD L36MM CT-1 1/2 CIR J260H

## (undated) DEVICE — SUTURE NONABSORBABLE MONOFILAMENT 5-0 C-1 1X24 IN PROLENE 8725H

## (undated) DEVICE — BLADE RETRCT 3 SH FNGR ASST

## (undated) DEVICE — SUTURE VCRL SZ 3-0 L27IN ABSRB UD L26MM SH 1/2 CIR J416H

## (undated) DEVICE — SYSTEM SEAL 4.3MM PROX HEMSTAT HEARTSTRING III

## (undated) DEVICE — SUTURE VCRL SZ 3-0 L27IN ABSRB UD L36MM CT-1 1/2 CIR J258H

## (undated) DEVICE — GOWN SIRUS NONREIN XL W/TWL: Brand: MEDLINE INDUSTRIES, INC.

## (undated) DEVICE — TIP APPL 20 GAX5 CM 2 CANN MALL

## (undated) DEVICE — THORACIC CATHETER, RIGHT ANGLE, SILICONE, WITH CLOTSTOP®: Brand: AXIOM® ATRAUM™ WITH CLOTSTOP®